# Patient Record
Sex: MALE | Race: WHITE | Employment: OTHER | URBAN - METROPOLITAN AREA
[De-identification: names, ages, dates, MRNs, and addresses within clinical notes are randomized per-mention and may not be internally consistent; named-entity substitution may affect disease eponyms.]

---

## 2017-06-10 PROBLEM — J69.0 ASPIRATION PNEUMONIA OF BOTH LUNGS DUE TO GASTRIC SECRETIONS (HCC): Status: ACTIVE | Noted: 2017-06-10

## 2018-05-05 ENCOUNTER — HOSPITAL ENCOUNTER (EMERGENCY)
Age: 53
Discharge: HOME | End: 2018-05-05
Attending: EMERGENCY MEDICINE
Payer: COMMERCIAL

## 2018-05-05 VITALS
HEART RATE: 84 BPM | SYSTOLIC BLOOD PRESSURE: 125 MMHG | TEMPERATURE: 97.5 F | DIASTOLIC BLOOD PRESSURE: 66 MMHG | RESPIRATION RATE: 27 BRPM | WEIGHT: 163.5 LBS | OXYGEN SATURATION: 94 % | BODY MASS INDEX: 24.14 KG/M2

## 2018-05-05 DIAGNOSIS — T40.1X1A ACCIDENTAL OVERDOSE OF HEROIN, INITIAL ENCOUNTER (HCC): Primary | ICD-10-CM

## 2018-05-05 PROCEDURE — 99284 EMERGENCY DEPT VISIT MOD MDM: CPT

## 2018-05-05 PROCEDURE — 96374 THER/PROPH/DIAG INJ IV PUSH: CPT

## 2018-05-05 PROCEDURE — 2580000003 HC RX 258: Performed by: EMERGENCY MEDICINE

## 2018-05-05 PROCEDURE — 6360000002 HC RX W HCPCS: Performed by: STUDENT IN AN ORGANIZED HEALTH CARE EDUCATION/TRAINING PROGRAM

## 2018-05-05 PROCEDURE — 82962 GLUCOSE BLOOD TEST: CPT

## 2018-05-05 PROCEDURE — 96375 TX/PRO/DX INJ NEW DRUG ADDON: CPT

## 2018-05-05 RX ORDER — ONDANSETRON 2 MG/ML
4 INJECTION INTRAMUSCULAR; INTRAVENOUS EVERY 6 HOURS PRN
Status: DISCONTINUED | OUTPATIENT
Start: 2018-05-05 | End: 2018-05-05 | Stop reason: HOSPADM

## 2018-05-05 RX ORDER — NALOXONE HYDROCHLORIDE 0.4 MG/ML
0.1 INJECTION, SOLUTION INTRAMUSCULAR; INTRAVENOUS; SUBCUTANEOUS ONCE
Status: DISCONTINUED | OUTPATIENT
Start: 2018-05-05 | End: 2018-05-05

## 2018-05-05 RX ORDER — NALOXONE HYDROCHLORIDE 0.4 MG/ML
0.2 INJECTION, SOLUTION INTRAMUSCULAR; INTRAVENOUS; SUBCUTANEOUS ONCE
Status: COMPLETED | OUTPATIENT
Start: 2018-05-05 | End: 2018-05-05

## 2018-05-05 RX ORDER — SODIUM CHLORIDE 9 MG/ML
1000 INJECTION, SOLUTION INTRAVENOUS ONCE
Status: COMPLETED | OUTPATIENT
Start: 2018-05-05 | End: 2018-05-05

## 2018-05-05 RX ORDER — NALOXONE HYDROCHLORIDE 1 MG/ML
1.8 INJECTION INTRAMUSCULAR; INTRAVENOUS; SUBCUTANEOUS ONCE
Status: COMPLETED | OUTPATIENT
Start: 2018-05-05 | End: 2018-05-05

## 2018-05-05 RX ADMIN — ONDANSETRON 4 MG: 2 INJECTION INTRAMUSCULAR; INTRAVENOUS at 16:22

## 2018-05-05 RX ADMIN — NALOXONE HYDROCHLORIDE 0.2 MG: 0.4 INJECTION, SOLUTION INTRAMUSCULAR; INTRAVENOUS; SUBCUTANEOUS at 16:22

## 2018-05-05 RX ADMIN — SODIUM CHLORIDE 1000 ML: 9 INJECTION, SOLUTION INTRAVENOUS at 16:40

## 2018-05-05 RX ADMIN — NALOXONE HYDROCHLORIDE 1.8 MG: 1 INJECTION PARENTERAL at 16:32

## 2018-05-05 ASSESSMENT — ENCOUNTER SYMPTOMS
COUGH: 0
SORE THROAT: 0
ABDOMINAL PAIN: 0
NAUSEA: 0
BACK PAIN: 0
COLOR CHANGE: 0
SHORTNESS OF BREATH: 0
VOMITING: 0

## 2018-05-07 LAB — METER GLUCOSE: 467 MG/DL (ref 70–110)

## 2018-07-30 ENCOUNTER — HOSPITAL ENCOUNTER (EMERGENCY)
Age: 53
Discharge: HOME | End: 2018-07-30
Attending: EMERGENCY MEDICINE
Payer: COMMERCIAL

## 2018-07-30 VITALS
HEART RATE: 63 BPM | TEMPERATURE: 97.8 F | SYSTOLIC BLOOD PRESSURE: 132 MMHG | OXYGEN SATURATION: 95 % | RESPIRATION RATE: 22 BRPM | DIASTOLIC BLOOD PRESSURE: 71 MMHG

## 2018-07-30 DIAGNOSIS — T40.1X1A ACCIDENTAL OVERDOSE OF HEROIN, INITIAL ENCOUNTER (HCC): Primary | ICD-10-CM

## 2018-07-30 PROCEDURE — 99283 EMERGENCY DEPT VISIT LOW MDM: CPT

## 2018-07-30 NOTE — ED PROVIDER NOTES
ED ATTENDING NOTE    I saw and examined the patient. I discussed the history and examination with the resident and agree with the plan of care         HPI: Pt presents s/p overdose, pt admit to using \"dope\" tonight, unsure of who called EMS, awoke after getting narcan, pt now alert and oriented, no complaints, pt denies f/c, ha, cp, sob, cough, ap, n/v/d. Pt is lying in bed in no acute distress. --------------------------------------------- PAST HISTORY ---------------------------------------------  Past Medical History:  has a past medical history of Arthritis; Aspiration pneumonia of both lungs due to gastric secretions ([facility] Bipolar 1 disorder ([facility] Hepatitis C; and Schizophrenia ([facility] Past Surgical History:  has a past surgical history that includes joint replacement; Endoscopy, colon, diagnostic; Colonoscopy; Rotator cuff repair; Orbital fracture repair; eye surgery; and fracture surgery. Social History:  reports that he has been smoking Cigarettes. He has a 10.00 pack-year smoking history. He has never used smokeless tobacco. He reports that he drinks alcohol. He reports that he uses drugs, including Other-see comments. Family History: family history includes High Blood Pressure in his father; Mental Illness in his mother. The patients home medications have been reviewed. Allergies: Patient has no known allergies.     ROS: Review HPI for other details  Details in electronic record may supersede details below    Gen: no chills, fever  Eyes: no discharge, no change vision  ENT: no sore throat, no rhinitis  Cardiac: no chest pain, no palpitations  Resp: no sob, no cough  GI: no abd pain, no nausea, vomiting, or diarrhea  : no dysuria, urgency, change in color  MS: no back pain, joint pain, no falls, no trauma   Skin: no rash, no itch  Neuro: no dizziness, headache, no focal paralysis or parasthesia  Psych: no hallucinations, no depression  Heme: no bruising, no bleeding  Other relevant

## 2018-07-31 ENCOUNTER — APPOINTMENT (OUTPATIENT)
Dept: CT IMAGING | Age: 53
End: 2018-07-31
Payer: COMMERCIAL

## 2018-07-31 ENCOUNTER — HOSPITAL ENCOUNTER (EMERGENCY)
Age: 53
Discharge: HOME | End: 2018-07-31
Attending: EMERGENCY MEDICINE
Payer: COMMERCIAL

## 2018-07-31 VITALS
WEIGHT: 163 LBS | RESPIRATION RATE: 16 BRPM | BODY MASS INDEX: 24.14 KG/M2 | HEIGHT: 69 IN | OXYGEN SATURATION: 94 % | HEART RATE: 75 BPM | DIASTOLIC BLOOD PRESSURE: 68 MMHG | TEMPERATURE: 97.5 F | SYSTOLIC BLOOD PRESSURE: 126 MMHG

## 2018-07-31 DIAGNOSIS — T40.1X1A ACCIDENTAL OVERDOSE OF HEROIN, INITIAL ENCOUNTER (HCC): Primary | ICD-10-CM

## 2018-07-31 PROCEDURE — 99284 EMERGENCY DEPT VISIT MOD MDM: CPT

## 2018-07-31 PROCEDURE — 6360000002 HC RX W HCPCS: Performed by: EMERGENCY MEDICINE

## 2018-07-31 PROCEDURE — 70450 CT HEAD/BRAIN W/O DYE: CPT

## 2018-07-31 PROCEDURE — 72125 CT NECK SPINE W/O DYE: CPT

## 2018-07-31 RX ORDER — NALOXONE HYDROCHLORIDE 1 MG/ML
1 INJECTION INTRAMUSCULAR; INTRAVENOUS; SUBCUTANEOUS ONCE
Status: COMPLETED | OUTPATIENT
Start: 2018-07-31 | End: 2018-07-31

## 2018-07-31 RX ORDER — NALOXONE HYDROCHLORIDE 1 MG/ML
1 INJECTION INTRAMUSCULAR; INTRAVENOUS; SUBCUTANEOUS PRN
Status: DISCONTINUED | OUTPATIENT
Start: 2018-07-31 | End: 2018-07-31

## 2018-07-31 RX ORDER — NALOXONE HYDROCHLORIDE 1 MG/ML
2 INJECTION INTRAMUSCULAR; INTRAVENOUS; SUBCUTANEOUS ONCE
Status: DISCONTINUED | OUTPATIENT
Start: 2018-07-31 | End: 2018-07-31

## 2018-07-31 RX ORDER — NALOXONE HYDROCHLORIDE 1 MG/ML
2 INJECTION INTRAMUSCULAR; INTRAVENOUS; SUBCUTANEOUS ONCE
Status: COMPLETED | OUTPATIENT
Start: 2018-07-31 | End: 2018-07-31

## 2018-07-31 RX ADMIN — NALOXONE HYDROCHLORIDE 2 MG: 1 INJECTION PARENTERAL at 18:18

## 2018-07-31 RX ADMIN — NALOXONE HYDROCHLORIDE 1 MG: 1 INJECTION PARENTERAL at 17:13

## 2018-07-31 NOTE — ED NOTES
Pt up for discharge and now is more lethargic and out of it. Pt had been in the bathroom for a while before with several people checking on the patient, opening the door to check on him and getting verbal confirmation that pt was ok. Pt now admits to using heroine in the rest room. Dr. Nancy Colbert and Dr. Nevin Hanna at  and orders for Narcan given intra nasal. [employer] [facility] at this time searching pt.       Pearl Son RN  07/31/18 1991

## 2018-07-31 NOTE — CARE COORDINATION
Peer Recovery Supporter attempted initial meet with Patient per referral from [facility]. Patient presented to the ED for Heroine Overdose. Patient was too lethargic and unresponsive for meeting. Patient was seen by [facility] in May and declined services. Patient has overdosed 6 times in the past four years, according to his encounters on [facility]. PRS left PRS Services Pamphlet, meeting schedules, and contact information. PRS will try to follow up with this Patient before he is discharged.

## 2019-05-16 ENCOUNTER — APPOINTMENT (OUTPATIENT)
Dept: GENERAL RADIOLOGY | Age: 54
End: 2019-05-16
Payer: COMMERCIAL

## 2019-05-16 ENCOUNTER — HOSPITAL ENCOUNTER (EMERGENCY)
Age: 54
Discharge: HOME | End: 2019-05-16
Attending: EMERGENCY MEDICINE
Payer: COMMERCIAL

## 2019-05-16 VITALS
DIASTOLIC BLOOD PRESSURE: 69 MMHG | SYSTOLIC BLOOD PRESSURE: 108 MMHG | TEMPERATURE: 97.4 F | WEIGHT: 185 LBS | HEIGHT: 66 IN | RESPIRATION RATE: 17 BRPM | BODY MASS INDEX: 29.73 KG/M2 | OXYGEN SATURATION: 94 % | HEART RATE: 88 BPM

## 2019-05-16 DIAGNOSIS — F19.10 POLYSUBSTANCE ABUSE (HCC): ICD-10-CM

## 2019-05-16 DIAGNOSIS — T40.601A OPIATE OVERDOSE, ACCIDENTAL OR UNINTENTIONAL, INITIAL ENCOUNTER (HCC): Primary | ICD-10-CM

## 2019-05-16 LAB
ACETAMINOPHEN LEVEL: <5 MCG/ML (ref 10–30)
AMPHETAMINE SCREEN, URINE: POSITIVE
ANION GAP SERPL CALCULATED.3IONS-SCNC: 16 MMOL/L (ref 7–16)
BARBITURATE SCREEN URINE: NOT DETECTED
BENZODIAZEPINE SCREEN, URINE: POSITIVE
BUN BLDV-MCNC: 22 MG/DL (ref 6–20)
CALCIUM SERPL-MCNC: 9.5 MG/DL (ref 8.6–10.2)
CANNABINOID SCREEN URINE: POSITIVE
CHLORIDE BLD-SCNC: 96 MMOL/L (ref 98–107)
CO2: 26 MMOL/L (ref 22–29)
COCAINE METABOLITE SCREEN URINE: POSITIVE
CREAT SERPL-MCNC: 1.7 MG/DL (ref 0.7–1.2)
EKG ATRIAL RATE: 97 BPM
EKG P AXIS: 59 DEGREES
EKG P-R INTERVAL: 184 MS
EKG Q-T INTERVAL: 360 MS
EKG QRS DURATION: 92 MS
EKG QTC CALCULATION (BAZETT): 457 MS
EKG R AXIS: 7 DEGREES
EKG T AXIS: 47 DEGREES
EKG VENTRICULAR RATE: 97 BPM
ETHANOL: <10 MG/DL (ref 0–0.08)
GFR AFRICAN AMERICAN: 51
GFR NON-AFRICAN AMERICAN: 42 ML/MIN/1.73
GLUCOSE BLD-MCNC: 179 MG/DL (ref 74–99)
HCT VFR BLD CALC: 44.4 % (ref 37–54)
HEMOGLOBIN: 15.2 G/DL (ref 12.5–16.5)
MCH RBC QN AUTO: 31.5 PG (ref 26–35)
MCHC RBC AUTO-ENTMCNC: 34.2 % (ref 32–34.5)
MCV RBC AUTO: 92.1 FL (ref 80–99.9)
METHADONE SCREEN, URINE: POSITIVE
OPIATE SCREEN URINE: POSITIVE
PDW BLD-RTO: 13.5 FL (ref 11.5–15)
PHENCYCLIDINE SCREEN URINE: NOT DETECTED
PLATELET # BLD: 223 E9/L (ref 130–450)
PMV BLD AUTO: 9.4 FL (ref 7–12)
POTASSIUM SERPL-SCNC: 3.9 MMOL/L (ref 3.5–5)
PROPOXYPHENE SCREEN: NOT DETECTED
RBC # BLD: 4.82 E12/L (ref 3.8–5.8)
SALICYLATE, SERUM: <0.3 MG/DL (ref 0–30)
SODIUM BLD-SCNC: 138 MMOL/L (ref 132–146)
TRICYCLIC ANTIDEPRESSANTS SCREEN SERUM: NEGATIVE NG/ML
TROPONIN: <0.01 NG/ML (ref 0–0.03)
WBC # BLD: 7.6 E9/L (ref 4.5–11.5)

## 2019-05-16 PROCEDURE — 84484 ASSAY OF TROPONIN QUANT: CPT

## 2019-05-16 PROCEDURE — G0480 DRUG TEST DEF 1-7 CLASSES: HCPCS

## 2019-05-16 PROCEDURE — 93010 ELECTROCARDIOGRAM REPORT: CPT | Performed by: INTERNAL MEDICINE

## 2019-05-16 PROCEDURE — 36415 COLL VENOUS BLD VENIPUNCTURE: CPT

## 2019-05-16 PROCEDURE — 85027 COMPLETE CBC AUTOMATED: CPT

## 2019-05-16 PROCEDURE — 80048 BASIC METABOLIC PNL TOTAL CA: CPT

## 2019-05-16 PROCEDURE — 80307 DRUG TEST PRSMV CHEM ANLYZR: CPT

## 2019-05-16 PROCEDURE — 99284 EMERGENCY DEPT VISIT MOD MDM: CPT

## 2019-05-16 PROCEDURE — 2580000003 HC RX 258: Performed by: EMERGENCY MEDICINE

## 2019-05-16 PROCEDURE — 71045 X-RAY EXAM CHEST 1 VIEW: CPT

## 2019-05-16 PROCEDURE — 93005 ELECTROCARDIOGRAM TRACING: CPT | Performed by: EMERGENCY MEDICINE

## 2019-05-16 RX ORDER — 0.9 % SODIUM CHLORIDE 0.9 %
1000 INTRAVENOUS SOLUTION INTRAVENOUS ONCE
Status: COMPLETED | OUTPATIENT
Start: 2019-05-16 | End: 2019-05-16

## 2019-05-16 RX ADMIN — SODIUM CHLORIDE 1000 ML: 9 INJECTION, SOLUTION INTRAVENOUS at 04:12

## 2019-05-16 ASSESSMENT — ENCOUNTER SYMPTOMS
WHEEZING: 0
EYE REDNESS: 0
ABDOMINAL PAIN: 0
SORE THROAT: 0
EYE PAIN: 0
SINUS PRESSURE: 0
NAUSEA: 0
BACK PAIN: 0
COUGH: 0
VOMITING: 0
DIARRHEA: 0
EYE DISCHARGE: 0
SHORTNESS OF BREATH: 0

## 2019-05-16 NOTE — ED PROVIDER NOTES
Patient is a 49 y/o male who presents to the ED via EMS. Patient reportedly snorted heroin tonight and became unresponsive. Patient states he does not remember what happened. He denies any chest pain, palpitations or shortness of breath. Review of Systems   Constitutional: Negative for chills and fever. HENT: Negative for ear pain, sinus pressure and sore throat. Eyes: Negative for pain, discharge and redness. Respiratory: Negative for cough, shortness of breath and wheezing. Cardiovascular: Negative for chest pain. Gastrointestinal: Negative for abdominal pain, diarrhea, nausea and vomiting. Genitourinary: Negative for dysuria and frequency. Musculoskeletal: Negative for arthralgias and back pain. Skin: Negative for rash and wound. Neurological: Negative for weakness and headaches. Hematological: Negative for adenopathy. All other systems reviewed and are negative. Physical Exam   Constitutional: He is oriented to person, place, and time. He appears well-developed and well-nourished. No distress. HENT:   Head: Normocephalic and atraumatic. Right Ear: External ear normal.   Left Ear: External ear normal.   Nose: Nose normal.   Mouth/Throat: Oropharynx is clear and moist.   Eyes: Pupils are equal, round, and reactive to light. Conjunctivae are normal.   Neck: Normal range of motion. Neck supple. Cardiovascular: Normal rate, regular rhythm and normal heart sounds. No murmur heard. Pulmonary/Chest: Effort normal and breath sounds normal. No stridor. No respiratory distress. He has no wheezes. He has no rales. Abdominal: Soft. Bowel sounds are normal. He exhibits no distension. There is no tenderness. There is no guarding. Musculoskeletal: Normal range of motion. He exhibits no edema. Neurological: He is alert and oriented to person, place, and time. Skin: Skin is warm and dry. He is not diaphoretic. Nursing note and vitals reviewed.       Procedures    MDM EKG:  Normal sinus rhythm with ventricular rate of 97. NH interval, QRS duration and QT interval within normal range. Normal axis. Nonspecific ST segment and T wave changes. No previous EKG. Time: 0518  Re-evaluation. Patients symptoms show no change  Repeat physical examination is not changed  Patient remains alert. No complaints.         --------------------------------------------- PAST HISTORY ---------------------------------------------  Past Medical History:  has a past medical history of Arthritis, Aspiration pneumonia of both lungs due to gastric secretions ([facility] Bipolar 1 disorder ([facility] Hepatitis C, and Schizophrenia ([facility] Past Surgical History:  has a past surgical history that includes joint replacement; Endoscopy, colon, diagnostic; Colonoscopy; Rotator cuff repair; Orbital fracture repair; eye surgery; and fracture surgery. Social History:  reports that he has been smoking cigarettes. He has a 10.00 pack-year smoking history. He has never used smokeless tobacco. He reports that he drinks alcohol. He reports that he has current or past drug history. Drug: Other-see comments. Family History: family history includes High Blood Pressure in his father; Mental Illness in his mother. The patients home medications have been reviewed. Allergies: Patient has no known allergies.     -------------------------------------------------- RESULTS -------------------------------------------------  Labs:  Results for orders placed or performed during the hospital encounter of 30/11/09   Basic metabolic panel   Result Value Ref Range    Sodium 138 132 - 146 mmol/L    Potassium 3.9 3.5 - 5.0 mmol/L    Chloride 96 (L) 98 - 107 mmol/L    CO2 26 22 - 29 mmol/L    Anion Gap 16 7 - 16 mmol/L    Glucose 179 (H) 74 - 99 mg/dL    BUN 22 (H) 6 - 20 mg/dL    CREATININE 1.7 (H) 0.7 - 1.2 mg/dL    GFR Non-African American 42 >=60 mL/min/1.73    GFR African American 51     Calcium 9.5 8.6 - 10.2 mg/dL CBC   Result Value Ref Range    WBC 7.6 4.5 - 11.5 E9/L    RBC 4.82 3.80 - 5.80 E12/L    Hemoglobin 15.2 12.5 - 16.5 g/dL    Hematocrit 44.4 37.0 - 54.0 %    MCV 92.1 80.0 - 99.9 fL    MCH 31.5 26.0 - 35.0 pg    MCHC 34.2 32.0 - 34.5 %    RDW 13.5 11.5 - 15.0 fL    Platelets 338 262 - 330 E9/L    MPV 9.4 7.0 - 12.0 fL   Troponin   Result Value Ref Range    Troponin <0.01 0.00 - 0.03 ng/mL   Serum Drug Screen   Result Value Ref Range    Ethanol Lvl <10 mg/dL    Acetaminophen Level <5.0 (L) 10.0 - 66.5 mcg/mL    Salicylate, Serum <4.1 0.0 - 30.0 mg/dL   Urine Drug Screen   Result Value Ref Range    Amphetamine Screen, Urine POSITIVE (A) Negative <1000 ng/mL    Barbiturate Screen, Ur NOT DETECTED Negative < 200 ng/mL    Benzodiazepine Screen, Urine POSITIVE (A) Negative < 200 ng/mL    Cannabinoid Scrn, Ur POSITIVE (A) Negative < 50ng/mL    Cocaine Metabolite Screen, Urine POSITIVE (A) Negative < 300 ng/mL    Opiate Scrn, Ur POSITIVE (A) Negative < 300ng/mL    PCP Screen, Urine NOT DETECTED Negative < 25 ng/mL    Methadone Screen, Urine POSITIVE (A) Negative <300 ng/mL    Propoxyphene Scrn, Ur NOT DETECTED Negative <300 ng/mL   EKG 12 Lead   Result Value Ref Range    Ventricular Rate 97 BPM    Atrial Rate 97 BPM    P-R Interval 184 ms    QRS Duration 92 ms    Q-T Interval 360 ms    QTc Calculation (Bazett) 457 ms    P Axis 59 degrees    R Axis 7 degrees    T Axis 47 degrees       Radiology:  XR CHEST PORTABLE    (Results Pending)       ------------------------- NURSING NOTES AND VITALS REVIEWED ---------------------------  Date / Time Roomed:  5/16/2019  2:16 AM  ED Bed Assignment:  02/02    The nursing notes within the ED encounter and vital signs as below have been reviewed.    /74   Pulse 103   Temp 97.4 °F (36.3 °C) (Oral)   Resp 18   Ht 5' 6\" (1.676 m)   Wt 185 lb (83.9 kg)   SpO2 97%   BMI 29.86 kg/m²   Oxygen Saturation Interpretation: Normal      ------------------------------------------ PROGRESS NOTES ------------------------------------------  I have spoken with the patient and discussed todays results, in addition to providing specific details for the plan of care and counseling regarding the diagnosis and prognosis. Their questions are answered at this time and they are agreeable with the plan. I discussed at length with them reasons for immediate return here for re evaluation. They will followup with primary care by calling their office tomorrow. --------------------------------- ADDITIONAL PROVIDER NOTES ---------------------------------  At this time the patient is without objective evidence of an acute process requiring hospitalization or inpatient management. They have remained hemodynamically stable throughout their entire ED visit and are stable for discharge with outpatient follow-up. The plan has been discussed in detail and they are aware of the specific conditions for emergent return, as well as the importance of follow-up. New Prescriptions    No medications on file       Diagnosis:  1. Opiate overdose, accidental or unintentional, initial encounter ([facility]    2. Polysubstance abuse ([facility]        Disposition:  Patient's disposition: Discharge to home  Patient's condition is stable.                Silvina Alcaraz DO  05/16/19 [id number]

## 2019-05-19 LAB
7-AMINOCLONAZEPAM, URINE: 45 NG/ML
ALPHA-HYDROXYALPRAZOLAM, URINE: 455 NG/ML
ALPHA-HYDROXYMIDAZOLAM, URINE: <20 NG/ML
ALPRAZOLAM, URINE: 162 NG/ML
CHLORDIAZEPOXIDE, URINE: <20 NG/ML
CLONAZEPAM, URINE: <5 NG/ML
DIAZEPAM, URINE: <20 NG/ML
LORAZEPAM, URINE: <20 NG/ML
MIDAZOLAM, URINE: <20 NG/ML
NORDIAZEPAM, URINE: 88 NG/ML
OXAZEPAM, URINE: 447 NG/ML
TEMAZEPAM, URINE: 133 NG/ML

## 2019-05-20 LAB
AMPHETAMINES, URINE: 893 NG/ML
COCAINE, CONFIRM, URINE: >1000 NG/ML
EDDP, URINE: 1375 NG/ML
METHADONE, URINE: 786 NG/ML
METHAMPHETAMINE, URINE: 5774 NG/ML
METHYLENEDIOXYAMPHETAMINE, UR: <200 NG/ML
METHYLENEDIOXYETHYLAMPHETAMINE, UR: <200 NG/ML
METHYLENEDIOXYMETHAMPHETAMINE, UR: <200 NG/ML

## 2019-05-21 LAB — CANNABINOIDS CONF, URINE: 64 NG/ML

## 2019-05-22 LAB
6AM URINE: 97 NG/ML
CODEINE, URINE: 29 NG/ML
HYDROCODONE, URINE: <20 NG/ML
HYDROMORPHONE, URINE: <20 NG/ML
MORPHINE URINE: 535 NG/ML
NORHYDROCODONE, URINE: <20 NG/ML
NOROXYCODONE, URINE: <20 NG/ML
NOROXYMORPHONE, URINE: <20 NG/ML
OXYCODONE, URINE CONFIRMATION: <20 NG/ML
OXYMORPHONE, URINE: <20 NG/ML

## 2019-06-13 ENCOUNTER — APPOINTMENT (OUTPATIENT)
Dept: GENERAL RADIOLOGY | Age: 54
DRG: 812 | End: 2019-06-13
Payer: COMMERCIAL

## 2019-06-13 ENCOUNTER — APPOINTMENT (OUTPATIENT)
Dept: CT IMAGING | Age: 54
DRG: 812 | End: 2019-06-13
Payer: COMMERCIAL

## 2019-06-13 ENCOUNTER — HOSPITAL ENCOUNTER (INPATIENT)
Age: 54
LOS: 5 days | Discharge: HOME | DRG: 812 | End: 2019-06-18
Attending: EMERGENCY MEDICINE | Admitting: INTERNAL MEDICINE
Payer: COMMERCIAL

## 2019-06-13 DIAGNOSIS — F19.10 POLYSUBSTANCE ABUSE (HCC): ICD-10-CM

## 2019-06-13 DIAGNOSIS — J96.00 ACUTE RESPIRATORY FAILURE, UNSPECIFIED WHETHER WITH HYPOXIA OR HYPERCAPNIA (HCC): Primary | ICD-10-CM

## 2019-06-13 DIAGNOSIS — T14.90XA TRAUMA: ICD-10-CM

## 2019-06-13 DIAGNOSIS — T79.6XXA TRAUMATIC RHABDOMYOLYSIS, INITIAL ENCOUNTER (HCC): ICD-10-CM

## 2019-06-13 DIAGNOSIS — E87.3 RESPIRATORY ALKALOSIS: ICD-10-CM

## 2019-06-13 DIAGNOSIS — G93.40 ACUTE ENCEPHALOPATHY: ICD-10-CM

## 2019-06-13 PROBLEM — T50.901A DRUG OVERDOSE: Status: ACTIVE | Noted: 2019-06-13

## 2019-06-13 PROBLEM — T50.901A DRUG OVERDOSE, ACCIDENTAL OR UNINTENTIONAL, INITIAL ENCOUNTER: Status: ACTIVE | Noted: 2019-06-13

## 2019-06-13 PROBLEM — J96.90 RESPIRATORY FAILURE (HCC): Status: ACTIVE | Noted: 2019-06-13

## 2019-06-13 LAB
ACETAMINOPHEN LEVEL: <5 MCG/ML (ref 10–30)
ALBUMIN SERPL-MCNC: 4.2 G/DL (ref 3.5–5.2)
ALP BLD-CCNC: 100 U/L (ref 40–129)
ALT SERPL-CCNC: 60 U/L (ref 0–40)
AMMONIA: 78 UMOL/L (ref 16–60)
AMPHETAMINE SCREEN, URINE: NOT DETECTED
ANION GAP SERPL CALCULATED.3IONS-SCNC: 12 MMOL/L (ref 7–16)
APTT: 32.9 SEC (ref 24.5–35.1)
AST SERPL-CCNC: 61 U/L (ref 0–39)
B.E.: 2.1 MMOL/L (ref -3–3)
B.E.: 3 MMOL/L (ref -3–3)
BACTERIA: NORMAL /HPF
BARBITURATE SCREEN URINE: NOT DETECTED
BASOPHILS ABSOLUTE: 0.02 E9/L (ref 0–0.2)
BASOPHILS RELATIVE PERCENT: 0.1 % (ref 0–2)
BENZODIAZEPINE SCREEN, URINE: NOT DETECTED
BILIRUB SERPL-MCNC: 0.6 MG/DL (ref 0–1.2)
BILIRUBIN DIRECT: <0.2 MG/DL (ref 0–0.3)
BILIRUBIN URINE: NEGATIVE
BILIRUBIN, INDIRECT: NORMAL MG/DL (ref 0–1)
BLOOD, URINE: ABNORMAL
BUN BLDV-MCNC: 27 MG/DL (ref 6–20)
CALCIUM SERPL-MCNC: 9.5 MG/DL (ref 8.6–10.2)
CANNABINOID SCREEN URINE: NOT DETECTED
CHLORIDE BLD-SCNC: 101 MMOL/L (ref 98–107)
CLARITY: CLEAR
CO2: 26 MMOL/L (ref 22–29)
CO2: 26 MMOL/L (ref 22–29)
COCAINE METABOLITE SCREEN URINE: NOT DETECTED
COHB: 0.1 % (ref 0–1.5)
COHB: 1.1 % (ref 0–1.5)
COLOR: YELLOW
CREAT SERPL-MCNC: 0.8 MG/DL (ref 0.7–1.2)
CRITICAL: ABNORMAL
CRITICAL: ABNORMAL
DATE ANALYZED: ABNORMAL
DATE ANALYZED: ABNORMAL
DATE OF COLLECTION: ABNORMAL
DATE OF COLLECTION: ABNORMAL
EOSINOPHILS ABSOLUTE: 0 E9/L (ref 0.05–0.5)
EOSINOPHILS RELATIVE PERCENT: 0 % (ref 0–6)
EPITHELIAL CELLS, UA: NORMAL /HPF
ETHANOL: <10 MG/DL (ref 0–0.08)
GFR AFRICAN AMERICAN: >60
GFR AFRICAN AMERICAN: >60
GFR NON-AFRICAN AMERICAN: >60 ML/MIN/1.73
GFR NON-AFRICAN AMERICAN: >60 ML/MIN/1.73
GLUCOSE BLD-MCNC: 96 MG/DL (ref 74–99)
GLUCOSE BLD-MCNC: 96 MG/DL (ref 74–99)
GLUCOSE URINE: NEGATIVE MG/DL
HCO3: 22.7 MMOL/L (ref 22–26)
HCO3: 27.5 MMOL/L (ref 22–26)
HCT VFR BLD CALC: 43.1 % (ref 37–54)
HEMOGLOBIN: 14.3 G/DL (ref 12.5–16.5)
HHB: 1 % (ref 0–5)
HHB: 4.4 % (ref 0–5)
IMMATURE GRANULOCYTES #: 0.06 E9/L
IMMATURE GRANULOCYTES %: 0.4 % (ref 0–5)
INR BLD: 1
KETONES, URINE: 15 MG/DL
LAB: ABNORMAL
LAB: ABNORMAL
LACTIC ACID: 2.8 MMOL/L (ref 0.5–2.2)
LEUKOCYTE ESTERASE, URINE: NEGATIVE
LIPASE: 13 U/L (ref 13–60)
LITHIUM DOSE AMOUNT: ABNORMAL
LITHIUM LEVEL: <0.1 MMOL/L (ref 0.5–1.5)
LYMPHOCYTES ABSOLUTE: 2.06 E9/L (ref 1.5–4)
LYMPHOCYTES RELATIVE PERCENT: 15 % (ref 20–42)
Lab: ABNORMAL
Lab: ABNORMAL
MCH RBC QN AUTO: 31.8 PG (ref 26–35)
MCHC RBC AUTO-ENTMCNC: 33.2 % (ref 32–34.5)
MCV RBC AUTO: 95.8 FL (ref 80–99.9)
METER GLUCOSE: 90 MG/DL (ref 74–99)
METHADONE SCREEN, URINE: POSITIVE
METHB: 0.3 % (ref 0–1.5)
METHB: 0.5 % (ref 0–1.5)
MODE: ABNORMAL
MODE: AC
MONOCYTES ABSOLUTE: 1.36 E9/L (ref 0.1–0.95)
MONOCYTES RELATIVE PERCENT: 9.9 % (ref 2–12)
NEUTROPHILS ABSOLUTE: 10.21 E9/L (ref 1.8–7.3)
NEUTROPHILS RELATIVE PERCENT: 74.6 % (ref 43–80)
NITRITE, URINE: NEGATIVE
O2 CONTENT: 19.1 ML/DL
O2 CONTENT: 20.1 ML/DL
O2 SATURATION: 95.5 % (ref 92–98.5)
O2 SATURATION: 99 % (ref 92–98.5)
O2HB: 94.2 % (ref 94–97)
O2HB: 98.4 % (ref 94–97)
OPERATOR ID: ABNORMAL
OPERATOR ID: ABNORMAL
OPIATE SCREEN URINE: NOT DETECTED
OSMOLALITY: 294 MOSM/KG (ref 285–310)
PATIENT TEMP: 37 C
PATIENT TEMP: 37 C
PCO2: 23.8 MMHG (ref 35–45)
PCO2: 46.2 MMHG (ref 35–45)
PDW BLD-RTO: 14.1 FL (ref 11.5–15)
PEEP/CPAP: 5 CMH2O
PH BLOOD GAS: 7.39 (ref 7.35–7.45)
PH BLOOD GAS: 7.6 (ref 7.35–7.45)
PH UA: 7 (ref 5–9)
PHENCYCLIDINE SCREEN URINE: NOT DETECTED
PLATELET # BLD: 275 E9/L (ref 130–450)
PMV BLD AUTO: 10.1 FL (ref 7–12)
PO2: 230 MMHG (ref 60–100)
PO2: 60.9 MMHG (ref 60–100)
POC ANION GAP: 14 MMOL/L (ref 7–16)
POC BUN: 29 MG/DL (ref 8–23)
POC CHLORIDE: 98 MMOL/L (ref 100–108)
POC CREATININE: 0.8 MG/DL (ref 0.7–1.2)
POC POTASSIUM: 4.7 MMOL/L (ref 3.5–5)
POC SODIUM: 138 MMOL/L (ref 132–146)
POTASSIUM REFLEX MAGNESIUM: 5.1 MMOL/L (ref 3.5–5)
PROPOXYPHENE SCREEN: NOT DETECTED
PROTEIN UA: NEGATIVE MG/DL
PROTHROMBIN TIME: 11.1 SEC (ref 9.3–12.4)
RBC # BLD: 4.5 E12/L (ref 3.8–5.8)
RBC UA: NORMAL /HPF (ref 0–2)
RR MECHANICAL: 15 B/MIN
SALICYLATE, SERUM: <0.3 MG/DL (ref 0–30)
SODIUM BLD-SCNC: 139 MMOL/L (ref 132–146)
SOURCE, BLOOD GAS: ABNORMAL
SOURCE, BLOOD GAS: ABNORMAL
SPECIFIC GRAVITY UA: <=1.005 (ref 1–1.03)
T4 TOTAL: 4.8 MCG/DL (ref 4.5–11.7)
THB: 13.4 G/DL (ref 11.5–16.5)
THB: 15.2 G/DL (ref 11.5–16.5)
TIME ANALYZED: 1635
TIME ANALYZED: 2203
TOTAL CK: 1944 U/L (ref 20–200)
TOTAL PROTEIN: 8 G/DL (ref 6.4–8.3)
TRICYCLIC ANTIDEPRESSANTS SCREEN SERUM: NEGATIVE NG/ML
TROPONIN: <0.01 NG/ML (ref 0–0.03)
TSH SERPL DL<=0.05 MIU/L-ACNC: 2.3 UIU/ML (ref 0.27–4.2)
UROBILINOGEN, URINE: 0.2 E.U./DL
VT MECHANICAL: 400 ML
WBC # BLD: 13.7 E9/L (ref 4.5–11.5)
WBC UA: NORMAL /HPF (ref 0–5)

## 2019-06-13 PROCEDURE — 2580000003 HC RX 258: Performed by: STUDENT IN AN ORGANIZED HEALTH CARE EDUCATION/TRAINING PROGRAM

## 2019-06-13 PROCEDURE — 80178 ASSAY OF LITHIUM: CPT

## 2019-06-13 PROCEDURE — 2580000003 HC RX 258: Performed by: EMERGENCY MEDICINE

## 2019-06-13 PROCEDURE — 80307 DRUG TEST PRSMV CHEM ANLYZR: CPT

## 2019-06-13 PROCEDURE — 6360000002 HC RX W HCPCS: Performed by: STUDENT IN AN ORGANIZED HEALTH CARE EDUCATION/TRAINING PROGRAM

## 2019-06-13 PROCEDURE — G0480 DRUG TEST DEF 1-7 CLASSES: HCPCS

## 2019-06-13 PROCEDURE — 86703 HIV-1/HIV-2 1 RESULT ANTBDY: CPT

## 2019-06-13 PROCEDURE — 6360000002 HC RX W HCPCS: Performed by: INTERNAL MEDICINE

## 2019-06-13 PROCEDURE — 36415 COLL VENOUS BLD VENIPUNCTURE: CPT

## 2019-06-13 PROCEDURE — 85025 COMPLETE CBC W/AUTO DIFF WBC: CPT

## 2019-06-13 PROCEDURE — 83690 ASSAY OF LIPASE: CPT

## 2019-06-13 PROCEDURE — 94002 VENT MGMT INPAT INIT DAY: CPT

## 2019-06-13 PROCEDURE — 81001 URINALYSIS AUTO W/SCOPE: CPT

## 2019-06-13 PROCEDURE — 2000000000 HC ICU R&B

## 2019-06-13 PROCEDURE — 70450 CT HEAD/BRAIN W/O DYE: CPT

## 2019-06-13 PROCEDURE — 2500000003 HC RX 250 WO HCPCS: Performed by: EMERGENCY MEDICINE

## 2019-06-13 PROCEDURE — 82550 ASSAY OF CK (CPK): CPT

## 2019-06-13 PROCEDURE — 51702 INSERT TEMP BLADDER CATH: CPT

## 2019-06-13 PROCEDURE — 82962 GLUCOSE BLOOD TEST: CPT

## 2019-06-13 PROCEDURE — 84484 ASSAY OF TROPONIN QUANT: CPT

## 2019-06-13 PROCEDURE — 82565 ASSAY OF CREATININE: CPT

## 2019-06-13 PROCEDURE — 71045 X-RAY EXAM CHEST 1 VIEW: CPT

## 2019-06-13 PROCEDURE — 0BH18EZ INSERTION OF ENDOTRACHEAL AIRWAY INTO TRACHEA, VIA NATURAL OR ARTIFICIAL OPENING ENDOSCOPIC: ICD-10-PCS | Performed by: STUDENT IN AN ORGANIZED HEALTH CARE EDUCATION/TRAINING PROGRAM

## 2019-06-13 PROCEDURE — 94760 N-INVAS EAR/PLS OXIMETRY 1: CPT

## 2019-06-13 PROCEDURE — 85730 THROMBOPLASTIN TIME PARTIAL: CPT

## 2019-06-13 PROCEDURE — 2500000003 HC RX 250 WO HCPCS: Performed by: STUDENT IN AN ORGANIZED HEALTH CARE EDUCATION/TRAINING PROGRAM

## 2019-06-13 PROCEDURE — 6360000002 HC RX W HCPCS

## 2019-06-13 PROCEDURE — 84145 PROCALCITONIN (PCT): CPT

## 2019-06-13 PROCEDURE — 6370000000 HC RX 637 (ALT 250 FOR IP): Performed by: INTERNAL MEDICINE

## 2019-06-13 PROCEDURE — 99291 CRITICAL CARE FIRST HOUR: CPT

## 2019-06-13 PROCEDURE — 2580000003 HC RX 258

## 2019-06-13 PROCEDURE — 80051 ELECTROLYTE PANEL: CPT

## 2019-06-13 PROCEDURE — 83930 ASSAY OF BLOOD OSMOLALITY: CPT

## 2019-06-13 PROCEDURE — 84436 ASSAY OF TOTAL THYROXINE: CPT

## 2019-06-13 PROCEDURE — 72125 CT NECK SPINE W/O DYE: CPT

## 2019-06-13 PROCEDURE — 93005 ELECTROCARDIOGRAM TRACING: CPT | Performed by: EMERGENCY MEDICINE

## 2019-06-13 PROCEDURE — 96374 THER/PROPH/DIAG INJ IV PUSH: CPT

## 2019-06-13 PROCEDURE — 87081 CULTURE SCREEN ONLY: CPT

## 2019-06-13 PROCEDURE — 80076 HEPATIC FUNCTION PANEL: CPT

## 2019-06-13 PROCEDURE — 82140 ASSAY OF AMMONIA: CPT

## 2019-06-13 PROCEDURE — 5A1935Z RESPIRATORY VENTILATION, LESS THAN 24 CONSECUTIVE HOURS: ICD-10-PCS | Performed by: STUDENT IN AN ORGANIZED HEALTH CARE EDUCATION/TRAINING PROGRAM

## 2019-06-13 PROCEDURE — 2580000003 HC RX 258: Performed by: INTERNAL MEDICINE

## 2019-06-13 PROCEDURE — 80074 ACUTE HEPATITIS PANEL: CPT

## 2019-06-13 PROCEDURE — 85610 PROTHROMBIN TIME: CPT

## 2019-06-13 PROCEDURE — 87088 URINE BACTERIA CULTURE: CPT

## 2019-06-13 PROCEDURE — 80053 COMPREHEN METABOLIC PANEL: CPT

## 2019-06-13 PROCEDURE — 82805 BLOOD GASES W/O2 SATURATION: CPT

## 2019-06-13 PROCEDURE — 96375 TX/PRO/DX INJ NEW DRUG ADDON: CPT

## 2019-06-13 PROCEDURE — 84443 ASSAY THYROID STIM HORMONE: CPT

## 2019-06-13 PROCEDURE — 82947 ASSAY GLUCOSE BLOOD QUANT: CPT

## 2019-06-13 PROCEDURE — 83605 ASSAY OF LACTIC ACID: CPT

## 2019-06-13 PROCEDURE — 84520 ASSAY OF UREA NITROGEN: CPT

## 2019-06-13 RX ORDER — DEXAMETHASONE SODIUM PHOSPHATE 10 MG/ML
10 INJECTION INTRAMUSCULAR; INTRAVENOUS ONCE
Status: DISCONTINUED | OUTPATIENT
Start: 2019-06-13 | End: 2019-06-13

## 2019-06-13 RX ORDER — SODIUM CHLORIDE 0.9 % (FLUSH) 0.9 %
10 SYRINGE (ML) INJECTION EVERY 12 HOURS SCHEDULED
Status: DISCONTINUED | OUTPATIENT
Start: 2019-06-13 | End: 2019-06-18 | Stop reason: HOSPADM

## 2019-06-13 RX ORDER — SODIUM CHLORIDE, SODIUM LACTATE, POTASSIUM CHLORIDE, CALCIUM CHLORIDE 600; 310; 30; 20 MG/100ML; MG/100ML; MG/100ML; MG/100ML
INJECTION, SOLUTION INTRAVENOUS CONTINUOUS
Status: DISCONTINUED | OUTPATIENT
Start: 2019-06-13 | End: 2019-06-16

## 2019-06-13 RX ORDER — SODIUM CHLORIDE 0.9 % (FLUSH) 0.9 %
SYRINGE (ML) INJECTION
Status: COMPLETED
Start: 2019-06-13 | End: 2019-06-13

## 2019-06-13 RX ORDER — DEXMEDETOMIDINE HYDROCHLORIDE 4 UG/ML
0.2 INJECTION, SOLUTION INTRAVENOUS CONTINUOUS
Status: DISCONTINUED | OUTPATIENT
Start: 2019-06-13 | End: 2019-06-15

## 2019-06-13 RX ORDER — MIDAZOLAM HYDROCHLORIDE 1 MG/ML
INJECTION INTRAMUSCULAR; INTRAVENOUS
Status: COMPLETED
Start: 2019-06-13 | End: 2019-06-13

## 2019-06-13 RX ORDER — MIDAZOLAM HYDROCHLORIDE 5 MG/ML
INJECTION INTRAMUSCULAR; INTRAVENOUS
Status: DISCONTINUED
Start: 2019-06-13 | End: 2019-06-13 | Stop reason: WASHOUT

## 2019-06-13 RX ORDER — IPRATROPIUM BROMIDE AND ALBUTEROL SULFATE 2.5; .5 MG/3ML; MG/3ML
1 SOLUTION RESPIRATORY (INHALATION)
Status: DISCONTINUED | OUTPATIENT
Start: 2019-06-13 | End: 2019-06-15

## 2019-06-13 RX ORDER — PROPOFOL 10 MG/ML
10 INJECTION, EMULSION INTRAVENOUS
Status: DISCONTINUED | OUTPATIENT
Start: 2019-06-13 | End: 2019-06-15

## 2019-06-13 RX ORDER — PROPOFOL 10 MG/ML
INJECTION, EMULSION INTRAVENOUS
Status: DISCONTINUED
Start: 2019-06-13 | End: 2019-06-13

## 2019-06-13 RX ORDER — METHYLPREDNISOLONE SODIUM SUCCINATE 40 MG/ML
40 INJECTION, POWDER, LYOPHILIZED, FOR SOLUTION INTRAMUSCULAR; INTRAVENOUS EVERY 6 HOURS
Status: DISCONTINUED | OUTPATIENT
Start: 2019-06-13 | End: 2019-06-15

## 2019-06-13 RX ORDER — SODIUM CHLORIDE 9 MG/ML
INJECTION, SOLUTION INTRAVENOUS
Status: COMPLETED
Start: 2019-06-13 | End: 2019-06-13

## 2019-06-13 RX ORDER — MIDAZOLAM HYDROCHLORIDE 1 MG/ML
2 INJECTION INTRAMUSCULAR; INTRAVENOUS ONCE
Status: COMPLETED | OUTPATIENT
Start: 2019-06-13 | End: 2019-06-13

## 2019-06-13 RX ORDER — SODIUM CHLORIDE 0.9 % (FLUSH) 0.9 %
10 SYRINGE (ML) INJECTION PRN
Status: DISCONTINUED | OUTPATIENT
Start: 2019-06-13 | End: 2019-06-18 | Stop reason: HOSPADM

## 2019-06-13 RX ORDER — SUCCINYLCHOLINE/SOD CL,ISO/PF 200MG/10ML
1 SYRINGE (ML) INTRAVENOUS ONCE
Status: COMPLETED | OUTPATIENT
Start: 2019-06-13 | End: 2019-06-13

## 2019-06-13 RX ORDER — PANTOPRAZOLE SODIUM 40 MG/10ML
40 INJECTION, POWDER, LYOPHILIZED, FOR SOLUTION INTRAVENOUS DAILY
Status: DISCONTINUED | OUTPATIENT
Start: 2019-06-14 | End: 2019-06-15

## 2019-06-13 RX ORDER — ETOMIDATE 2 MG/ML
0.3 INJECTION INTRAVENOUS ONCE
Status: COMPLETED | OUTPATIENT
Start: 2019-06-13 | End: 2019-06-13

## 2019-06-13 RX ORDER — 0.9 % SODIUM CHLORIDE 0.9 %
1000 INTRAVENOUS SOLUTION INTRAVENOUS ONCE
Status: COMPLETED | OUTPATIENT
Start: 2019-06-13 | End: 2019-06-13

## 2019-06-13 RX ORDER — ONDANSETRON 2 MG/ML
4 INJECTION INTRAMUSCULAR; INTRAVENOUS EVERY 6 HOURS PRN
Status: DISCONTINUED | OUTPATIENT
Start: 2019-06-13 | End: 2019-06-18 | Stop reason: HOSPADM

## 2019-06-13 RX ORDER — PROPOFOL 10 MG/ML
10 INJECTION, EMULSION INTRAVENOUS
Status: DISCONTINUED | OUTPATIENT
Start: 2019-06-13 | End: 2019-06-13 | Stop reason: SDUPTHER

## 2019-06-13 RX ADMIN — PROPOFOL 40 MCG/KG/MIN: 10 INJECTION, EMULSION INTRAVENOUS at 21:46

## 2019-06-13 RX ADMIN — VANCOMYCIN HYDROCHLORIDE 1500 MG: 10 INJECTION, POWDER, LYOPHILIZED, FOR SOLUTION INTRAVENOUS at 23:06

## 2019-06-13 RX ADMIN — ETOMIDATE 26 MG: 20 INJECTION, SOLUTION INTRAVENOUS at 16:45

## 2019-06-13 RX ADMIN — SODIUM CHLORIDE 1000 ML: 9 INJECTION, SOLUTION INTRAVENOUS at 16:30

## 2019-06-13 RX ADMIN — Medication 86 MG: at 16:45

## 2019-06-13 RX ADMIN — MIDAZOLAM HYDROCHLORIDE 2 MG: 1 INJECTION INTRAMUSCULAR; INTRAVENOUS at 17:57

## 2019-06-13 RX ADMIN — SODIUM CHLORIDE, POTASSIUM CHLORIDE, SODIUM LACTATE AND CALCIUM CHLORIDE: 600; 310; 30; 20 INJECTION, SOLUTION INTRAVENOUS at 21:50

## 2019-06-13 RX ADMIN — METHYLPREDNISOLONE SODIUM SUCCINATE 40 MG: 40 INJECTION, POWDER, FOR SOLUTION INTRAMUSCULAR; INTRAVENOUS at 21:50

## 2019-06-13 RX ADMIN — Medication 10 ML: at 21:51

## 2019-06-13 RX ADMIN — PIPERACILLIN AND TAZOBACTAM 3.38 G: 3; .375 INJECTION, POWDER, FOR SOLUTION INTRAVENOUS at 21:50

## 2019-06-13 RX ADMIN — Medication 10 ML: at 19:36

## 2019-06-13 RX ADMIN — SODIUM CHLORIDE: 900 INJECTION, SOLUTION INTRAVENOUS at 19:37

## 2019-06-13 RX ADMIN — MIDAZOLAM 2 MG: 1 INJECTION INTRAMUSCULAR; INTRAVENOUS at 17:57

## 2019-06-13 RX ADMIN — IPRATROPIUM BROMIDE AND ALBUTEROL SULFATE 1 AMPULE: .5; 3 SOLUTION RESPIRATORY (INHALATION) at 21:47

## 2019-06-13 RX ADMIN — SODIUM CHLORIDE 1000 ML: 9 INJECTION, SOLUTION INTRAVENOUS at 18:30

## 2019-06-13 RX ADMIN — DEXMEDETOMIDINE HYDROCHLORIDE 0.2 MCG/KG/HR: 4 INJECTION, SOLUTION INTRAVENOUS at 18:15

## 2019-06-13 RX ADMIN — PROPOFOL 10 MCG/KG/MIN: 10 INJECTION, EMULSION INTRAVENOUS at 17:15

## 2019-06-13 ASSESSMENT — PULMONARY FUNCTION TESTS
PIF_VALUE: 19
PIF_VALUE: 16

## 2019-06-13 ASSESSMENT — PAIN SCALES - GENERAL: PAINLEVEL_OUTOF10: 10

## 2019-06-13 NOTE — PROGRESS NOTES
Paged to ICU for intubation. Assisted DrKassandra with airway insertion. ETT secured 25 cm at the upper lip. BR/S were bilateral and there were no epigastric ventilatory sounds. Easy cap was positive and condensation was present in ETT. Set up and placed patient on ventilator as per ED physician's orders. Total time 45 minutes.

## 2019-06-13 NOTE — CONSULTS
Name:  Myesha Ordoñez  :  1965  MRN:  [id number]  Room:    DOS:  2019    [address] [facility]  Critical Care  -Resident Consult Note-    PCP:  Sylvester Guadalupe DO  Admitting Physician:  No admitting provider for patient encounter. Consultants: Critical care  Chief Complaint:    Chief Complaint   Patient presents with    Altered Mental Status     hx drug abuse, only responsive to pain currently. History of Present Illness  Myesha Ordoñez is a 48 y.o. male who presents to [facility] ER with AMS     Myesha Ordoñez has a past medical history that includes polysubstance abuse, bipolar disorder, schizophrenia, hepatitis C.     David Kumari presents to the ER with altered mental status. History is provided by ER physician along with family at bedside. He was found on the floor at home and it was unclear how long he was on the ground. He has an extensive history of drug use and overdose. He also has history of bipolar disorder and schizophrenia. His family states that they found what they believe is a methadone bottle laying next to him. On arrival, he was tachypneic and nonverbal.  He did receive 4 mg of Narcan while in route with EMS with no change in mental status. He was subsequently intubated in the ER for airway protection.      ER Course  Upon presentation to the ER, routine labwork was performed which revealed lactic acid 2.8, CK 1944, ALT 60, AST 61, WBC 13.7. Imaging results are as outlined below in the Imaging section of this note. EKG revealed NSR. Upon arrival to the ER, patient was 134/82.   The patient received vancomycin, decadron, ampicillin, acyclovir in the emergency room and was admitted to ICU under the care of Dr. Krista Baron Admission - 6/10/16  1.  Drug overdose with unresponsive state with substantial history of drug  abuse with multiple prior overdoses.    2.  Acute hypoxemic respiratory failure secondary to drug overdose requiring  temporary mechanical ventilation. 3.  Hypothermia on presentation, resolved.    4.  Aspiration pneumonia, resolved.     SECONDARY DISCHARGE DIAGNOSIS:    1.  Bipolar disorder with schizophrenia        ED TRIAGE VITALS  BP: 110/79, Temp: 98.9 °F (37.2 °C), Pulse: 93, Resp: 18, SpO2: 94 %    Vitals:    06/13/19 1625 06/13/19 1720 06/13/19 1839   BP: 134/82  110/79   Pulse: 97  93   Resp: 18  18   Temp: 98.9 °F (37.2 °C)  98.9 °F (37.2 °C)   TempSrc:   Oral   SpO2:  98% 94%   Weight: 191 lb 5 oz (86.8 kg)           Histories  Past Medical History:   Diagnosis Date    Arthritis     Aspiration pneumonia of both lungs due to gastric secretions (HCC) 6/10/2017    Bipolar 1 disorder (HCC)     Hepatitis C     Schizophrenia (HCC)      Past Surgical History:   Procedure Laterality Date    COLONOSCOPY      ENDOSCOPY, COLON, DIAGNOSTIC      EYE SURGERY      FRACTURE SURGERY      JOINT REPLACEMENT      left knee    ORBITAL FRACTURE SURGERY      ROTATOR CUFF REPAIR       Family History   Problem Relation Age of Onset    Mental Illness Mother     High Blood Pressure Father        Home Medications  Prior to Admission medications    Medication Sig Start Date End Date Taking? Authorizing Provider   diazepam (VALIUM) 2 MG tablet Take 2 mg by mouth every 6 hours as needed for Anxiety    Historical Provider, MD   ALPRAZolam (XANAX) 0.5 MG tablet Take 0.5 mg by mouth nightly as needed for Sleep    Historical Provider, MD   LITHIUM CARBONATE PO Take 100 mg by mouth 2 times daily. Historical Provider, MD   FLUoxetine (PROZAC) 10 MG tablet Take 50 mg by mouth 2 times daily. Historical Provider, MD       Allergies  Patient has no known allergies.     Social Hx  Social History     Socioeconomic History    Marital status:      Spouse name: Not on file    Number of children: Not on file    Years of education: Not on file    Highest education level: Not on file   Occupational History    Not on file   Social Needs    Financial resource strain: Not on file    Food insecurity:     Worry: Not on file     Inability: Not on file    Transportation needs:     Medical: Not on file     Non-medical: Not on file   Tobacco Use    Smoking status: Current Every Day Smoker     Packs/day: 0.50     Years: 20.00     Pack years: 10.00     Types: Cigarettes    Smokeless tobacco: Never Used   Substance and Sexual Activity    Alcohol use: Yes     Comment: 6-12 beers daily    Drug use: Yes     Types:  Other-see comments     Comment: pt states friend he was with must have slipped some heroin in his drink and denies taking any drugs    Sexual activity: Yes     Partners: Female   Lifestyle    Physical activity:     Days per week: Not on file     Minutes per session: Not on file    Stress: Not on file   Relationships    Social connections:     Talks on phone: Not on file     Gets together: Not on file     Attends Nondenominational service: Not on file     Active member of club or organization: Not on file     Attends meetings of clubs or organizations: Not on file     Relationship status: Not on file    Intimate partner violence:     Fear of current or ex partner: Not on file     Emotionally abused: Not on file     Physically abused: Not on file     Forced sexual activity: Not on file   Other Topics Concern    Not on file   Social History Narrative    Not on file       Review of Systems  Unable to be obtained    Physical Examination  Vitals:  /79   Pulse 93   Temp 98.9 °F (37.2 °C) (Oral)   Resp 18   Wt 191 lb 5 oz (86.8 kg)   SpO2 94%   BMI 30.88 kg/m²   General Appearance:  Intubated, sedated  HEENT:  NCAT; PERRL; conjunctiva pink, sclera clear  Neck:  no adenopathy, bruit, JVD, tenderness, masses, or nodules; supple, symmetrical, trachea midline, thyroid not enlarged  Lung:  Diminished breath sounds bilaterally with expiratory wheezing; no rhonchi, rales, or crackles  Heart:  regular rate and regular rhythm without murmur, rub, or gallop  Abdomen:  soft, nontender, nondistended; normoactive bowel sounds; no organomegaly  Extremities:  extremities normal, atraumatic, no cyanosis or edema  Musculokeletal:  no joint swelling, no muscle tenderness. ROM normal in all joints of extremities.    Neurologic:  Intubated, sedated, moves all extremities    Laboratory Data  Recent Results (from the past 24 hour(s))   POCT Glucose    Collection Time: 06/13/19  4:14 PM   Result Value Ref Range    Meter Glucose 90 74 - 99 mg/dL   POCT Venous    Collection Time: 06/13/19  4:22 PM   Result Value Ref Range    POC Sodium 138 132 - 146 mmol/L    POC Potassium 4.7 3.5 - 5.0 mmol/L    POC Chloride 98 (L) 100 - 108 mmol/L    CO2 26 22 - 29 mmol/L    POC Anion Gap 14 7 - 16 mmol/L    POC Glucose 96 74 - 99 mg/dl    POC BUN 29 (H) 8 - 23 mg/dL    POC Creatinine 0.8 0.7 - 1.2 mg/dL    GFR Non-African American >60 >=60 mL/min/1.73    GFR African American >60    CBC Auto Differential    Collection Time: 06/13/19  4:30 PM   Result Value Ref Range    WBC 13.7 (H) 4.5 - 11.5 E9/L    RBC 4.50 3.80 - 5.80 E12/L    Hemoglobin 14.3 12.5 - 16.5 g/dL    Hematocrit 43.1 37.0 - 54.0 %    MCV 95.8 80.0 - 99.9 fL    MCH 31.8 26.0 - 35.0 pg    MCHC 33.2 32.0 - 34.5 %    RDW 14.1 11.5 - 15.0 fL    Platelets 486 538 - 968 E9/L    MPV 10.1 7.0 - 12.0 fL    Neutrophils % 74.6 43.0 - 80.0 %    Immature Granulocytes % 0.4 0.0 - 5.0 %    Lymphocytes % 15.0 (L) 20.0 - 42.0 %    Monocytes % 9.9 2.0 - 12.0 %    Eosinophils % 0.0 0.0 - 6.0 %    Basophils % 0.1 0.0 - 2.0 %    Neutrophils # 10.21 (H) 1.80 - 7.30 E9/L    Immature Granulocytes # 0.06 E9/L    Lymphocytes # 2.06 1.50 - 4.00 E9/L    Monocytes # 1.36 (H) 0.10 - 0.95 E9/L    Eosinophils # 0.00 (L) 0.05 - 0.50 E9/L    Basophils # 0.02 0.00 - 0.20 E9/L   Comprehensive Metabolic Panel w/ Reflex to MG    Collection Time: 06/13/19  4:30 PM   Result Value Ref Range    Sodium 139 132 - 146 mmol/L    Potassium reflex Magnesium 5.1 (H) 3.5 - 5.0 mmol/L    Chloride 101 98 - 107 mmol/L    CO2 26 22 - 29 mmol/L    Anion Gap 12 7 - 16 mmol/L    Glucose 96 74 - 99 mg/dL    BUN 27 (H) 6 - 20 mg/dL    CREATININE 0.8 0.7 - 1.2 mg/dL    GFR Non-African American >60 >=60 mL/min/1.73    GFR African American >60     Calcium 9.5 8.6 - 10.2 mg/dL    Total Protein 8.0 6.4 - 8.3 g/dL    Alb 4.2 3.5 - 5.2 g/dL    Total Bilirubin 0.6 0.0 - 1.2 mg/dL    Alkaline Phosphatase 100 40 - 129 U/L    ALT 60 (H) 0 - 40 U/L    AST 61 (H) 0 - 39 U/L   Hepatic Function Panel    Collection Time: 06/13/19  4:30 PM   Result Value Ref Range    Bilirubin, Direct <0.2 0.0 - 0.3 mg/dL    Bilirubin, Indirect see below 0.0 - 1.0 mg/dL   Troponin    Collection Time: 06/13/19  4:30 PM   Result Value Ref Range    Troponin <0.01 0.00 - 0.03 ng/mL   Lipase    Collection Time: 06/13/19  4:30 PM   Result Value Ref Range    Lipase 13 13 - 60 U/L   Protime-INR    Collection Time: 06/13/19  4:30 PM   Result Value Ref Range    Protime 11.1 9.3 - 12.4 sec    INR 1.0    APTT    Collection Time: 06/13/19  4:30 PM   Result Value Ref Range    aPTT 32.9 24.5 - 35.1 sec   TSH without Reflex    Collection Time: 06/13/19  4:30 PM   Result Value Ref Range    TSH 2.300 0.270 - 4.200 uIU/mL   T4    Collection Time: 06/13/19  4:30 PM   Result Value Ref Range    T4, Total 4.8 4.5 - 11.7 mcg/dL   Lactic Acid, Plasma    Collection Time: 06/13/19  4:30 PM   Result Value Ref Range    Lactic Acid 2.8 (H) 0.5 - 2.2 mmol/L   Ammonia    Collection Time: 06/13/19  4:30 PM   Result Value Ref Range    Ammonia 78.0 (H) 16.0 - 60.0 umol/L   CK    Collection Time: 06/13/19  4:30 PM   Result Value Ref Range    Total CK 1,944 (H) 20 - 200 U/L   Serum Drug Screen    Collection Time: 06/13/19  4:30 PM   Result Value Ref Range    Ethanol Lvl <10 mg/dL    Acetaminophen Level <5.0 (L) 10.0 - 15.2 mcg/mL    Salicylate, Serum <4.6 0.0 - 30.0 mg/dL   Blood Gas, Arterial    Collection Time: 06/13/19  4:35 PM   Result Value Ref Range    Date DETECTED Negative < 50ng/mL    Cocaine Metabolite Screen, Urine NOT DETECTED Negative < 300 ng/mL    Opiate Scrn, Ur NOT DETECTED Negative < 300ng/mL    PCP Screen, Urine NOT DETECTED Negative < 25 ng/mL    Methadone Screen, Urine POSITIVE (A) Negative <300 ng/mL    Propoxyphene Scrn, Ur NOT DETECTED Negative <300 ng/mL   Microscopic Urinalysis    Collection Time: 06/13/19  6:17 PM   Result Value Ref Range    WBC, UA NONE 0 - 5 /HPF    RBC, UA NONE 0 - 2 /HPF    Epi Cells RARE /HPF    Bacteria, UA NONE /HPF       Imaging  Ct Head Wo Contrast    Result Date: 6/13/2019  LOCATION: 200 EXAM: CT HEAD WO CONTRAST, CT CERVICAL SPINE WO CONTRAST COMPARISON: None HISTORY: Fall headache TECHNIQUE: Noncontrast helical CT images were performed of the head. Coronal and sagittal reconstructions also obtained. Automated dose control was used for this exam. CONTRAST: No intravenous contrast administered. FINDINGS: HEAD: The ventricles, sulci, and cisterns are normal in size and configuration. The brain parenchyma is normal in density. There is no evidence of intracranial hemorrhage or mass. No extra-axial fluid is seen. The paranasal sinuses are clear. The globes and orbits are normal.  No abnormalities of the calvarium are identified. 1. No acute findings. Ct Cervical Spine Wo Contrast    Result Date: 6/13/2019  LOCATION: 200 EXAM: CT HEAD WO CONTRAST, CT CERVICAL SPINE WO CONTRAST COMPARISON: None HISTORY: Fall headache TECHNIQUE: Noncontrast helical CT images were performed of the head. Coronal and sagittal reconstructions also obtained. Automated dose control was used for this exam. CONTRAST: No intravenous contrast administered. FINDINGS: HEAD: The ventricles, sulci, and cisterns are normal in size and configuration. The brain parenchyma is normal in density. There is no evidence of intracranial hemorrhage or mass. No extra-axial fluid is seen. The paranasal sinuses are clear.   The globes and orbits are normal. No abnormalities of the calvarium are identified. 1. No acute findings. Xr Chest Portable    Result Date: 6/13/2019  LOCATION: 200 CHEST Clinical indication: ET tube placement No previous study for comparison. TECHNIQUE; The portable single frontal view of the chest was obtained in supine position. FINDINGS; Interval placement of ET tube is noted with its tip about 1.9 cm above the bronchial angeles. The image shows plate atelectasis in the right midlung versus fluid along the minor fissure. No infiltrate or focal consolidation. No definite effusion. The trachea is midline. No pneumothorax. No significant focal pleural pathology. The cardiomediastinal shadows show borderline cardiomegaly with minimal pulmonary vascular congestion with no significant hilar adenopathy. 1. Interval placement of ET tube with its tip 1.9 cm above the bronchial angeles. 2. Borderline cardiomegaly with minimal pulmonary vascular congestion. 3. Plate atelectasis in the right midlung versus minimal fluid along the right minor fissure. Xr Chest Portable    Result Date: 5/16/2019  Reading location: 200 Indication: Unresponsive Comparison: Prior chest radiograph from 6/11/2017 Technique: Portable AP upright chest radiograph was obtained. Findings: The cardiomediastinal silhouette is stable in size and contours. There is minimal linear left basilar subsegmental atelectasis versus scarring. No focal consolidation. Costophrenic angles are clear. There is no evidence of pneumothorax. Minimal linear left basilar subsegmental atelectasis versus scarring. Assessment and Plan  Patient is a 48 y.o. male who presented with AMS.    The active problem list is as follows:    · Unresponsive due to likely overdose of methadone  · Acute hypoxemic respiratory failure requiring mechanical ventilation  · Substantial history of drug abuse with multiple prior overdoses  · Elevated CK  · Bipolar disorder with schizophrenia  · Hepatitis C - AC 14 500 30% 5  - Duonebs q4 hours  - Solumedrol 40mgq6  -Start vancomycin and zosyn  - Protonix GI prophylaxis  - Lovenox DVT prophylaxis  - CXR and ABGs in am    The plan was discussed with Dr. Valentina Giraldo. Luis Fernando Hernandez, DO, DO PGYII    Above noted-agree with assessment and plan. The patient took a large amount of methadone in what the family suggests might be a suicide attempt. He has a long history of severe depression made worse recently by the death of his mother with whom he was very close. He was intubated and placed on ventilatory support in the emergency room after he presented unresponsive. There is significant wheezing consistent with his long history of cigarette smoking. Elevated liver enzymes are as noted above. He will require adequate sedation tonight intravenous fluids aerosolized bronchodilators intravenous antibiotics intravenous steroids and DVT prophylaxis. We will see how he is tomorrow and make a judgment as to whether he is a candidate for early weaning from ventilatory support. We have spoken to the patient's ex-wife and his mother and they are aware of his diagnosis and plans as outlined above. Thank you for the opportunity to participate in the care of this challenging patient. ICU Staff Physician note of personal involvement in Care  As the attending physician, I certify that I personally reviewed the patients history and personnally examined the patient to confirm the physical findings described above,  And that I reviewed the relevant imaging studies and available reports. I also discussed the differential diagnosis and all of the proposed management plans with the patient and individuals accompanying the patient to this visit. They had the opportunity to ask questions about the proposed management plans and to have those questions answered.      This patient has a high probability of sudden, clinically significant deterioration, which requires the highest level of physician preparedness to intervene urgently. I managed/supervised life or organ supporting interventions that required frequent physician assessment. I devoted my full attention to the direct care of this patient for the amount of time indicated below. Time I spent with the family or surrogate(s) is included only if the patient was incapable of providing the necessary information or participating in medical decisions - Time devoted to teaching and to any procedures I billed separately is not included.      Critical Care Time: 44 minutes      7:04 PM  6/13/2019

## 2019-06-13 NOTE — ED PROVIDER NOTES
The patient is a 49-year-old male presents the emergency department via EMS to be evaluated for altered mental status. Patient was found down at home. Is unclear how long he was on the ground. When EMS providers arrived on the scene he had a bottle of what they believe his methadone lying next to him. He was tachypneic and nonverbal.  They note that he will move all 4 extremities. Pupils were symmetric and equally reactive to light bilaterally. There is no evidence of trauma or injury. Blood sugar was 135 while in route. He received 4 mg of Narcan while in route, and there is no change in his mental status. The patient has been seen in our emergency department multiple times in the past for polysubstance abuse. Past medical history significant for bipolar 1, schizophrenia, mood disorder. He was noted to be incontinent of urine during transport. There is no vomiting. There is no seizure activity. No further history is available at this time. The history is provided by the EMS personnel. Review of Systems   Unable to perform ROS: Mental status change       Physical Exam   Constitutional: He is not intubated. Alert. Intermittently follows commands. Nonverbal.  Tachypneic. Moves all 4 extremity's. HENT:   Head: Normocephalic and atraumatic. Mouth/Throat: Mucous membranes are dry. Eyes: Pupils are equal, round, and reactive to light. Conjunctivae and EOM are normal.   Cardiovascular: Normal rate, regular rhythm, normal heart sounds and intact distal pulses. No murmur heard. 2+ femoral pulses bilaterally. Pulmonary/Chest: Breath sounds normal. No accessory muscle usage or stridor. Tachypnea noted. He is not intubated. He is in respiratory distress. He has no wheezes. He has no rales. Abdominal: Soft. He exhibits no distension and no mass. There is no tenderness. There is no rebound and no guarding. Musculoskeletal:   No gross deformity of the extremities.   No peripheral edema, erythema, or temperature discrepancy when comparing bilateral extremity's. Neurological: GCS eye subscore is 4. GCS verbal subscore is 1. GCS motor subscore is 5. Alert. Intermittently follows commands. Nonverbal.  Moves all 4 extremities. Skin: Skin is warm and dry. Diaphoresis. Procedures     Intubation Procedure Note    Indication: impending respiratory failure    Consent: Unable to be obtained due to the emergent nature of this procedure. Medications Used: etomidate intravenously and succinycholine intravenously    Procedure: The patient was placed in the appropriate position. Cricoid pressure was not required. Intubation was performed via video laryngoscopy an 8.0 cuffed endotracheal tube. The cuff was then inflated and the tube was secured appropriately at a distance of 25 cm to the dental ridge. Initial confirmation of placement included bilateral breath sounds, an end tidal CO2 detector, absence of sounds over the stomach, tube fogging, adequate chest rise and adequate pulse oximetry reading. A chest x-ray to verify correct placement of the tube has been ordered but is still pending. The patient tolerated the procedure well. Complications: None      Central Line Placement Procedure Note    Indication: vascular access and need for frequent blood draws    Consent: Unable to be obtained due to the emergent nature of this procedure. Procedure: The patient was positioned appropriately and the skin over the right femoral vein was prepped with betadine and draped in a sterile fashion. Local anesthesia was obtained by infiltration using 1% Lidocaine without epinephrine. A large bore needle was used to identify the vein. A guide wire was then inserted into the vein through the needle. A triple lumen catheter was then inserted into the vessel over the guide wire using the Seldinger technique. All ports showed good, free flowing blood return and were flushed with saline solution. The catheter was then securely fastened to the skin with suture at 20 cm. Two sutures were placed into the kit included tube clamp and proximal eyelets. An antibiotic disk was placed and the site was then covered with a sterile dressing. A post procedure X-ray was not indicated. The patient tolerated the procedure well. Complications: None          MDM    ED Course as of Jun 13 1923 Thu Jun 13, 2019 1707 This time the patient has been intubated as described above given his altered mental status and his inability to care for safely given his significant respiratory alkalosis and tachypnea and his inability to care for safely. Confirmatory chest x-ray pending.    [MB]   1921 Updated the family at length. They state that the patient has been clean from drug use for the past 6 months, recent started using drugs once again. Family saw him yesterday and he was under the influence of an unknown substance, but he described as his normal self. Is not been describing headaches, neck stiffness, or fevers. He has no history of seizures. At this time the patient is sedated on propofol and Precedex. Vital signs remained stable. Pending transfer to the ICU. [MB]      ED Course User Index  [MB] Glenys Figueroa DO       EKG Interpretation    Interpreted by emergency department physician    Clinical Impression: Normal sinus rhythm. No ST segment elevations or depressions. No T wave abnormalities or inversions. No prolonged QTC. No toxidrome appearance. No acute changes when compared to EKG from May 16, 2019. Glenys Figueroa     ED Course as of Jun 13 1923 Thu Jun 13, 2019 1707 This time the patient has been intubated as described above given his altered mental status and his inability to care for safely given his significant respiratory alkalosis and tachypnea and his inability to care for safely. Confirmatory chest x-ray pending.    [MB]   1921 Updated the family at length.   They state that the patient has been clean from drug use for the past 6 months, recent started using drugs once again. Family saw him yesterday and he was under the influence of an unknown substance, but he described as his normal self. Is not been describing headaches, neck stiffness, or fevers. He has no history of seizures. At this time the patient is sedated on propofol and Precedex. Vital signs remained stable. Pending transfer to the ICU. [MB]      ED Course User Index  [MB] Yissel Campoverde, DO       --------------------------------------------- PAST HISTORY ---------------------------------------------  Past Medical History:  has a past medical history of Arthritis, Aspiration pneumonia of both lungs due to gastric secretions ([facility] Hepatitis C, and Schizophrenia ([facility] Past Surgical History:  has a past surgical history that includes joint replacement; Endoscopy, colon, diagnostic; Colonoscopy; Rotator cuff repair; Orbital fracture repair; eye surgery; and fracture surgery. Social History:  reports that he has been smoking cigarettes. He has a 10.00 pack-year smoking history. He has never used smokeless tobacco. He reports that he drinks alcohol. He reports that he has current or past drug history. Drug: Other-see comments. Family History: family history includes High Blood Pressure in his father; Mental Illness in his mother. The patients home medications have been reviewed. Allergies: Patient has no known allergies.     -------------------------------------------------- RESULTS -------------------------------------------------    Lab  Results for orders placed or performed during the hospital encounter of 06/13/19   CBC Auto Differential   Result Value Ref Range    WBC 13.7 (H) 4.5 - 11.5 E9/L    RBC 4.50 3.80 - 5.80 E12/L    Hemoglobin 14.3 12.5 - 16.5 g/dL    Hematocrit 43.1 37.0 - 54.0 %    MCV 95.8 80.0 - 99.9 fL    MCH 31.8 26.0 - 35.0 pg    MCHC 33.2 32.0 - 34.5 % RDW 14.1 11.5 - 15.0 fL    Platelets 125 974 - 894 E9/L    MPV 10.1 7.0 - 12.0 fL    Neutrophils % 74.6 43.0 - 80.0 %    Immature Granulocytes % 0.4 0.0 - 5.0 %    Lymphocytes % 15.0 (L) 20.0 - 42.0 %    Monocytes % 9.9 2.0 - 12.0 %    Eosinophils % 0.0 0.0 - 6.0 %    Basophils % 0.1 0.0 - 2.0 %    Neutrophils # 10.21 (H) 1.80 - 7.30 E9/L    Immature Granulocytes # 0.06 E9/L    Lymphocytes # 2.06 1.50 - 4.00 E9/L    Monocytes # 1.36 (H) 0.10 - 0.95 E9/L    Eosinophils # 0.00 (L) 0.05 - 0.50 E9/L    Basophils # 0.02 0.00 - 0.20 E9/L   Comprehensive Metabolic Panel w/ Reflex to MG   Result Value Ref Range    Sodium 139 132 - 146 mmol/L    Potassium reflex Magnesium 5.1 (H) 3.5 - 5.0 mmol/L    Chloride 101 98 - 107 mmol/L    CO2 26 22 - 29 mmol/L    Anion Gap 12 7 - 16 mmol/L    Glucose 96 74 - 99 mg/dL    BUN 27 (H) 6 - 20 mg/dL    CREATININE 0.8 0.7 - 1.2 mg/dL    GFR Non-African American >60 >=60 mL/min/1.73    GFR African American >60     Calcium 9.5 8.6 - 10.2 mg/dL    Total Protein 8.0 6.4 - 8.3 g/dL    Alb 4.2 3.5 - 5.2 g/dL    Total Bilirubin 0.6 0.0 - 1.2 mg/dL    Alkaline Phosphatase 100 40 - 129 U/L    ALT 60 (H) 0 - 40 U/L    AST 61 (H) 0 - 39 U/L   Hepatic Function Panel   Result Value Ref Range    Bilirubin, Direct <0.2 0.0 - 0.3 mg/dL    Bilirubin, Indirect see below 0.0 - 1.0 mg/dL   Troponin   Result Value Ref Range    Troponin <0.01 0.00 - 0.03 ng/mL   Lipase   Result Value Ref Range    Lipase 13 13 - 60 U/L   Protime-INR   Result Value Ref Range    Protime 11.1 9.3 - 12.4 sec    INR 1.0    APTT   Result Value Ref Range    aPTT 32.9 24.5 - 35.1 sec   TSH without Reflex   Result Value Ref Range    TSH 2.300 0.270 - 4.200 uIU/mL   T4   Result Value Ref Range    T4, Total 4.8 4.5 - 11.7 mcg/dL   Blood Gas, Arterial   Result Value Ref Range    Date Analyzed [id number]     Time Analyzed 8134     Source: Blood Arterial     pH, Blood Gas 7.598 (HH) 7.350 - 7.450    PCO2 23.8 (L) 35.0 - 45.0 mmHg    PO2 60.9 60.0 - 100.0 mmHg    HCO3 22.7 22.0 - 26.0 mmol/L    B.E. 3.0 -3.0 - 3.0 mmol/L    O2 Sat 95.5 92.0 - 98.5 %    O2Hb 94.2 94.0 - 97.0 %    COHb 1.1 0.0 - 1.5 %    MetHb 0.3 0.0 - 1.5 %    O2 Content 20.1 mL/dL    HHb 4.4 0.0 - 5.0 %    tHb (est) 15.2 11.5 - 16.5 g/dL    Mode RA     Date Of Collection      Time Collected      Pt Temp 37.0 C     ID [id number]     Lab [id number]     Critical(s) Notified Called to Dr. Cherry Carpio    Urinalysis, reflex to microscopic   Result Value Ref Range    Color, UA Yellow Straw/Yellow    Clarity, UA Clear Clear    Glucose, Ur Negative Negative mg/dL    Bilirubin Urine Negative Negative    Ketones, Urine 15 (A) Negative mg/dL    Specific Gravity, UA <=1.005 1.005 - 1.030    Blood, Urine TRACE (A) Negative    pH, UA 7.0 5.0 - 9.0    Protein, UA Negative Negative mg/dL    Urobilinogen, Urine 0.2 <2.0 E.U./dL    Nitrite, Urine Negative Negative    Leukocyte Esterase, Urine Negative Negative   Lactic Acid, Plasma   Result Value Ref Range    Lactic Acid 2.8 (H) 0.5 - 2.2 mmol/L   Ammonia   Result Value Ref Range    Ammonia 78.0 (H) 16.0 - 60.0 umol/L   CK   Result Value Ref Range    Total CK 1,944 (H) 20 - 200 U/L   Osmolality, Serum   Result Value Ref Range    Osmolality 294 285 - 310 mOsm/Kg   Urine Drug Screen   Result Value Ref Range    Amphetamine Screen, Urine NOT DETECTED Negative <1000 ng/mL    Barbiturate Screen, Ur NOT DETECTED Negative < 200 ng/mL    Benzodiazepine Screen, Urine NOT DETECTED Negative < 200 ng/mL    Cannabinoid Scrn, Ur NOT DETECTED Negative < 50ng/mL    Cocaine Metabolite Screen, Urine NOT DETECTED Negative < 300 ng/mL    Opiate Scrn, Ur NOT DETECTED Negative < 300ng/mL    PCP Screen, Urine NOT DETECTED Negative < 25 ng/mL    Methadone Screen, Urine POSITIVE (A) Negative <300 ng/mL    Propoxyphene Scrn, Ur NOT DETECTED Negative <300 ng/mL   Serum Drug Screen   Result Value Ref Range    Ethanol Lvl <10 mg/dL    Acetaminophen Level <5.0 (L) 10.0 - 30.0 mcg/mL Salicylate, Serum <5.6 0.0 - 30.0 mg/dL   Lithium level   Result Value Ref Range    Lithium Dose Amount Unknown    Microscopic Urinalysis   Result Value Ref Range    WBC, UA NONE 0 - 5 /HPF    RBC, UA NONE 0 - 2 /HPF    Epi Cells RARE /HPF    Bacteria, UA NONE /HPF   POCT Glucose   Result Value Ref Range    Meter Glucose 90 74 - 99 mg/dL   POCT Venous   Result Value Ref Range    POC Sodium 138 132 - 146 mmol/L    POC Potassium 4.7 3.5 - 5.0 mmol/L    POC Chloride 98 (L) 100 - 108 mmol/L    CO2 26 22 - 29 mmol/L    POC Anion Gap 14 7 - 16 mmol/L    POC Glucose 96 74 - 99 mg/dl    POC BUN 29 (H) 8 - 23 mg/dL    POC Creatinine 0.8 0.7 - 1.2 mg/dL    GFR Non-African American >60 >=60 mL/min/1.73    GFR African American >60    EKG 12 Lead   Result Value Ref Range    Ventricular Rate 100 BPM    Atrial Rate 100 BPM    P-R Interval 166 ms    QRS Duration 86 ms    Q-T Interval 354 ms    QTc Calculation (Bazett) 456 ms    P Axis 74 degrees    R Axis 19 degrees    T Axis 46 degrees       Radiology  XR CHEST PORTABLE   Final Result      1. Interval placement of ET tube with its tip 1.9 cm above the   bronchial [place]. 2. Borderline cardiomegaly with minimal pulmonary vascular congestion. 3. Plate atelectasis in the right midlung versus minimal fluid along   the right minor fissure. CT Cervical Spine WO Contrast   Final Result      1. No acute findings. CT Head WO Contrast   Final Result      1. No acute findings. ------------------------- NURSING NOTES AND VITALS REVIEWED ---------------------------  Date / Time Roomed:  6/13/2019  4:10 PM  ED Bed Assignment:  08/08    The nursing notes within the ED encounter and vital signs as below have been reviewed.    Patient Vitals for the past 24 hrs:   BP Temp Temp src Pulse Resp SpO2 Weight   06/13/19 1839 110/79 98.9 °F (37.2 °C) Oral 93 18 94 % --   06/13/19 1720 -- -- -- -- -- 98 % --   06/13/19 1625 134/82 98.9 °F (37.2 °C) -- 97 18 -- 191 lb 5 oz (86.8 DO  Resident  06/13/19 8041

## 2019-06-13 NOTE — H&P
[facility]  Internal Medicine  -Resident History & Physical-    PCP:  Francisco Vigil DO  Admitting Physician:  No admitting provider for patient encounter. Consultants:  Critical Care  Chief Complaint:    Chief Complaint   Patient presents with    Altered Mental Status     hx drug abuse, only responsive to pain currently. History of Present Illness  Kate Ellington is a 48 y.o. male who presents to [facility] ER with AMS    Kate Ellington has a past medical history that includes polysubstance abuse, bipolar disorder, schizophrenia, hepatitis Emily Andrade presents to the ER with altered mental status. History is provided by ER physician along with family at bedside. He was found on the floor at home and it was unclear how long he was on the ground. One family member found him around 1:30pm with two friends visiting, whom family states are drug users. He had soiled himself, but woke up. Then, family found him again, unconscious, around 3pm. He has been buying xanex off the street. Family thinks the bottle of methadone came from the friends. He has an extensive history of drug use and overdose. He also has history of bipolar disorder and schizophrenia. His family states that they found what they believe is a methadone bottle laying next to him. On arrival, he was tachypneic and nonverbal.  He did receive 4 mg of Narcan while in route with EMS with no change in mental status. ER Course  Upon presentation to the ER, routine labwork was performed which revealed lactic acid 2.8, CK 1944, ALT 60, AST 61, WBC 13.7. Imaging results are as outlined below in the Imaging section of this note. EKG revealed NSR. Upon arrival to the ER, patient was 134/82. The patient received vancomycin, decadron, ampicillin, acyclovir in the emergency room and was admitted to ICU under the care of Dr. Jordyn Rodriguez. Last Hospital Admission - 6/10/16  1.   Drug overdose with unresponsive state with substantial history of drug  abuse with multiple prior overdoses. 2.  Acute hypoxemic respiratory failure secondary to drug overdose requiring  temporary mechanical ventilation. 3.  Hypothermia on presentation, resolved. 4.  Aspiration pneumonia, resolved.     SECONDARY DISCHARGE DIAGNOSIS:    1. Bipolar disorder with schizophrenia. 2.  Hepatitis. Last Echocardiogram -   None     ED TRIAGE VITALS  BP: 134/82, Temp: 98.9 °F (37.2 °C), Pulse: 97, Resp: 18, SpO2: 98 %    Vitals:    06/13/19 1625 06/13/19 1720   BP: 134/82    Pulse: 97    Resp: 18    Temp: 98.9 °F (37.2 °C)    SpO2:  98%   Weight: 191 lb 5 oz (86.8 kg)          Histories  Past Medical History:   Diagnosis Date    Arthritis     Aspiration pneumonia of both lungs due to gastric secretions (HCC) 6/10/2017    Bipolar 1 disorder (HCC)     Hepatitis C     Schizophrenia (HCC)      Past Surgical History:   Procedure Laterality Date    COLONOSCOPY      ENDOSCOPY, COLON, DIAGNOSTIC      EYE SURGERY      FRACTURE SURGERY      JOINT REPLACEMENT      left knee    ORBITAL FRACTURE SURGERY      ROTATOR CUFF REPAIR       Family History   Problem Relation Age of Onset    Mental Illness Mother     High Blood Pressure Father        Home Medications  Prior to Admission medications    Medication Sig Start Date End Date Taking? Authorizing Provider   diazepam (VALIUM) 2 MG tablet Take 2 mg by mouth every 6 hours as needed for Anxiety    Historical Provider, MD   ALPRAZolam (XANAX) 0.5 MG tablet Take 0.5 mg by mouth nightly as needed for Sleep    Historical Provider, MD   LITHIUM CARBONATE PO Take 100 mg by mouth 2 times daily. Historical Provider, MD   FLUoxetine (PROZAC) 10 MG tablet Take 50 mg by mouth 2 times daily. Historical Provider, MD       Allergies  Patient has no known allergies.     Social Hx  Social History     Socioeconomic History    Marital status:      Spouse name: Not on file    Number of children: Not on file    Years of education: Not on file    Highest education level: Not on file   Occupational History    Not on file   Social Needs    Financial resource strain: Not on file    Food insecurity:     Worry: Not on file     Inability: Not on file    Transportation needs:     Medical: Not on file     Non-medical: Not on file   Tobacco Use    Smoking status: Current Every Day Smoker     Packs/day: 0.50     Years: 20.00     Pack years: 10.00     Types: Cigarettes    Smokeless tobacco: Never Used   Substance and Sexual Activity    Alcohol use: Yes     Comment: 6-12 beers daily    Drug use: Yes     Types:  Other-see comments     Comment: pt states friend he was with must have slipped some heroin in his drink and denies taking any drugs    Sexual activity: Yes     Partners: Female   Lifestyle    Physical activity:     Days per week: Not on file     Minutes per session: Not on file    Stress: Not on file   Relationships    Social connections:     Talks on phone: Not on file     Gets together: Not on file     Attends Worship service: Not on file     Active member of club or organization: Not on file     Attends meetings of clubs or organizations: Not on file     Relationship status: Not on file    Intimate partner violence:     Fear of current or ex partner: Not on file     Emotionally abused: Not on file     Physically abused: Not on file     Forced sexual activity: Not on file   Other Topics Concern    Not on file   Social History Narrative    Not on file       Review of Systems  Unable to be obtained secondary to patient condition    Physical Examination   Vitals:  /82   Pulse 97   Temp 98.9 °F (37.2 °C)   Resp 18   Wt 191 lb 5 oz (86.8 kg)   SpO2 98%   BMI 30.88 kg/m²   General Appearance:  Intubated, sedated, appears to be in no pain, tattoos  HEENT:  NCAT; no obvious trauma  Neck:  no adenopathy, JVD  Lung:  clear to auscultation bilaterally; intubated, diminished  Heart:  regular rate and regular rhythm Value Ref Range    Sodium 139 132 - 146 mmol/L    Potassium reflex Magnesium 5.1 (H) 3.5 - 5.0 mmol/L    Chloride 101 98 - 107 mmol/L    CO2 26 22 - 29 mmol/L    Anion Gap 12 7 - 16 mmol/L    Glucose 96 74 - 99 mg/dL    BUN 27 (H) 6 - 20 mg/dL    CREATININE 0.8 0.7 - 1.2 mg/dL    GFR Non-African American >60 >=60 mL/min/1.73    GFR African American >60     Calcium 9.5 8.6 - 10.2 mg/dL    Total Protein 8.0 6.4 - 8.3 g/dL    Alb 4.2 3.5 - 5.2 g/dL    Total Bilirubin 0.6 0.0 - 1.2 mg/dL    Alkaline Phosphatase 100 40 - 129 U/L    ALT 60 (H) 0 - 40 U/L    AST 61 (H) 0 - 39 U/L   Hepatic Function Panel    Collection Time: 06/13/19  4:30 PM   Result Value Ref Range    Bilirubin, Direct <0.2 0.0 - 0.3 mg/dL    Bilirubin, Indirect see below 0.0 - 1.0 mg/dL   Troponin    Collection Time: 06/13/19  4:30 PM   Result Value Ref Range    Troponin <0.01 0.00 - 0.03 ng/mL   Lipase    Collection Time: 06/13/19  4:30 PM   Result Value Ref Range    Lipase 13 13 - 60 U/L   Protime-INR    Collection Time: 06/13/19  4:30 PM   Result Value Ref Range    Protime 11.1 9.3 - 12.4 sec    INR 1.0    APTT    Collection Time: 06/13/19  4:30 PM   Result Value Ref Range    aPTT 32.9 24.5 - 35.1 sec   TSH without Reflex    Collection Time: 06/13/19  4:30 PM   Result Value Ref Range    TSH 2.300 0.270 - 4.200 uIU/mL   T4    Collection Time: 06/13/19  4:30 PM   Result Value Ref Range    T4, Total 4.8 4.5 - 11.7 mcg/dL   Lactic Acid, Plasma    Collection Time: 06/13/19  4:30 PM   Result Value Ref Range    Lactic Acid 2.8 (H) 0.5 - 2.2 mmol/L   CK    Collection Time: 06/13/19  4:30 PM   Result Value Ref Range    Total CK 1,944 (H) 20 - 200 U/L   Serum Drug Screen    Collection Time: 06/13/19  4:30 PM   Result Value Ref Range    Ethanol Lvl <10 mg/dL    Acetaminophen Level <5.0 (L) 10.0 - 69.5 mcg/mL    Salicylate, Serum <2.4 0.0 - 30.0 mg/dL   Blood Gas, Arterial    Collection Time: 06/13/19  4:35 PM   Result Value Ref Range    Date Analyzed [id number] Time Analyzed 1635     Source: Blood Arterial     pH, Blood Gas 7.598 (HH) 7.350 - 7.450    PCO2 23.8 (L) 35.0 - 45.0 mmHg    PO2 60.9 60.0 - 100.0 mmHg    HCO3 22.7 22.0 - 26.0 mmol/L    B.E. 3.0 -3.0 - 3.0 mmol/L    O2 Sat 95.5 92.0 - 98.5 %    O2Hb 94.2 94.0 - 97.0 %    COHb 1.1 0.0 - 1.5 %    MetHb 0.3 0.0 - 1.5 %    O2 Content 20.1 mL/dL    HHb 4.4 0.0 - 5.0 %    tHb (est) 15.2 11.5 - 16.5 g/dL    Mode RA     Date Of Collection      Time Collected      Pt Temp 37.0 C     ID [id number]     Lab [id number]     Critical(s) Notified Called to Dr. Jessica Robertson, Serum    Collection Time: 06/13/19  4:39 PM   Result Value Ref Range    Osmolality 294 285 - 310 mOsm/Kg   Lithium level    Collection Time: 06/13/19  4:39 PM   Result Value Ref Range    Lithium Dose Amount Unknown    EKG 12 Lead    Collection Time: 06/13/19  5:22 PM   Result Value Ref Range    Ventricular Rate 100 BPM    Atrial Rate 100 BPM    P-R Interval 166 ms    QRS Duration 86 ms    Q-T Interval 354 ms    QTc Calculation (Bazett) 456 ms    P Axis 74 degrees    R Axis 19 degrees    T Axis 46 degrees       Imaging  Ct Head Wo Contrast    Result Date: 6/13/2019  LOCATION: 200 EXAM: CT HEAD WO CONTRAST, CT CERVICAL SPINE WO CONTRAST COMPARISON: None HISTORY: Fall headache TECHNIQUE: Noncontrast helical CT images were performed of the head. Coronal and sagittal reconstructions also obtained. Automated dose control was used for this exam. CONTRAST: No intravenous contrast administered. FINDINGS: HEAD: The ventricles, sulci, and cisterns are normal in size and configuration. The brain parenchyma is normal in density. There is no evidence of intracranial hemorrhage or mass. No extra-axial fluid is seen. The paranasal sinuses are clear. The globes and orbits are normal.  No abnormalities of the calvarium are identified. 1. No acute findings.     Ct Cervical Spine Wo Contrast    Result Date: 6/13/2019  LOCATION: 200 EXAM: CT HEAD WO CONTRAST, CT CERVICAL SPINE WO CONTRAST COMPARISON: None HISTORY: Fall headache TECHNIQUE: Noncontrast helical CT images were performed of the head. Coronal and sagittal reconstructions also obtained. Automated dose control was used for this exam. CONTRAST: No intravenous contrast administered. FINDINGS: HEAD: The ventricles, sulci, and cisterns are normal in size and configuration. The brain parenchyma is normal in density. There is no evidence of intracranial hemorrhage or mass. No extra-axial fluid is seen. The paranasal sinuses are clear. The globes and orbits are normal.  No abnormalities of the calvarium are identified. 1. No acute findings. Xr Chest Portable    Result Date: 6/13/2019  LOCATION: 200 CHEST Clinical indication: ET tube placement No previous study for comparison. TECHNIQUE; The portable single frontal view of the chest was obtained in supine position. FINDINGS; Interval placement of ET tube is noted with its tip about 1.9 cm above the bronchial angeles. The image shows plate atelectasis in the right midlung versus fluid along the minor fissure. No infiltrate or focal consolidation. No definite effusion. The trachea is midline. No pneumothorax. No significant focal pleural pathology. The cardiomediastinal shadows show borderline cardiomegaly with minimal pulmonary vascular congestion with no significant hilar adenopathy. 1. Interval placement of ET tube with its tip 1.9 cm above the bronchial angeles. 2. Borderline cardiomegaly with minimal pulmonary vascular congestion. 3. Plate atelectasis in the right midlung versus minimal fluid along the right minor fissure. Xr Chest Portable    Result Date: 5/16/2019  Reading location: 200 Indication: Unresponsive Comparison: Prior chest radiograph from 6/11/2017 Technique: Portable AP upright chest radiograph was obtained. Findings: The cardiomediastinal silhouette is stable in size and contours.  There is minimal linear left basilar subsegmental atelectasis versus scarring. No focal consolidation. Costophrenic angles are clear. There is no evidence of pneumothorax. Minimal linear left basilar subsegmental atelectasis versus scarring. Assessment and Plan  Patient is a 48 y.o. male who presented with AMS. The active problem list is as follows:      · Unresponsive due to likely overdose of methadone  · Acute hypoxemic respiratory failure requiring mechanical ventilation  · Substantial history of drug abuse with multiple prior overdoses  · Elevated CK  · Bipolar disorder with schizophrenia  · Hepatitis C   · Hyperkalemia from hemolyzed specimen   · Elevated transaminases      Admit to ICU  CC consulted  Family updated at bedside   IVF    · Routine labs in the morning. · DVT prophylaxis. · Please see orders for further management and care.     Electronically signed by Tricia Gonzalez DO on 6/13/2019 at 7:23 PM

## 2019-06-13 NOTE — ED NOTES
At this time, report called to [facility] in ICU.       Mayda Lugo RN  06/13/19 1948

## 2019-06-14 ENCOUNTER — APPOINTMENT (OUTPATIENT)
Dept: GENERAL RADIOLOGY | Age: 54
DRG: 812 | End: 2019-06-14
Payer: COMMERCIAL

## 2019-06-14 LAB
ALBUMIN SERPL-MCNC: 3.7 G/DL (ref 3.5–5.2)
ALP BLD-CCNC: 76 U/L (ref 40–129)
ALT SERPL-CCNC: 45 U/L (ref 0–40)
AMMONIA: 28 UMOL/L (ref 16–60)
ANION GAP SERPL CALCULATED.3IONS-SCNC: 8 MMOL/L (ref 7–16)
AST SERPL-CCNC: 49 U/L (ref 0–39)
B.E.: 2.7 MMOL/L (ref -3–3)
BILIRUB SERPL-MCNC: 0.5 MG/DL (ref 0–1.2)
BUN BLDV-MCNC: 18 MG/DL (ref 6–20)
CALCIUM SERPL-MCNC: 8.5 MG/DL (ref 8.6–10.2)
CHLORIDE BLD-SCNC: 105 MMOL/L (ref 98–107)
CHOLESTEROL, TOTAL: 117 MG/DL (ref 0–199)
CO2: 29 MMOL/L (ref 22–29)
COHB: 0.3 % (ref 0–1.5)
CREAT SERPL-MCNC: 0.9 MG/DL (ref 0.7–1.2)
CRITICAL: ABNORMAL
DATE ANALYZED: ABNORMAL
DATE OF COLLECTION: ABNORMAL
EKG ATRIAL RATE: 100 BPM
EKG P AXIS: 74 DEGREES
EKG P-R INTERVAL: 166 MS
EKG Q-T INTERVAL: 354 MS
EKG QRS DURATION: 86 MS
EKG QTC CALCULATION (BAZETT): 456 MS
EKG R AXIS: 19 DEGREES
EKG T AXIS: 46 DEGREES
EKG VENTRICULAR RATE: 100 BPM
FIO2: 30 %
GFR AFRICAN AMERICAN: >60
GFR NON-AFRICAN AMERICAN: >60 ML/MIN/1.73
GLUCOSE BLD-MCNC: 139 MG/DL (ref 74–99)
HBA1C MFR BLD: 5.6 % (ref 4–5.6)
HCO3: 26.7 MMOL/L (ref 22–26)
HCT VFR BLD CALC: 39.7 % (ref 37–54)
HDLC SERPL-MCNC: 27 MG/DL
HEMOGLOBIN: 13.1 G/DL (ref 12.5–16.5)
HHB: 8.1 % (ref 0–5)
LAB: ABNORMAL
LDL CHOLESTEROL CALCULATED: 74 MG/DL (ref 0–99)
LITHIUM DOSE AMOUNT: ABNORMAL
LITHIUM LEVEL: <0.1 MMOL/L (ref 0.5–1.5)
Lab: ABNORMAL
MAGNESIUM: 2.2 MG/DL (ref 1.6–2.6)
MCH RBC QN AUTO: 31.6 PG (ref 26–35)
MCHC RBC AUTO-ENTMCNC: 33 % (ref 32–34.5)
MCV RBC AUTO: 95.7 FL (ref 80–99.9)
METHB: 0.4 % (ref 0–1.5)
MODE: AC
O2 CONTENT: 17.9 ML/DL
O2 SATURATION: 91.8 % (ref 92–98.5)
O2HB: 91.2 % (ref 94–97)
OPERATOR ID: 797
PATIENT TEMP: 37 C
PCO2: 38.7 MMHG (ref 35–45)
PDW BLD-RTO: 14 FL (ref 11.5–15)
PEEP/CPAP: 5 CMH2O
PFO2: 2.05 MMHG/%
PH BLOOD GAS: 7.46 (ref 7.35–7.45)
PHOSPHORUS: 1.3 MG/DL (ref 2.5–4.5)
PHOSPHORUS: 2.9 MG/DL (ref 2.5–4.5)
PLATELET # BLD: 208 E9/L (ref 130–450)
PMV BLD AUTO: 10 FL (ref 7–12)
PO2: 61.5 MMHG (ref 60–100)
POTASSIUM SERPL-SCNC: 5.2 MMOL/L (ref 3.5–5)
PROCALCITONIN: 0.73 NG/ML (ref 0–0.08)
RBC # BLD: 4.15 E12/L (ref 3.8–5.8)
RI(T): 1.62
RR MECHANICAL: 14 B/MIN
SODIUM BLD-SCNC: 142 MMOL/L (ref 132–146)
SOURCE, BLOOD GAS: ABNORMAL
THB: 14 G/DL (ref 11.5–16.5)
TIME ANALYZED: 611
TOTAL CK: 1062 U/L (ref 20–200)
TOTAL PROTEIN: 6.5 G/DL (ref 6.4–8.3)
TRIGL SERPL-MCNC: 79 MG/DL (ref 0–149)
TSH SERPL DL<=0.05 MIU/L-ACNC: 0.59 UIU/ML (ref 0.27–4.2)
VLDLC SERPL CALC-MCNC: 16 MG/DL
VT MECHANICAL: 500 ML
WBC # BLD: 6.9 E9/L (ref 4.5–11.5)

## 2019-06-14 PROCEDURE — 36600 WITHDRAWAL OF ARTERIAL BLOOD: CPT

## 2019-06-14 PROCEDURE — 84443 ASSAY THYROID STIM HORMONE: CPT

## 2019-06-14 PROCEDURE — 82805 BLOOD GASES W/O2 SATURATION: CPT

## 2019-06-14 PROCEDURE — 2000000000 HC ICU R&B

## 2019-06-14 PROCEDURE — 36415 COLL VENOUS BLD VENIPUNCTURE: CPT

## 2019-06-14 PROCEDURE — 2500000003 HC RX 250 WO HCPCS: Performed by: INTERNAL MEDICINE

## 2019-06-14 PROCEDURE — 6360000002 HC RX W HCPCS: Performed by: INTERNAL MEDICINE

## 2019-06-14 PROCEDURE — 83735 ASSAY OF MAGNESIUM: CPT

## 2019-06-14 PROCEDURE — C9113 INJ PANTOPRAZOLE SODIUM, VIA: HCPCS | Performed by: INTERNAL MEDICINE

## 2019-06-14 PROCEDURE — 6370000000 HC RX 637 (ALT 250 FOR IP): Performed by: INTERNAL MEDICINE

## 2019-06-14 PROCEDURE — 84100 ASSAY OF PHOSPHORUS: CPT

## 2019-06-14 PROCEDURE — 94640 AIRWAY INHALATION TREATMENT: CPT

## 2019-06-14 PROCEDURE — 83036 HEMOGLOBIN GLYCOSYLATED A1C: CPT

## 2019-06-14 PROCEDURE — 82550 ASSAY OF CK (CPK): CPT

## 2019-06-14 PROCEDURE — 6360000002 HC RX W HCPCS: Performed by: STUDENT IN AN ORGANIZED HEALTH CARE EDUCATION/TRAINING PROGRAM

## 2019-06-14 PROCEDURE — 85027 COMPLETE CBC AUTOMATED: CPT

## 2019-06-14 PROCEDURE — 93010 ELECTROCARDIOGRAM REPORT: CPT | Performed by: INTERNAL MEDICINE

## 2019-06-14 PROCEDURE — 71045 X-RAY EXAM CHEST 1 VIEW: CPT

## 2019-06-14 PROCEDURE — 2500000003 HC RX 250 WO HCPCS: Performed by: STUDENT IN AN ORGANIZED HEALTH CARE EDUCATION/TRAINING PROGRAM

## 2019-06-14 PROCEDURE — 80053 COMPREHEN METABOLIC PANEL: CPT

## 2019-06-14 PROCEDURE — 82140 ASSAY OF AMMONIA: CPT

## 2019-06-14 PROCEDURE — 2700000000 HC OXYGEN THERAPY PER DAY

## 2019-06-14 PROCEDURE — 87040 BLOOD CULTURE FOR BACTERIA: CPT

## 2019-06-14 PROCEDURE — 36592 COLLECT BLOOD FROM PICC: CPT

## 2019-06-14 PROCEDURE — 2580000003 HC RX 258: Performed by: INTERNAL MEDICINE

## 2019-06-14 PROCEDURE — 80061 LIPID PANEL: CPT

## 2019-06-14 PROCEDURE — 94003 VENT MGMT INPAT SUBQ DAY: CPT

## 2019-06-14 RX ADMIN — PIPERACILLIN AND TAZOBACTAM 3.38 G: 3; .375 INJECTION, POWDER, FOR SOLUTION INTRAVENOUS at 21:33

## 2019-06-14 RX ADMIN — DEXMEDETOMIDINE HYDROCHLORIDE 0.2 MCG/KG/HR: 4 INJECTION, SOLUTION INTRAVENOUS at 08:38

## 2019-06-14 RX ADMIN — METHYLPREDNISOLONE SODIUM SUCCINATE 40 MG: 40 INJECTION, POWDER, FOR SOLUTION INTRAMUSCULAR; INTRAVENOUS at 09:21

## 2019-06-14 RX ADMIN — Medication 10 ML: at 08:25

## 2019-06-14 RX ADMIN — VANCOMYCIN HYDROCHLORIDE 1500 MG: 10 INJECTION, POWDER, LYOPHILIZED, FOR SOLUTION INTRAVENOUS at 11:16

## 2019-06-14 RX ADMIN — PIPERACILLIN AND TAZOBACTAM 3.38 G: 3; .375 INJECTION, POWDER, FOR SOLUTION INTRAVENOUS at 06:01

## 2019-06-14 RX ADMIN — SODIUM CHLORIDE, POTASSIUM CHLORIDE, SODIUM LACTATE AND CALCIUM CHLORIDE: 600; 310; 30; 20 INJECTION, SOLUTION INTRAVENOUS at 23:03

## 2019-06-14 RX ADMIN — PROPOFOL 35 MCG/KG/MIN: 10 INJECTION, EMULSION INTRAVENOUS at 01:31

## 2019-06-14 RX ADMIN — PROPOFOL 20 MCG/KG/MIN: 10 INJECTION, EMULSION INTRAVENOUS at 06:45

## 2019-06-14 RX ADMIN — METHYLPREDNISOLONE SODIUM SUCCINATE 40 MG: 40 INJECTION, POWDER, FOR SOLUTION INTRAMUSCULAR; INTRAVENOUS at 21:26

## 2019-06-14 RX ADMIN — SODIUM CHLORIDE, POTASSIUM CHLORIDE, SODIUM LACTATE AND CALCIUM CHLORIDE: 600; 310; 30; 20 INJECTION, SOLUTION INTRAVENOUS at 16:36

## 2019-06-14 RX ADMIN — METHYLPREDNISOLONE SODIUM SUCCINATE 40 MG: 40 INJECTION, POWDER, FOR SOLUTION INTRAMUSCULAR; INTRAVENOUS at 14:41

## 2019-06-14 RX ADMIN — IPRATROPIUM BROMIDE AND ALBUTEROL SULFATE 1 AMPULE: .5; 3 SOLUTION RESPIRATORY (INHALATION) at 16:17

## 2019-06-14 RX ADMIN — ENOXAPARIN SODIUM 40 MG: 40 INJECTION SUBCUTANEOUS at 08:24

## 2019-06-14 RX ADMIN — IPRATROPIUM BROMIDE AND ALBUTEROL SULFATE 1 AMPULE: .5; 3 SOLUTION RESPIRATORY (INHALATION) at 20:30

## 2019-06-14 RX ADMIN — SODIUM PHOSPHATE, MONOBASIC, MONOHYDRATE 27.78 MMOL: 276; 142 INJECTION, SOLUTION INTRAVENOUS at 11:16

## 2019-06-14 RX ADMIN — PANTOPRAZOLE SODIUM 40 MG: 40 INJECTION, POWDER, FOR SOLUTION INTRAVENOUS at 08:24

## 2019-06-14 RX ADMIN — IPRATROPIUM BROMIDE AND ALBUTEROL SULFATE 1 AMPULE: .5; 3 SOLUTION RESPIRATORY (INHALATION) at 06:38

## 2019-06-14 RX ADMIN — VANCOMYCIN HYDROCHLORIDE 1500 MG: 10 INJECTION, POWDER, LYOPHILIZED, FOR SOLUTION INTRAVENOUS at 23:05

## 2019-06-14 RX ADMIN — IPRATROPIUM BROMIDE AND ALBUTEROL SULFATE 1 AMPULE: .5; 3 SOLUTION RESPIRATORY (INHALATION) at 09:42

## 2019-06-14 RX ADMIN — SODIUM CHLORIDE, POTASSIUM CHLORIDE, SODIUM LACTATE AND CALCIUM CHLORIDE: 600; 310; 30; 20 INJECTION, SOLUTION INTRAVENOUS at 06:45

## 2019-06-14 RX ADMIN — METHYLPREDNISOLONE SODIUM SUCCINATE 40 MG: 40 INJECTION, POWDER, FOR SOLUTION INTRAMUSCULAR; INTRAVENOUS at 04:34

## 2019-06-14 RX ADMIN — Medication 10 ML: at 21:33

## 2019-06-14 RX ADMIN — PIPERACILLIN AND TAZOBACTAM 3.38 G: 3; .375 INJECTION, POWDER, FOR SOLUTION INTRAVENOUS at 14:41

## 2019-06-14 ASSESSMENT — PAIN SCALES - GENERAL
PAINLEVEL_OUTOF10: 0
PAINLEVEL_OUTOF10: 0

## 2019-06-14 ASSESSMENT — PULMONARY FUNCTION TESTS
PIF_VALUE: 16
PIF_VALUE: 11
PIF_VALUE: 24
PIF_VALUE: 23
PIF_VALUE: 15
PIF_VALUE: 15
PIF_VALUE: 23
PIF_VALUE: 16
PIF_VALUE: 13
PIF_VALUE: 15
PIF_VALUE: 16
PIF_VALUE: 21
PIF_VALUE: 16
PIF_VALUE: 16

## 2019-06-14 NOTE — PROGRESS NOTES
Pharmacy Consultation Note  (Antibiotic Dosing and Monitoring)    Initial consult date: 6/13/19  Consulting physician: Dr. Lori Ruff  Drug(s): Vancomycin  Indication: Empiric    Ht Readings from Last 1 Encounters:   06/13/19 5' 7\" (1.702 m)     Wt Readings from Last 1 Encounters:   06/13/19 191 lb 5 oz (86.8 kg)     Age/  Gender IBW DW  Allergy Information   48 y.o.   male 66.1 kg 86.8 kg  Patient has no known allergies. Date  Tmax WBC BUN/CR UOP  (mL/kg/hr) Drug/Dose Time   Given Level(s)   (Time) Comments   6/13  (#1) 99 13.7 27/0.8 -- Vancomycin 1500 mg IV q12hr 2306     6/14  (#2) 99 6.9 18/0.9 1500 mL Vancomycin 1500 mg IV q12hr <1100>  <2300>       (#3)             (#4)             (#5)             (#6)             (#7)             Estimated Creatinine Clearance: 100 mL/min (based on SCr of 0.9 mg/dL). UOP over the past 24 hours:     Intake/Output Summary (Last 24 hours) at 6/14/2019 0959  Last data filed at 6/14/2019 0600  Gross per 24 hour   Intake 1692.9 ml   Output 1550 ml   Net 142.9 ml     Other anti-infectives: Anti-infective Dose Date Initiated Date Stopped   Pip/tazo 3.375g IV q8hr 6/13      Cultures:  available culture and sensitivity results were reviewed in EPIC  Cultures sent and are pending. Culture Date Result    Urine cx 6/13    MRSA nares 6/13    Blood cx 1     Blood cx 2     Respiratory cx       Assessment:  · Consulted by Dr. Lori Ruff to dose/monitor vancomycin  · Goal trough level:  15-20 mcg/mL  · Pt is a 48 yoF who presented to the ICU intubated with suspected methadone overdose. He was initiated on vancomycin/zosyn for pneumonia.    · Serum creatinine today: 0.9 mg/dL; CrCl ~ 100 mL/min; baseline Scr ~ 0.8 mg/mL    Plan:  · Vancomycin 1500 mg IV q12hr  · Level prior to 4th dose  · Follow renal function  · Pharmacist will follow and monitor/adjust dosing as necessary      Thank you for the consult,    Sharie Schilder PharmD, BCPS 6/14/2019 9:59 AM  Pager: [phone]  Ext: [phone]

## 2019-06-14 NOTE — CARE COORDINATION
Discharge Planning: Per ICU rounds and chart review pt on vent presently,  will continue to follow for discharge planning needs. Pt has been visited in past by [facility] staff,  spoke with Brandon Palencia at [facility] and he will have Elza Mccurdy visit pt tomorrow if pt medically stable otherwise Brandon Palencia or Elza Mccurdy will continue to follow and visit pt when pt is stable to discuss chemical dependency treatment options.   Electronically signed by VANDANA Pabon on 6/14/2019 at 12:41 PM

## 2019-06-14 NOTE — PROGRESS NOTES
[facility] [facility]  Critical Care  -Resident Progress Note-    PCP:  Sergio Ruiz DO  Admitting Physician:  Leonarda Harris DO  Consultants:  Critical care  Chief Complaint:    Chief Complaint   Patient presents with    Altered Mental Status     hx drug abuse, only responsive to pain currently. History of Present Illness  Percy Moody was seen and examined at bedside today; son was present for the examination. No acute overnight events were noted. He remains sedated on the ventilator. Plans are to extubate this morning. Review of Systems  Unable to be obtained    Physical Examination  Vitals:  /78   Pulse 79   Temp 99 °F (37.2 °C) (Axillary)   Resp 14   Ht 5' 7\" (1.702 m)   Wt 191 lb 5 oz (86.8 kg)   SpO2 98%   BMI 29.96 kg/m²   General Appearance:  Intubated, sedated  HEENT:  NCAT; PERRL; conjunctiva pink, sclera clear  Neck:  no adenopathy, bruit, JVD, tenderness, masses, or nodules; supple, symmetrical, trachea midline, thyroid not enlarged  Lung:  Diminished breath sounds bilaterally with expiratory wheezing; no rhonchi, rales, or crackles  Heart:  regular rate and regular rhythm without murmur, rub, or gallop  Abdomen:  soft, nontender, nondistended; normoactive bowel sounds; no organomegaly  Extremities:  extremities normal, atraumatic, no cyanosis or edema  Musculokeletal:  no joint swelling, no muscle tenderness. ROM normal in all joints of extremities.    Neurologic:  Intubated, sedated, moves all extremities           Medications  Scheduled Meds    piperacillin-tazobactam  3.375 g Intravenous Q8H    methylPREDNISolone  40 mg Intravenous Q6H    pantoprazole  40 mg Intravenous Daily    enoxaparin  40 mg Subcutaneous Daily    ipratropium-albuterol  1 ampule Inhalation Q4H While awake    sodium chloride flush  10 mL Intravenous 2 times per day    vancomycin  1,500 mg Intravenous Q12H    vancomycin (VANCOCIN) intermittent dosing (placeholder)   Other RX Placeholder Infusion Meds    propofol 15 mcg/kg/min (06/14/19 0650)    dexmedetomidine HCl in NaCl Stopped (06/14/19 8372)    lactated ringers 125 mL/hr at 06/14/19 0645     PRN Meds sodium phosphate IVPB **OR** sodium phosphate IVPB, sodium chloride flush, ondansetron    Laboratory Data  Recent Results (from the past 24 hour(s))   POCT Glucose    Collection Time: 06/13/19  4:14 PM   Result Value Ref Range    Meter Glucose 90 74 - 99 mg/dL   POCT Venous    Collection Time: 06/13/19  4:22 PM   Result Value Ref Range    POC Sodium 138 132 - 146 mmol/L    POC Potassium 4.7 3.5 - 5.0 mmol/L    POC Chloride 98 (L) 100 - 108 mmol/L    CO2 26 22 - 29 mmol/L    POC Anion Gap 14 7 - 16 mmol/L    POC Glucose 96 74 - 99 mg/dl    POC BUN 29 (H) 8 - 23 mg/dL    POC Creatinine 0.8 0.7 - 1.2 mg/dL    GFR Non-African American >60 >=60 mL/min/1.73    GFR African American >60    CBC Auto Differential    Collection Time: 06/13/19  4:30 PM   Result Value Ref Range    WBC 13.7 (H) 4.5 - 11.5 E9/L    RBC 4.50 3.80 - 5.80 E12/L    Hemoglobin 14.3 12.5 - 16.5 g/dL    Hematocrit 43.1 37.0 - 54.0 %    MCV 95.8 80.0 - 99.9 fL    MCH 31.8 26.0 - 35.0 pg    MCHC 33.2 32.0 - 34.5 %    RDW 14.1 11.5 - 15.0 fL    Platelets 979 107 - 249 E9/L    MPV 10.1 7.0 - 12.0 fL    Neutrophils % 74.6 43.0 - 80.0 %    Immature Granulocytes % 0.4 0.0 - 5.0 %    Lymphocytes % 15.0 (L) 20.0 - 42.0 %    Monocytes % 9.9 2.0 - 12.0 %    Eosinophils % 0.0 0.0 - 6.0 %    Basophils % 0.1 0.0 - 2.0 %    Neutrophils # 10.21 (H) 1.80 - 7.30 E9/L    Immature Granulocytes # 0.06 E9/L    Lymphocytes # 2.06 1.50 - 4.00 E9/L    Monocytes # 1.36 (H) 0.10 - 0.95 E9/L    Eosinophils # 0.00 (L) 0.05 - 0.50 E9/L    Basophils # 0.02 0.00 - 0.20 E9/L   Comprehensive Metabolic Panel w/ Reflex to MG    Collection Time: 06/13/19  4:30 PM   Result Value Ref Range    Sodium 139 132 - 146 mmol/L    Potassium reflex Magnesium 5.1 (H) 3.5 - 5.0 mmol/L    Chloride 101 98 - 107 mmol/L    CO2 26 22 - 29 mmol/L    Anion Gap 12 7 - 16 mmol/L    Glucose 96 74 - 99 mg/dL    BUN 27 (H) 6 - 20 mg/dL    CREATININE 0.8 0.7 - 1.2 mg/dL    GFR Non-African American >60 >=60 mL/min/1.73    GFR African American >60     Calcium 9.5 8.6 - 10.2 mg/dL    Total Protein 8.0 6.4 - 8.3 g/dL    Alb 4.2 3.5 - 5.2 g/dL    Total Bilirubin 0.6 0.0 - 1.2 mg/dL    Alkaline Phosphatase 100 40 - 129 U/L    ALT 60 (H) 0 - 40 U/L    AST 61 (H) 0 - 39 U/L   Hepatic Function Panel    Collection Time: 06/13/19  4:30 PM   Result Value Ref Range    Bilirubin, Direct <0.2 0.0 - 0.3 mg/dL    Bilirubin, Indirect see below 0.0 - 1.0 mg/dL   Troponin    Collection Time: 06/13/19  4:30 PM   Result Value Ref Range    Troponin <0.01 0.00 - 0.03 ng/mL   Lipase    Collection Time: 06/13/19  4:30 PM   Result Value Ref Range    Lipase 13 13 - 60 U/L   Protime-INR    Collection Time: 06/13/19  4:30 PM   Result Value Ref Range    Protime 11.1 9.3 - 12.4 sec    INR 1.0    APTT    Collection Time: 06/13/19  4:30 PM   Result Value Ref Range    aPTT 32.9 24.5 - 35.1 sec   TSH without Reflex    Collection Time: 06/13/19  4:30 PM   Result Value Ref Range    TSH 2.300 0.270 - 4.200 uIU/mL   T4    Collection Time: 06/13/19  4:30 PM   Result Value Ref Range    T4, Total 4.8 4.5 - 11.7 mcg/dL   Lactic Acid, Plasma    Collection Time: 06/13/19  4:30 PM   Result Value Ref Range    Lactic Acid 2.8 (H) 0.5 - 2.2 mmol/L   Ammonia    Collection Time: 06/13/19  4:30 PM   Result Value Ref Range    Ammonia 78.0 (H) 16.0 - 60.0 umol/L   CK    Collection Time: 06/13/19  4:30 PM   Result Value Ref Range    Total CK 1,944 (H) 20 - 200 U/L   Serum Drug Screen    Collection Time: 06/13/19  4:30 PM   Result Value Ref Range    Ethanol Lvl <10 mg/dL    Acetaminophen Level <5.0 (L) 10.0 - 40.6 mcg/mL    Salicylate, Serum <5.1 0.0 - 30.0 mg/dL    TCA Scrn NEGATIVE Cutoff:300 ng/mL   Blood Gas, Arterial    Collection Time: 06/13/19  4:35 PM   Result Value Ref Range    Date Analyzed [id number] Time Analyzed 1635     Source: Blood Arterial     pH, Blood Gas 7.598 (HH) 7.350 - 7.450    PCO2 23.8 (L) 35.0 - 45.0 mmHg    PO2 60.9 60.0 - 100.0 mmHg    HCO3 22.7 22.0 - 26.0 mmol/L    B.E. 3.0 -3.0 - 3.0 mmol/L    O2 Sat 95.5 92.0 - 98.5 %    O2Hb 94.2 94.0 - 97.0 %    COHb 1.1 0.0 - 1.5 %    MetHb 0.3 0.0 - 1.5 %    O2 Content 20.1 mL/dL    HHb 4.4 0.0 - 5.0 %    tHb (est) 15.2 11.5 - 16.5 g/dL    Mode RA     Date Of Collection      Time Collected      Pt Temp 37.0 C     ID [id number]     Lab [id number]     Critical(s) Notified Called to Dr. Neel Ogden, Serum    Collection Time: 06/13/19  4:39 PM   Result Value Ref Range    Osmolality 294 285 - 310 mOsm/Kg   Lithium level    Collection Time: 06/13/19  4:39 PM   Result Value Ref Range    Lithium Lvl <0.10 (L) 0.50 - 1.50 mmol/L    Lithium Dose Amount Unknown    EKG 12 Lead    Collection Time: 06/13/19  5:22 PM   Result Value Ref Range    Ventricular Rate 100 BPM    Atrial Rate 100 BPM    P-R Interval 166 ms    QRS Duration 86 ms    Q-T Interval 354 ms    QTc Calculation (Bazett) 456 ms    P Axis 74 degrees    R Axis 19 degrees    T Axis 46 degrees   Urinalysis, reflex to microscopic    Collection Time: 06/13/19  6:17 PM   Result Value Ref Range    Color, UA Yellow Straw/Yellow    Clarity, UA Clear Clear    Glucose, Ur Negative Negative mg/dL    Bilirubin Urine Negative Negative    Ketones, Urine 15 (A) Negative mg/dL    Specific Gravity, UA <=1.005 1.005 - 1.030    Blood, Urine TRACE (A) Negative    pH, UA 7.0 5.0 - 9.0    Protein, UA Negative Negative mg/dL    Urobilinogen, Urine 0.2 <2.0 E.U./dL    Nitrite, Urine Negative Negative    Leukocyte Esterase, Urine Negative Negative   Urine Drug Screen    Collection Time: 06/13/19  6:17 PM   Result Value Ref Range    Amphetamine Screen, Urine NOT DETECTED Negative <1000 ng/mL    Barbiturate Screen, Ur NOT DETECTED Negative < 200 ng/mL    Benzodiazepine Screen, Urine NOT DETECTED Negative < 200 ng/mL Cannabinoid Scrn, Ur NOT DETECTED Negative < 50ng/mL    Cocaine Metabolite Screen, Urine NOT DETECTED Negative < 300 ng/mL    Opiate Scrn, Ur NOT DETECTED Negative < 300ng/mL    PCP Screen, Urine NOT DETECTED Negative < 25 ng/mL    Methadone Screen, Urine POSITIVE (A) Negative <300 ng/mL    Propoxyphene Scrn, Ur NOT DETECTED Negative <300 ng/mL   Microscopic Urinalysis    Collection Time: 06/13/19  6:17 PM   Result Value Ref Range    WBC, UA NONE 0 - 5 /HPF    RBC, UA NONE 0 - 2 /HPF    Epi Cells RARE /HPF    Bacteria, UA NONE /HPF   Lithium level    Collection Time: 06/13/19  9:34 PM   Result Value Ref Range    Lithium Dose Amount Unknown    Blood Gas, Arterial    Collection Time: 06/13/19 10:03 PM   Result Value Ref Range    Date Analyzed [id number]     Time Analyzed 2203     Source: Blood Arterial     pH, Blood Gas 7.393 7.350 - 7.450    PCO2 46.2 (H) 35.0 - 45.0 mmHg    PO2 230.0 (H) 60.0 - 100.0 mmHg    HCO3 27.5 (H) 22.0 - 26.0 mmol/L    B.E. 2.1 -3.0 - 3.0 mmol/L    O2 Sat 99.0 (H) 92.0 - 98.5 %    O2Hb 98.4 (H) 94.0 - 97.0 %    COHb 0.1 0.0 - 1.5 %    MetHb 0.5 0.0 - 1.5 %    O2 Content 19.1 mL/dL    HHb 1.0 0.0 - 5.0 %    tHb (est) 13.4 11.5 - 16.5 g/dL    Mode AC     Rr Mechanical 15.0 b/min    Vt Mechanical 400.0 mL    Peep/Cpap 5.0 cmH2O    Date Of Collection      Time Collected      Pt Temp 37.0 C     ID [id number]     Lab [id number]     Critical(s) Notified .  No Critical Values    Lipid Panel    Collection Time: 06/14/19  6:10 AM   Result Value Ref Range    Cholesterol, Total 117 0 - 199 mg/dL    Triglycerides 79 0 - 149 mg/dL    HDL 27 >40 mg/dL    LDL Calculated 74 0 - 99 mg/dL    VLDL Cholesterol Calculated 16 mg/dL   Magnesium    Collection Time: 06/14/19  6:10 AM   Result Value Ref Range    Magnesium 2.2 1.6 - 2.6 mg/dL   Phosphorus    Collection Time: 06/14/19  6:10 AM   Result Value Ref Range    Phosphorus 1.3 (L) 2.5 - 4.5 mg/dL   TSH without Reflex    Collection Time: 06/14/19  6:10 AM Result Value Ref Range    TSH 0.594 0.270 - 4.200 uIU/mL   Comprehensive Metabolic Panel    Collection Time: 06/14/19  6:10 AM   Result Value Ref Range    Sodium 142 132 - 146 mmol/L    Potassium 5.2 (H) 3.5 - 5.0 mmol/L    Chloride 105 98 - 107 mmol/L    CO2 29 22 - 29 mmol/L    Anion Gap 8 7 - 16 mmol/L    Glucose 139 (H) 74 - 99 mg/dL    BUN 18 6 - 20 mg/dL    CREATININE 0.9 0.7 - 1.2 mg/dL    GFR Non-African American >60 >=60 mL/min/1.73    GFR African American >60     Calcium 8.5 (L) 8.6 - 10.2 mg/dL    Total Protein 6.5 6.4 - 8.3 g/dL    Alb 3.7 3.5 - 5.2 g/dL    Total Bilirubin 0.5 0.0 - 1.2 mg/dL    Alkaline Phosphatase 76 40 - 129 U/L    ALT 45 (H) 0 - 40 U/L    AST 49 (H) 0 - 39 U/L   CBC    Collection Time: 06/14/19  6:10 AM   Result Value Ref Range    WBC 6.9 4.5 - 11.5 E9/L    RBC 4.15 3.80 - 5.80 E12/L    Hemoglobin 13.1 12.5 - 16.5 g/dL    Hematocrit 39.7 37.0 - 54.0 %    MCV 95.7 80.0 - 99.9 fL    MCH 31.6 26.0 - 35.0 pg    MCHC 33.0 32.0 - 34.5 %    RDW 14.0 11.5 - 15.0 fL    Platelets 326 942 - 452 E9/L    MPV 10.0 7.0 - 12.0 fL   CK    Collection Time: 06/14/19  6:10 AM   Result Value Ref Range    Total CK 1,062 (H) 20 - 200 U/L   Blood Gas, Arterial    Collection Time: 06/14/19  6:11 AM   Result Value Ref Range    Date Analyzed [id number]     Time Analyzed 0611     Source: Blood Arterial     pH, Blood Gas 7.456 (H) 7.350 - 7.450    PCO2 38.7 35.0 - 45.0 mmHg    PO2 61.5 60.0 - 100.0 mmHg    HCO3 26.7 (H) 22.0 - 26.0 mmol/L    B.E. 2.7 -3.0 - 3.0 mmol/L    O2 Sat 91.8 (L) 92.0 - 98.5 %    PO2/FIO2 2.05 mmHg/%    RI(T) 1.62     O2Hb 91.2 (L) 94.0 - 97.0 %    COHb 0.3 0.0 - 1.5 %    MetHb 0.4 0.0 - 1.5 %    O2 Content 17.9 mL/dL    HHb 8.1 (H) 0.0 - 5.0 %    tHb (est) 14.0 11.5 - 16.5 g/dL    Mode AC     FIO2 30.0 %    Rr Mechanical 14.0 b/min    Vt Mechanical 500.0 mL    Peep/Cpap 5.0 cmH2O    Date Of Collection      Time Collected      Pt Temp 37.0 C          Lab [id number] Critical(s) Notified . No Critical Values        Imaging  Ct Head Wo Contrast    Result Date: 6/13/2019  LOCATION: 200 EXAM: CT HEAD WO CONTRAST, CT CERVICAL SPINE WO CONTRAST COMPARISON: None HISTORY: Fall headache TECHNIQUE: Noncontrast helical CT images were performed of the head. Coronal and sagittal reconstructions also obtained. Automated dose control was used for this exam. CONTRAST: No intravenous contrast administered. FINDINGS: HEAD: The ventricles, sulci, and cisterns are normal in size and configuration. The brain parenchyma is normal in density. There is no evidence of intracranial hemorrhage or mass. No extra-axial fluid is seen. The paranasal sinuses are clear. The globes and orbits are normal.  No abnormalities of the calvarium are identified. 1. No acute findings. Ct Cervical Spine Wo Contrast    Result Date: 6/13/2019  Location: 200 CT CERVICAL SPINE WITHOUT IV CONTRAST WITH RECONSTRUCTION Clinical indication: status post fall, overdose No previous study for comparison. TECHNIQUE; Serial axial images were obtained through the cervical spine followed by reconstruction in sagittal and coronal planes. CT technique includes automated exposure control. FINDINGS; The vertebral bodies are normal in height. Straight alignment of the cervical spine but without significant spondylolisthesis. The vertebral bodies show no acute compression fracture or listhesis. The visualized pedicles appear unremarkable. The visualized posterior facet joints show no acute fracture or dislocation. . Mild arthritic changes of uncovertebral joints bilaterally at C6-C7 level. The visualized spinous processes appear unremarkable. The odontoid process is unremarkable with no significant rotational abnormality. The C1-C2 junction is unremarkable. The atlanto-occipital joint is intact. The prevertebral space is within normal limits. The airway is intact. The bony spinal canal is intact.  Neural foraminal narrowing, moderate on the right at C3-C4 and C7-T1 levels, severe on the right and moderate on the left at C4-C5 level, mild on the right and severe on the left at C5-C6 level, and moderate bilaterally at C6-C7 level. Intervertebral disc space narrowing, mild at C5-C6 level and severe at C6-C7 and C7-T1 levels. No acute traumatic bony injury. Ct Cervical Spine Wo Contrast    Result Date: 6/13/2019  LOCATION: 200 EXAM: CT HEAD WO CONTRAST, CT CERVICAL SPINE WO CONTRAST COMPARISON: None HISTORY: Fall headache TECHNIQUE: Noncontrast helical CT images were performed of the head. Coronal and sagittal reconstructions also obtained. Automated dose control was used for this exam. CONTRAST: No intravenous contrast administered. FINDINGS: HEAD: The ventricles, sulci, and cisterns are normal in size and configuration. The brain parenchyma is normal in density. There is no evidence of intracranial hemorrhage or mass. No extra-axial fluid is seen. The paranasal sinuses are clear. The globes and orbits are normal.  No abnormalities of the calvarium are identified. 1. No acute findings. Xr Chest Portable    Result Date: 6/14/2019  Reading location: 200 Indication: Intubation, shortness of breath Comparison: Prior chest radiograph from 6/13/2019 Technique: Portable AP upright chest radiograph was obtained. Findings: An endotracheal tube tip terminates within the trachea. The cardiomediastinal silhouette is stable in size and contours. There is slight decreased pulmonary vascular congestion. There is interval improvement of right midlung atelectasis. There is slight increased right basilar atelectasis. Costophrenic angles are clear. There is no evidence of pneumothorax. There are post-ORIF changes of the right proximal humerus. 1. Endotracheal tube tip in appropriate position. 2. Slight decreased perivascular congestion with interval improvement of right midlung atelectasis.  3. Slight increased right basilar atelectasis. Xr Chest Portable    Result Date: 6/13/2019  LOCATION: 200 CHEST Clinical indication: ET tube placement No previous study for comparison. TECHNIQUE; The portable single frontal view of the chest was obtained in supine position. FINDINGS; Interval placement of ET tube is noted with its tip about 1.9 cm above the bronchial angeles. The image shows plate atelectasis in the right midlung versus fluid along the minor fissure. No infiltrate or focal consolidation. No definite effusion. The trachea is midline. No pneumothorax. No significant focal pleural pathology. The cardiomediastinal shadows show borderline cardiomegaly with minimal pulmonary vascular congestion with no significant hilar adenopathy. 1. Interval placement of ET tube with its tip 1.9 cm above the bronchial angeles. 2. Borderline cardiomegaly with minimal pulmonary vascular congestion. 3. Plate atelectasis in the right midlung versus minimal fluid along the right minor fissure. Xr Chest Portable    Result Date: 5/16/2019  Reading location: 200 Indication: Unresponsive Comparison: Prior chest radiograph from 6/11/2017 Technique: Portable AP upright chest radiograph was obtained. Findings: The cardiomediastinal silhouette is stable in size and contours. There is minimal linear left basilar subsegmental atelectasis versus scarring. No focal consolidation. Costophrenic angles are clear. There is no evidence of pneumothorax. Minimal linear left basilar subsegmental atelectasis versus scarring. Microbiology  Blood, urine, sputum        Assessment and Plan  Patient is a 48 y.o. male who presented with unresponsiveness.   The active problem list is as follows:    · Unresponsive due to likely overdose of methadone  · Acute hypoxemic respiratory failure requiring mechanical ventilation  · Substantial history of drug abuse with multiple prior overdoses  · Elevated CK  · Bipolar disorder with schizophrenia  · Hepatitis C        - Duonebs q4 hours  - Solumedrol 40mgq6  -Continue vancomycin and zosyn  - Plans are to extubate this am  - Continue IVF, check CK in am  - Replace phosphrous  - Review home medications when extubated, patients lithium level was negative, also benzos negative on UDS with listed benzos on home meds    GI prophylaxis- protonix   DVT prophylaxis- lovenox    The plan was discussed with Dr. Cristina Alba, PGYII    Above note read. I agree with Dr. Alaina Duggan and plans. Patient seen by me and examined as well as laboratory data reviewed and x-rays reviewed. There are no infiltrates on today's film. He remains with a PO2 in the 60s on 30% FiO2. .  We will stop his sedation (propofol and Precedex), and see if he is able to be placed on a spontaneous breathing trial.  I spoke to the patient's son at the bedside this morning and explained what our plans are. He will need a psychiatry consult as soon as he is capable of communicating. Further studies will be done as clinically indicated. ICU Staff Physician note of personal involvement in Care  As the attending physician, I certify that I personally reviewed the patients history and personnally examined the patient to confirm the physical findings described above,  And that I reviewed the relevant imaging studies and available reports. I also discussed the differential diagnosis and all of the proposed management plans with the patient and individuals accompanying the patient to this visit. They had the opportunity to ask questions about the proposed management plans and to have those questions answered. This patient has a high probability of sudden, clinically significant deterioration, which requires the highest level of physician preparedness to intervene urgently. I managed/supervised life or organ supporting interventions that required frequent physician assessment.    I devoted my full attention to the direct care of this patient for the amount of time indicated below. Time I spent with the family or surrogate(s) is included only if the patient was incapable of providing the necessary information or participating in medical decisions - Time devoted to teaching and to any procedures I billed separately is not included.      Critical Care Time: 38 minutes        7:55 AM  6/14/2019

## 2019-06-14 NOTE — PROGRESS NOTES
C-SSRS Assessment, Domestic Abuse Assessment, Home Medication list, and Preferred pharmacy are not able to be completed at this time due to patient intubated and sedated. Family unaware of current medications being taken by patient. They believe it is just \"some antibiotic, gabapentin, and methadone\".

## 2019-06-14 NOTE — PROGRESS NOTES
No additional urine noted in catheter bag. Large amount of urine soaked in pad. I inspected momin catheter and made adjustments, little urine return. Bladder scan of 401 ml. Momin stopped draining. I inserted new momin catheter. Patient tolerated well. Large amount of urine return. I and another nurse bathed patient and changed pad and gown. Vital signs stable. Continuing to monitor closely.

## 2019-06-14 NOTE — PROGRESS NOTES
Paged to transport vent patient. Transported pt. from  ED to CT scan and then to ICU using manual resuscitation bag. No problems encountered. Cleaned and redressed ED vent. Total time 45 minutes.

## 2019-06-14 NOTE — PROGRESS NOTES
[address] [facility]  Internal Medicine  -Resident Progress Note-    PCP:  Malcom Burdick DO  Admitting Physician:  Subha Mauricio DO  Consultants:  Critical care  Chief Complaint:    Chief Complaint   Patient presents with    Altered Mental Status     hx drug abuse, only responsive to pain currently. History of Present Illness  Jerri Billingsley was seen and examined at bedside today; son was present for the examination. No acute overnight events were noted. He remains sedated on the ventilator. Plans are to extubate this morning. Review of Systems  Unable to be obtained    Physical Examination  Vitals:  /78   Pulse 79   Temp 99 °F (37.2 °C) (Axillary)   Resp 14   Ht 5' 7\" (1.702 m)   Wt 191 lb 5 oz (86.8 kg)   SpO2 98%   BMI 29.96 kg/m²   General Appearance:  Intubated, sedated  HEENT:  NCAT; PERRL; conjunctiva pink, sclera clear  Neck:  no adenopathy, bruit, JVD, tenderness, masses, or nodules; supple, symmetrical, trachea midline, thyroid not enlarged  Lung:  Diminished breath sounds bilaterally with expiratory wheezing; no rhonchi, rales, or crackles  Heart:  regular rate and regular rhythm without murmur, rub, or gallop  Abdomen:  soft, nontender, nondistended; normoactive bowel sounds; no organomegaly  Extremities:  extremities normal, atraumatic, no cyanosis or edema  Musculokeletal:  no joint swelling, no muscle tenderness. ROM normal in all joints of extremities.    Neurologic:  Intubated, sedated, moves all extremities           Medications  Scheduled Meds    piperacillin-tazobactam  3.375 g Intravenous Q8H    methylPREDNISolone  40 mg Intravenous Q6H    pantoprazole  40 mg Intravenous Daily    enoxaparin  40 mg Subcutaneous Daily    ipratropium-albuterol  1 ampule Inhalation Q4H While awake    sodium chloride flush  10 mL Intravenous 2 times per day    vancomycin  1,500 mg Intravenous Q12H    vancomycin (VANCOCIN) intermittent dosing (placeholder)   Other RX Placeholder - 29 mmol/L    Anion Gap 12 7 - 16 mmol/L    Glucose 96 74 - 99 mg/dL    BUN 27 (H) 6 - 20 mg/dL    CREATININE 0.8 0.7 - 1.2 mg/dL    GFR Non-African American >60 >=60 mL/min/1.73    GFR African American >60     Calcium 9.5 8.6 - 10.2 mg/dL    Total Protein 8.0 6.4 - 8.3 g/dL    Alb 4.2 3.5 - 5.2 g/dL    Total Bilirubin 0.6 0.0 - 1.2 mg/dL    Alkaline Phosphatase 100 40 - 129 U/L    ALT 60 (H) 0 - 40 U/L    AST 61 (H) 0 - 39 U/L   Hepatic Function Panel    Collection Time: 06/13/19  4:30 PM   Result Value Ref Range    Bilirubin, Direct <0.2 0.0 - 0.3 mg/dL    Bilirubin, Indirect see below 0.0 - 1.0 mg/dL   Troponin    Collection Time: 06/13/19  4:30 PM   Result Value Ref Range    Troponin <0.01 0.00 - 0.03 ng/mL   Lipase    Collection Time: 06/13/19  4:30 PM   Result Value Ref Range    Lipase 13 13 - 60 U/L   Protime-INR    Collection Time: 06/13/19  4:30 PM   Result Value Ref Range    Protime 11.1 9.3 - 12.4 sec    INR 1.0    APTT    Collection Time: 06/13/19  4:30 PM   Result Value Ref Range    aPTT 32.9 24.5 - 35.1 sec   TSH without Reflex    Collection Time: 06/13/19  4:30 PM   Result Value Ref Range    TSH 2.300 0.270 - 4.200 uIU/mL   T4    Collection Time: 06/13/19  4:30 PM   Result Value Ref Range    T4, Total 4.8 4.5 - 11.7 mcg/dL   Lactic Acid, Plasma    Collection Time: 06/13/19  4:30 PM   Result Value Ref Range    Lactic Acid 2.8 (H) 0.5 - 2.2 mmol/L   Ammonia    Collection Time: 06/13/19  4:30 PM   Result Value Ref Range    Ammonia 78.0 (H) 16.0 - 60.0 umol/L   CK    Collection Time: 06/13/19  4:30 PM   Result Value Ref Range    Total CK 1,944 (H) 20 - 200 U/L   Serum Drug Screen    Collection Time: 06/13/19  4:30 PM   Result Value Ref Range    Ethanol Lvl <10 mg/dL    Acetaminophen Level <5.0 (L) 10.0 - 69.2 mcg/mL    Salicylate, Serum <8.3 0.0 - 30.0 mg/dL    TCA Scrn NEGATIVE Cutoff:300 ng/mL   Blood Gas, Arterial    Collection Time: 06/13/19  4:35 PM   Result Value Ref Range    Date Analyzed [id number] Time Analyzed 1635     Source: Blood Arterial     pH, Blood Gas 7.598 (HH) 7.350 - 7.450    PCO2 23.8 (L) 35.0 - 45.0 mmHg    PO2 60.9 60.0 - 100.0 mmHg    HCO3 22.7 22.0 - 26.0 mmol/L    B.E. 3.0 -3.0 - 3.0 mmol/L    O2 Sat 95.5 92.0 - 98.5 %    O2Hb 94.2 94.0 - 97.0 %    COHb 1.1 0.0 - 1.5 %    MetHb 0.3 0.0 - 1.5 %    O2 Content 20.1 mL/dL    HHb 4.4 0.0 - 5.0 %    tHb (est) 15.2 11.5 - 16.5 g/dL    Mode RA     Date Of Collection      Time Collected      Pt Temp 37.0 C     ID [id number]     Lab [id number]     Critical(s) Notified Called to Dr. Clint Carmona, Serum    Collection Time: 06/13/19  4:39 PM   Result Value Ref Range    Osmolality 294 285 - 310 mOsm/Kg   Lithium level    Collection Time: 06/13/19  4:39 PM   Result Value Ref Range    Lithium Lvl <0.10 (L) 0.50 - 1.50 mmol/L    Lithium Dose Amount Unknown    EKG 12 Lead    Collection Time: 06/13/19  5:22 PM   Result Value Ref Range    Ventricular Rate 100 BPM    Atrial Rate 100 BPM    P-R Interval 166 ms    QRS Duration 86 ms    Q-T Interval 354 ms    QTc Calculation (Bazett) 456 ms    P Axis 74 degrees    R Axis 19 degrees    T Axis 46 degrees   Urinalysis, reflex to microscopic    Collection Time: 06/13/19  6:17 PM   Result Value Ref Range    Color, UA Yellow Straw/Yellow    Clarity, UA Clear Clear    Glucose, Ur Negative Negative mg/dL    Bilirubin Urine Negative Negative    Ketones, Urine 15 (A) Negative mg/dL    Specific Gravity, UA <=1.005 1.005 - 1.030    Blood, Urine TRACE (A) Negative    pH, UA 7.0 5.0 - 9.0    Protein, UA Negative Negative mg/dL    Urobilinogen, Urine 0.2 <2.0 E.U./dL    Nitrite, Urine Negative Negative    Leukocyte Esterase, Urine Negative Negative   Urine Drug Screen    Collection Time: 06/13/19  6:17 PM   Result Value Ref Range    Amphetamine Screen, Urine NOT DETECTED Negative <1000 ng/mL    Barbiturate Screen, Ur NOT DETECTED Negative < 200 ng/mL    Benzodiazepine Screen, Urine NOT DETECTED Negative < 200 ng/mL Result Value Ref Range    TSH 0.594 0.270 - 4.200 uIU/mL   Comprehensive Metabolic Panel    Collection Time: 06/14/19  6:10 AM   Result Value Ref Range    Sodium 142 132 - 146 mmol/L    Potassium 5.2 (H) 3.5 - 5.0 mmol/L    Chloride 105 98 - 107 mmol/L    CO2 29 22 - 29 mmol/L    Anion Gap 8 7 - 16 mmol/L    Glucose 139 (H) 74 - 99 mg/dL    BUN 18 6 - 20 mg/dL    CREATININE 0.9 0.7 - 1.2 mg/dL    GFR Non-African American >60 >=60 mL/min/1.73    GFR African American >60     Calcium 8.5 (L) 8.6 - 10.2 mg/dL    Total Protein 6.5 6.4 - 8.3 g/dL    Alb 3.7 3.5 - 5.2 g/dL    Total Bilirubin 0.5 0.0 - 1.2 mg/dL    Alkaline Phosphatase 76 40 - 129 U/L    ALT 45 (H) 0 - 40 U/L    AST 49 (H) 0 - 39 U/L   CBC    Collection Time: 06/14/19  6:10 AM   Result Value Ref Range    WBC 6.9 4.5 - 11.5 E9/L    RBC 4.15 3.80 - 5.80 E12/L    Hemoglobin 13.1 12.5 - 16.5 g/dL    Hematocrit 39.7 37.0 - 54.0 %    MCV 95.7 80.0 - 99.9 fL    MCH 31.6 26.0 - 35.0 pg    MCHC 33.0 32.0 - 34.5 %    RDW 14.0 11.5 - 15.0 fL    Platelets 537 982 - 032 E9/L    MPV 10.0 7.0 - 12.0 fL   CK    Collection Time: 06/14/19  6:10 AM   Result Value Ref Range    Total CK 1,062 (H) 20 - 200 U/L   Blood Gas, Arterial    Collection Time: 06/14/19  6:11 AM   Result Value Ref Range    Date Analyzed [id number]     Time Analyzed 0611     Source: Blood Arterial     pH, Blood Gas 7.456 (H) 7.350 - 7.450    PCO2 38.7 35.0 - 45.0 mmHg    PO2 61.5 60.0 - 100.0 mmHg    HCO3 26.7 (H) 22.0 - 26.0 mmol/L    B.E. 2.7 -3.0 - 3.0 mmol/L    O2 Sat 91.8 (L) 92.0 - 98.5 %    PO2/FIO2 2.05 mmHg/%    RI(T) 1.62     O2Hb 91.2 (L) 94.0 - 97.0 %    COHb 0.3 0.0 - 1.5 %    MetHb 0.4 0.0 - 1.5 %    O2 Content 17.9 mL/dL    HHb 8.1 (H) 0.0 - 5.0 %    tHb (est) 14.0 11.5 - 16.5 g/dL    Mode AC     FIO2 30.0 %    Rr Mechanical 14.0 b/min    Vt Mechanical 500.0 mL    Peep/Cpap 5.0 cmH2O    Date Of Collection      Time Collected      Pt Temp 37.0 C          Lab [id number] moderate on the right at C3-C4 and C7-T1 levels, severe on the right and moderate on the left at C4-C5 level, mild on the right and severe on the left at C5-C6 level, and moderate bilaterally at C6-C7 level. Intervertebral disc space narrowing, mild at C5-C6 level and severe at C6-C7 and C7-T1 levels. No acute traumatic bony injury. Ct Cervical Spine Wo Contrast    Result Date: 6/13/2019  LOCATION: 200 EXAM: CT HEAD WO CONTRAST, CT CERVICAL SPINE WO CONTRAST COMPARISON: None HISTORY: Fall headache TECHNIQUE: Noncontrast helical CT images were performed of the head. Coronal and sagittal reconstructions also obtained. Automated dose control was used for this exam. CONTRAST: No intravenous contrast administered. FINDINGS: HEAD: The ventricles, sulci, and cisterns are normal in size and configuration. The brain parenchyma is normal in density. There is no evidence of intracranial hemorrhage or mass. No extra-axial fluid is seen. The paranasal sinuses are clear. The globes and orbits are normal.  No abnormalities of the calvarium are identified. 1. No acute findings. Xr Chest Portable    Result Date: 6/14/2019  Reading location: 200 Indication: Intubation, shortness of breath Comparison: Prior chest radiograph from 6/13/2019 Technique: Portable AP upright chest radiograph was obtained. Findings: An endotracheal tube tip terminates within the trachea. The cardiomediastinal silhouette is stable in size and contours. There is slight decreased pulmonary vascular congestion. There is interval improvement of right midlung atelectasis. There is slight increased right basilar atelectasis. Costophrenic angles are clear. There is no evidence of pneumothorax. There are post-ORIF changes of the right proximal humerus. 1. Endotracheal tube tip in appropriate position. 2. Slight decreased perivascular congestion with interval improvement of right midlung atelectasis.  3. Slight increased right basilar atelectasis. Xr Chest Portable    Result Date: 6/13/2019  LOCATION: 200 CHEST Clinical indication: ET tube placement No previous study for comparison. TECHNIQUE; The portable single frontal view of the chest was obtained in supine position. FINDINGS; Interval placement of ET tube is noted with its tip about 1.9 cm above the bronchial angeles. The image shows plate atelectasis in the right midlung versus fluid along the minor fissure. No infiltrate or focal consolidation. No definite effusion. The trachea is midline. No pneumothorax. No significant focal pleural pathology. The cardiomediastinal shadows show borderline cardiomegaly with minimal pulmonary vascular congestion with no significant hilar adenopathy. 1. Interval placement of ET tube with its tip 1.9 cm above the bronchial angeles. 2. Borderline cardiomegaly with minimal pulmonary vascular congestion. 3. Plate atelectasis in the right midlung versus minimal fluid along the right minor fissure. Xr Chest Portable    Result Date: 5/16/2019  Reading location: 200 Indication: Unresponsive Comparison: Prior chest radiograph from 6/11/2017 Technique: Portable AP upright chest radiograph was obtained. Findings: The cardiomediastinal silhouette is stable in size and contours. There is minimal linear left basilar subsegmental atelectasis versus scarring. No focal consolidation. Costophrenic angles are clear. There is no evidence of pneumothorax. Minimal linear left basilar subsegmental atelectasis versus scarring. Microbiology  Blood, urine, sputum        Assessment and Plan  Patient is a 48 y.o. male who presented with unresponsiveness.   The active problem list is as follows:    · Unresponsive due to likely overdose of methadone  · Toxic encephalopathy secondary to above  · Acute hypoxemic respiratory failure requiring mechanical ventilation  · Substantial history of drug abuse with multiple prior overdoses  · Elevated CK  · Bipolar disorder with schizophrenia  · Hepatitis C        - Duonebs q4 hours  - Solumedrol 40mgq6  -Continue vancomycin and zosyn  - Plans are to extubate this am  - Continue IVF, check CK in am  - Replace phosphrous  - Review home medications when extubated, patients lithium level was negative, also benzos negative on UDS with listed benzos on home meds    · Routine labs in AM.  · DVT prophylaxis. lovenox  · Please see orders for further management and care.     The plan was discussed with Dr. Timmy Hyatt    7:50 AM  6/14/2019

## 2019-06-14 NOTE — PROGRESS NOTES
Patient received from ED. Son and ex-wife (patient's) in waiting room. ET tube secure, on vent. VS stable.

## 2019-06-15 LAB
ALBUMIN SERPL-MCNC: 3.6 G/DL (ref 3.5–5.2)
ALP BLD-CCNC: 60 U/L (ref 40–129)
ALT SERPL-CCNC: 51 U/L (ref 0–40)
ANION GAP SERPL CALCULATED.3IONS-SCNC: 10 MMOL/L (ref 7–16)
AST SERPL-CCNC: 57 U/L (ref 0–39)
BILIRUB SERPL-MCNC: 0.7 MG/DL (ref 0–1.2)
BUN BLDV-MCNC: 18 MG/DL (ref 6–20)
CALCIUM SERPL-MCNC: 8.3 MG/DL (ref 8.6–10.2)
CHLORIDE BLD-SCNC: 104 MMOL/L (ref 98–107)
CO2: 27 MMOL/L (ref 22–29)
CREAT SERPL-MCNC: 0.8 MG/DL (ref 0.7–1.2)
GFR AFRICAN AMERICAN: >60
GFR NON-AFRICAN AMERICAN: >60 ML/MIN/1.73
GLUCOSE BLD-MCNC: 127 MG/DL (ref 74–99)
HCT VFR BLD CALC: 36.8 % (ref 37–54)
HEMOGLOBIN: 12.5 G/DL (ref 12.5–16.5)
MAGNESIUM: 2.3 MG/DL (ref 1.6–2.6)
MCH RBC QN AUTO: 31.6 PG (ref 26–35)
MCHC RBC AUTO-ENTMCNC: 34 % (ref 32–34.5)
MCV RBC AUTO: 93.2 FL (ref 80–99.9)
MRSA CULTURE ONLY: NORMAL
PDW BLD-RTO: 13.2 FL (ref 11.5–15)
PHOSPHORUS: 2.3 MG/DL (ref 2.5–4.5)
PLATELET # BLD: 210 E9/L (ref 130–450)
PMV BLD AUTO: 10.3 FL (ref 7–12)
POTASSIUM SERPL-SCNC: 3.9 MMOL/L (ref 3.5–5)
RBC # BLD: 3.95 E12/L (ref 3.8–5.8)
SODIUM BLD-SCNC: 141 MMOL/L (ref 132–146)
TOTAL CK: 856 U/L (ref 20–200)
TOTAL PROTEIN: 6.2 G/DL (ref 6.4–8.3)
URINE CULTURE, ROUTINE: NORMAL
VANCOMYCIN TROUGH: 10.2 MCG/ML (ref 5–16)
WBC # BLD: 11 E9/L (ref 4.5–11.5)

## 2019-06-15 PROCEDURE — 80202 ASSAY OF VANCOMYCIN: CPT

## 2019-06-15 PROCEDURE — 2580000003 HC RX 258: Performed by: INTERNAL MEDICINE

## 2019-06-15 PROCEDURE — 36592 COLLECT BLOOD FROM PICC: CPT

## 2019-06-15 PROCEDURE — 6360000002 HC RX W HCPCS: Performed by: INTERNAL MEDICINE

## 2019-06-15 PROCEDURE — 1200000000 HC SEMI PRIVATE

## 2019-06-15 PROCEDURE — 36415 COLL VENOUS BLD VENIPUNCTURE: CPT

## 2019-06-15 PROCEDURE — C9113 INJ PANTOPRAZOLE SODIUM, VIA: HCPCS | Performed by: INTERNAL MEDICINE

## 2019-06-15 PROCEDURE — 82550 ASSAY OF CK (CPK): CPT

## 2019-06-15 PROCEDURE — 99221 1ST HOSP IP/OBS SF/LOW 40: CPT | Performed by: PSYCHIATRY & NEUROLOGY

## 2019-06-15 PROCEDURE — 83735 ASSAY OF MAGNESIUM: CPT

## 2019-06-15 PROCEDURE — 6370000000 HC RX 637 (ALT 250 FOR IP): Performed by: INTERNAL MEDICINE

## 2019-06-15 PROCEDURE — 94640 AIRWAY INHALATION TREATMENT: CPT

## 2019-06-15 PROCEDURE — 84100 ASSAY OF PHOSPHORUS: CPT

## 2019-06-15 PROCEDURE — 85027 COMPLETE CBC AUTOMATED: CPT

## 2019-06-15 PROCEDURE — 2700000000 HC OXYGEN THERAPY PER DAY

## 2019-06-15 PROCEDURE — 80053 COMPREHEN METABOLIC PANEL: CPT

## 2019-06-15 RX ORDER — IPRATROPIUM BROMIDE AND ALBUTEROL SULFATE 2.5; .5 MG/3ML; MG/3ML
1 SOLUTION RESPIRATORY (INHALATION) EVERY 4 HOURS PRN
Status: DISCONTINUED | OUTPATIENT
Start: 2019-06-15 | End: 2019-06-18 | Stop reason: HOSPADM

## 2019-06-15 RX ORDER — DOCUSATE SODIUM 100 MG/1
100 CAPSULE, LIQUID FILLED ORAL DAILY
Status: DISCONTINUED | OUTPATIENT
Start: 2019-06-15 | End: 2019-06-18 | Stop reason: HOSPADM

## 2019-06-15 RX ADMIN — PIPERACILLIN AND TAZOBACTAM 3.38 G: 3; .375 INJECTION, POWDER, FOR SOLUTION INTRAVENOUS at 06:35

## 2019-06-15 RX ADMIN — IPRATROPIUM BROMIDE AND ALBUTEROL SULFATE 1 AMPULE: .5; 3 SOLUTION RESPIRATORY (INHALATION) at 09:35

## 2019-06-15 RX ADMIN — PANTOPRAZOLE SODIUM 40 MG: 40 INJECTION, POWDER, FOR SOLUTION INTRAVENOUS at 08:34

## 2019-06-15 RX ADMIN — SODIUM CHLORIDE, POTASSIUM CHLORIDE, SODIUM LACTATE AND CALCIUM CHLORIDE: 600; 310; 30; 20 INJECTION, SOLUTION INTRAVENOUS at 07:06

## 2019-06-15 RX ADMIN — VANCOMYCIN HYDROCHLORIDE 1750 MG: 10 INJECTION, POWDER, LYOPHILIZED, FOR SOLUTION INTRAVENOUS at 12:44

## 2019-06-15 RX ADMIN — DOCUSATE SODIUM 100 MG: 100 CAPSULE, LIQUID FILLED ORAL at 14:31

## 2019-06-15 RX ADMIN — PIPERACILLIN AND TAZOBACTAM 3.38 G: 3; .375 INJECTION, POWDER, FOR SOLUTION INTRAVENOUS at 21:47

## 2019-06-15 RX ADMIN — METHYLNALTREXONE BROMIDE 12 MG: 12 INJECTION, SOLUTION SUBCUTANEOUS at 14:32

## 2019-06-15 RX ADMIN — Medication 10 ML: at 08:35

## 2019-06-15 RX ADMIN — METHYLPREDNISOLONE SODIUM SUCCINATE 40 MG: 40 INJECTION, POWDER, FOR SOLUTION INTRAMUSCULAR; INTRAVENOUS at 08:33

## 2019-06-15 RX ADMIN — ENOXAPARIN SODIUM 40 MG: 40 INJECTION SUBCUTANEOUS at 08:33

## 2019-06-15 RX ADMIN — METHYLPREDNISOLONE SODIUM SUCCINATE 40 MG: 40 INJECTION, POWDER, FOR SOLUTION INTRAMUSCULAR; INTRAVENOUS at 03:48

## 2019-06-15 RX ADMIN — PIPERACILLIN AND TAZOBACTAM 3.38 G: 3; .375 INJECTION, POWDER, FOR SOLUTION INTRAVENOUS at 14:29

## 2019-06-15 RX ADMIN — IPRATROPIUM BROMIDE AND ALBUTEROL SULFATE 1 AMPULE: .5; 3 SOLUTION RESPIRATORY (INHALATION) at 06:15

## 2019-06-15 RX ADMIN — SODIUM CHLORIDE, POTASSIUM CHLORIDE, SODIUM LACTATE AND CALCIUM CHLORIDE: 600; 310; 30; 20 INJECTION, SOLUTION INTRAVENOUS at 14:51

## 2019-06-15 ASSESSMENT — PAIN SCALES - GENERAL
PAINLEVEL_OUTOF10: 0
PAINLEVEL_OUTOF10: 0

## 2019-06-15 NOTE — PROGRESS NOTES
P/c through [facility] for Dr. Tati Jesus, connected to answering service and spoke with Jason Hernandez. She said to add Dr. Tati Jesus to the treatment team and he would see the patient.     Arabella Aquino  6/15/2019

## 2019-06-15 NOTE — PROGRESS NOTES
Pulmonary/Critical Care Progress Note    We are following patient for acute resp failure    SUBJECTIVE:  Extubated yesterday    MEDICATIONS:   piperacillin-tazobactam  3.375 g Intravenous Q8H    methylPREDNISolone  40 mg Intravenous Q6H    pantoprazole  40 mg Intravenous Daily    enoxaparin  40 mg Subcutaneous Daily    ipratropium-albuterol  1 ampule Inhalation Q4H While awake    sodium chloride flush  10 mL Intravenous 2 times per day    vancomycin  1,500 mg Intravenous Q12H      propofol Stopped (06/14/19 0955)    dexmedetomidine HCl in NaCl 0.2 mcg/kg/hr (06/14/19 1436)    lactated ringers 125 mL/hr at 06/15/19 0706     sodium phosphate IVPB **OR** sodium phosphate IVPB, sodium chloride flush, ondansetron    Review of Systems   All other systems reviewed and are negative.       OBJECTIVE:  Vitals:    06/15/19 0800   BP: 129/81   Pulse:    Resp:    Temp: 98.2 °F (36.8 °C)   SpO2:      FiO2 : 100 %  O2 Flow Rate (L/min): 0 L/min  O2 Device: None (Room air)    PHYSICAL EXAM:  Constitutional: aox3, lying in bed  Skin: intact, no rashes  HEENT: perrla, eomi  Neck: supple, no jvd  Cardiovascular: RRR, s1, s2  Respiratory: ctab, diminished bibasilarly  Gastrointestinal: soft, nt, nd  Extremities: no c/c/e  Neurological: no focal deficits    LABS:  WBC   Date Value Ref Range Status   06/15/2019 11.0 4.5 - 11.5 E9/L Final   06/14/2019 6.9 4.5 - 11.5 E9/L Final   06/13/2019 13.7 (H) 4.5 - 11.5 E9/L Final     Hemoglobin   Date Value Ref Range Status   06/15/2019 12.5 12.5 - 16.5 g/dL Final   06/14/2019 13.1 12.5 - 16.5 g/dL Final   06/13/2019 14.3 12.5 - 16.5 g/dL Final     Hematocrit   Date Value Ref Range Status   06/15/2019 36.8 (L) 37.0 - 54.0 % Final   06/14/2019 39.7 37.0 - 54.0 % Final   06/13/2019 43.1 37.0 - 54.0 % Final     MCV   Date Value Ref Range Status   06/15/2019 93.2 80.0 - 99.9 fL Final   06/14/2019 95.7 80.0 - 99.9 fL Final   06/13/2019 95.8 80.0 - 99.9 fL Final     Platelets   Date Value Ref Alb   Date Value Ref Range Status   06/15/2019 3.6 3.5 - 5.2 g/dL Final   06/14/2019 3.7 3.5 - 5.2 g/dL Final   06/13/2019 4.2 3.5 - 5.2 g/dL Final     Total Bilirubin   Date Value Ref Range Status   06/15/2019 0.7 0.0 - 1.2 mg/dL Final   06/14/2019 0.5 0.0 - 1.2 mg/dL Final   06/13/2019 0.6 0.0 - 1.2 mg/dL Final     Alkaline Phosphatase   Date Value Ref Range Status   06/15/2019 60 40 - 129 U/L Final   06/14/2019 76 40 - 129 U/L Final   06/13/2019 100 40 - 129 U/L Final     AST   Date Value Ref Range Status   06/15/2019 57 (H) 0 - 39 U/L Final   06/14/2019 49 (H) 0 - 39 U/L Final   06/13/2019 61 (H) 0 - 39 U/L Final     ALT   Date Value Ref Range Status   06/15/2019 51 (H) 0 - 40 U/L Final   06/14/2019 45 (H) 0 - 40 U/L Final   06/13/2019 60 (H) 0 - 40 U/L Final     GFR Non-   Date Value Ref Range Status   06/15/2019 >60 >=60 mL/min/1.73 Final     Comment:     Chronic Kidney Disease: less than 60 ml/min/1.73 sq.m. Kidney Failure: less than 15 ml/min/1.73 sq.m. Results valid for patients 18 years and older. 06/14/2019 >60 >=60 mL/min/1.73 Final     Comment:     Chronic Kidney Disease: less than 60 ml/min/1.73 sq.m. Kidney Failure: less than 15 ml/min/1.73 sq.m. Results valid for patients 18 years and older. 06/13/2019 >60 >=60 mL/min/1.73 Final     Comment:     Chronic Kidney Disease: less than 60 ml/min/1.73 sq.m. Kidney Failure: less than 15 ml/min/1.73 sq.m. Results valid for patients 18 years and older.        GFR    Date Value Ref Range Status   06/15/2019 >60  Final   06/14/2019 >60  Final   06/13/2019 >60  Final     Magnesium   Date Value Ref Range Status   06/15/2019 2.3 1.6 - 2.6 mg/dL Final   06/14/2019 2.2 1.6 - 2.6 mg/dL Final     Phosphorus   Date Value Ref Range Status   06/15/2019 2.3 (L) 2.5 - 4.5 mg/dL Final   06/14/2019 2.9 2.5 - 4.5 mg/dL Final   06/14/2019 1.3 (L) 2.5 - 4.5 mg/dL Final     Recent Labs     06/14/19  0611   PH 7.456*   PO2 61.5   PCO2 38.7   HCO3 26.7*   BE 2.7   O2SAT 91.8*       RADIOLOGY:  XR CHEST PORTABLE   Final Result   1. Endotracheal tube tip in appropriate position. 2. Slight decreased perivascular congestion with interval improvement   of right midlung atelectasis. 3. Slight increased right basilar atelectasis. CT CERVICAL SPINE WO CONTRAST   Final Result      No acute traumatic bony injury. XR CHEST PORTABLE   Final Result      1. Interval placement of ET tube with its tip 1.9 cm above the   bronchial angeles. 2. Borderline cardiomegaly with minimal pulmonary vascular congestion. 3. Plate atelectasis in the right midlung versus minimal fluid along   the right minor fissure. CT Cervical Spine WO Contrast   Final Result      1. No acute findings. CT Head WO Contrast   Final Result      1. No acute findings. PROBLEM LIST:  Principal Problem:    Drug overdose  Active Problems:    Respiratory failure ([address]    Drug overdose, accidental or unintentional, initial encounter  Resolved Problems:    * No resolved hospital problems. *      IMPRESSION:  1. Acute resp failure  2. Overdose  3. Encephalopathy  4. Bipolar disorder/Schizophrenia  5. Rhabdomyolosis    PLAN:  1. Encephalopathy resolved  2. Hemodynamically stable  3. S/p extubation, currently on RA. Atelectatic changes noted  4. Stop steroids  5. Continue lovenox for dvt ppx  6. Continue IVF, monitor UOP  7. On vanc, zosyn  8. Replete PO4    ATTESTATION:  ICU Staff Physician note of personal involvement in Care  As the attending physician, I certify that I personally reviewed the patients history and personnally examined the patient to confirm the physical findings described above,  And that I reviewed the relevant imaging studies and available reports. I also discussed the differential diagnosis and all of the proposed management plans with the patient and individuals accompanying the patient to this visit. They had the opportunity to ask questions about the proposed management plans and to have those questions answered. This patient has a high probability of sudden, clinically significant deterioration, which requires the highest level of physician preparedness to intervene urgently. I managed/supervised life or organ supporting interventions that required frequent physician assessment. I devoted my full attention to the direct care of this patient for the amount of time indicated below. Time I spent with the family or surrogate(s) is included only if the patient was incapable of providing the necessary information or participating in medical decisions - Time devoted to teaching and to any procedures I billed separately is not included.       Electronically signed by Brea Reynoso MD on 6/15/2019 at 9:53 AM

## 2019-06-15 NOTE — PROGRESS NOTES
Report called to [facility] RN [facility].     Jayda Mann  6/15/2019

## 2019-06-15 NOTE — PROGRESS NOTES
regular rhythm without murmur, rub, or gallop  Abdomen:  soft, nontender, nondistended; normoactive bowel sounds; no organomegaly  Extremities:  extremities normal, atraumatic, no cyanosis or edema  Musculokeletal:  no joint swelling, no muscle tenderness. ROM normal in all joints of extremities.    Neurologic:  A&Ox3           Medications  Scheduled Meds    vancomycin  1,750 mg Intravenous Q12H    piperacillin-tazobactam  3.375 g Intravenous Q8H    enoxaparin  40 mg Subcutaneous Daily    sodium chloride flush  10 mL Intravenous 2 times per day     Infusion Meds    lactated ringers 125 mL/hr at 06/15/19 0706     PRN Meds ipratropium-albuterol, sodium phosphate IVPB **OR** sodium phosphate IVPB, sodium chloride flush, ondansetron    Laboratory Data  Recent Results (from the past 24 hour(s))   Phosphorus    Collection Time: 06/14/19  7:50 PM   Result Value Ref Range    Phosphorus 2.9 2.5 - 4.5 mg/dL   Comprehensive Metabolic Panel    Collection Time: 06/15/19  5:35 AM   Result Value Ref Range    Sodium 141 132 - 146 mmol/L    Potassium 3.9 3.5 - 5.0 mmol/L    Chloride 104 98 - 107 mmol/L    CO2 27 22 - 29 mmol/L    Anion Gap 10 7 - 16 mmol/L    Glucose 127 (H) 74 - 99 mg/dL    BUN 18 6 - 20 mg/dL    CREATININE 0.8 0.7 - 1.2 mg/dL    GFR Non-African American >60 >=60 mL/min/1.73    GFR African American >60     Calcium 8.3 (L) 8.6 - 10.2 mg/dL    Total Protein 6.2 (L) 6.4 - 8.3 g/dL    Alb 3.6 3.5 - 5.2 g/dL    Total Bilirubin 0.7 0.0 - 1.2 mg/dL    Alkaline Phosphatase 60 40 - 129 U/L    ALT 51 (H) 0 - 40 U/L    AST 57 (H) 0 - 39 U/L   CBC    Collection Time: 06/15/19  5:35 AM   Result Value Ref Range    WBC 11.0 4.5 - 11.5 E9/L    RBC 3.95 3.80 - 5.80 E12/L    Hemoglobin 12.5 12.5 - 16.5 g/dL    Hematocrit 36.8 (L) 37.0 - 54.0 %    MCV 93.2 80.0 - 99.9 fL    MCH 31.6 26.0 - 35.0 pg    MCHC 34.0 32.0 - 34.5 %    RDW 13.2 11.5 - 15.0 fL    Platelets 515 316 - 723 E9/L    MPV 10.3 7.0 - 12.0 fL   Magnesium Collection Time: 06/15/19  5:35 AM   Result Value Ref Range    Magnesium 2.3 1.6 - 2.6 mg/dL   Phosphorus    Collection Time: 06/15/19  5:35 AM   Result Value Ref Range    Phosphorus 2.3 (L) 2.5 - 4.5 mg/dL   CK    Collection Time: 06/15/19  5:35 AM   Result Value Ref Range    Total  (H) 20 - 200 U/L   [employer]    Collection Time: 06/15/19 11:17 AM   Result Value Ref Range    Vancomycin Tr 10.2 5.0 - 16.0 mcg/mL       Imaging  Ct Head Wo Contrast    Result Date: 6/13/2019  LOCATION: 200 EXAM: CT HEAD WO CONTRAST, CT CERVICAL SPINE WO CONTRAST COMPARISON: None HISTORY: Fall headache TECHNIQUE: Noncontrast helical CT images were performed of the head. Coronal and sagittal reconstructions also obtained. Automated dose control was used for this exam. CONTRAST: No intravenous contrast administered. FINDINGS: HEAD: The ventricles, sulci, and cisterns are normal in size and configuration. The brain parenchyma is normal in density. There is no evidence of intracranial hemorrhage or mass. No extra-axial fluid is seen. The paranasal sinuses are clear. The globes and orbits are normal.  No abnormalities of the calvarium are identified. 1. No acute findings. Ct Cervical Spine Wo Contrast    Result Date: 6/13/2019  Location: 200 CT CERVICAL SPINE WITHOUT IV CONTRAST WITH RECONSTRUCTION Clinical indication: status post fall, overdose No previous study for comparison. TECHNIQUE; Serial axial images were obtained through the cervical spine followed by reconstruction in sagittal and coronal planes. CT technique includes automated exposure control. FINDINGS; The vertebral bodies are normal in height. Straight alignment of the cervical spine but without significant spondylolisthesis. The vertebral bodies show no acute compression fracture or listhesis. The visualized pedicles appear unremarkable. The visualized posterior facet joints show no acute fracture or dislocation. . Mild arthritic changes of uncovertebral joints bilaterally at C6-C7 level. The visualized spinous processes appear unremarkable. The odontoid process is unremarkable with no significant rotational abnormality. The C1-C2 junction is unremarkable. The atlanto-occipital joint is intact. The prevertebral space is within normal limits. The airway is intact. The bony spinal canal is intact. Neural foraminal narrowing, moderate on the right at C3-C4 and C7-T1 levels, severe on the right and moderate on the left at C4-C5 level, mild on the right and severe on the left at C5-C6 level, and moderate bilaterally at C6-C7 level. Intervertebral disc space narrowing, mild at C5-C6 level and severe at C6-C7 and C7-T1 levels. No acute traumatic bony injury. Ct Cervical Spine Wo Contrast    Result Date: 6/13/2019  LOCATION: 200 EXAM: CT HEAD WO CONTRAST, CT CERVICAL SPINE WO CONTRAST COMPARISON: None HISTORY: Fall headache TECHNIQUE: Noncontrast helical CT images were performed of the head. Coronal and sagittal reconstructions also obtained. Automated dose control was used for this exam. CONTRAST: No intravenous contrast administered. FINDINGS: HEAD: The ventricles, sulci, and cisterns are normal in size and configuration. The brain parenchyma is normal in density. There is no evidence of intracranial hemorrhage or mass. No extra-axial fluid is seen. The paranasal sinuses are clear. The globes and orbits are normal.  No abnormalities of the calvarium are identified. 1. No acute findings. Xr Chest Portable    Result Date: 6/14/2019  Reading location: 200 Indication: Intubation, shortness of breath Comparison: Prior chest radiograph from 6/13/2019 Technique: Portable AP upright chest radiograph was obtained. Findings: An endotracheal tube tip terminates within the trachea. The cardiomediastinal silhouette is stable in size and contours. There is slight decreased pulmonary vascular congestion.  There is interval improvement of right midlung atelectasis. There is slight increased right basilar atelectasis. Costophrenic angles are clear. There is no evidence of pneumothorax. There are post-ORIF changes of the right proximal humerus. 1. Endotracheal tube tip in appropriate position. 2. Slight decreased perivascular congestion with interval improvement of right midlung atelectasis. 3. Slight increased right basilar atelectasis. Xr Chest Portable    Result Date: 6/13/2019  LOCATION: 200 CHEST Clinical indication: ET tube placement No previous study for comparison. TECHNIQUE; The portable single frontal view of the chest was obtained in supine position. FINDINGS; Interval placement of ET tube is noted with its tip about 1.9 cm above the bronchial angeles. The image shows plate atelectasis in the right midlung versus fluid along the minor fissure. No infiltrate or focal consolidation. No definite effusion. The trachea is midline. No pneumothorax. No significant focal pleural pathology. The cardiomediastinal shadows show borderline cardiomegaly with minimal pulmonary vascular congestion with no significant hilar adenopathy. 1. Interval placement of ET tube with its tip 1.9 cm above the bronchial angeles. 2. Borderline cardiomegaly with minimal pulmonary vascular congestion. 3. Plate atelectasis in the right midlung versus minimal fluid along the right minor fissure. Xr Chest Portable    Result Date: 5/16/2019  Reading location: 200 Indication: Unresponsive Comparison: Prior chest radiograph from 6/11/2017 Technique: Portable AP upright chest radiograph was obtained. Findings: The cardiomediastinal silhouette is stable in size and contours. There is minimal linear left basilar subsegmental atelectasis versus scarring. No focal consolidation. Costophrenic angles are clear. There is no evidence of pneumothorax. Minimal linear left basilar subsegmental atelectasis versus scarring.        Microbiology  Blood, urine, sputum        Assessment and Plan  Patient is a 48 y.o. male who presented with unresponsiveness. The active problem list is as follows:    · Unresponsive due to likely overdose of methadone  · Toxic encephalopathy secondary to above  · Acute hypoxemic respiratory failure requiring mechanical ventilation  · Substantial history of drug abuse with multiple prior overdoses  · Elevated CK  · Bipolar disorder with schizophrenia  · Hepatitis C        -Continue vancomycin and zosyn  - Extubated 6/14  - Continue IVF, check CK in am  - Replace phosphrous  - Review home medications when extubated, patients lithium level was negative, also benzos negative on UDS with listed benzos on home meds  - Consult psychiatry  - EVERARDO momin  - Transfer out of ICU    · Routine labs in AM.  · DVT prophylaxis. lovenox  · Please see orders for further management and care. The plan was discussed with Dr. Camila Becker    12:04 PM  6/15/2019     Greater than 30 minutes of critical care time was spent with the patient. This includes chart review, , and discussion with those consultants involved in the patient's care. I saw and evaluated the patient. I agree with the findings and the plan of care as documented in the resident's note.     Alec Gracia D.O., FACOI  4:25 PM  6/15/2019

## 2019-06-15 NOTE — PROGRESS NOTES
Pt transferred to [facility] via wheelchair, on RA, with IV, belongings (sweat pants & underwear) and chart with transport.     Airam Morales  6/15/2019

## 2019-06-15 NOTE — PROGRESS NOTES
Pharmacy Consultation Note  (Antibiotic Dosing and Monitoring)    Initial consult date: 6/13/19  Consulting physician: Dr. Dion Rhodes  Drug(s): Vancomycin  Indication: Empiric    Ht Readings from Last 1 Encounters:   06/13/19 5' 7\" (1.702 m)     Wt Readings from Last 1 Encounters:   06/15/19 174 lb 9.6 oz (79.2 kg)     Age/  Gender IBW DW  Allergy Information   48 y.o.   male 66.1 kg 86.8 kg  Patient has no known allergies. Date  Tmax WBC BUN/CR UOP  (mL/kg/hr) Drug/Dose Time   Given Level(s)   (Time) Comments   6/13  (#1) 99 13.7 27/0.8 -- Vancomycin 1500 mg IV q12hr 2306     6/14  (#2) 99 6.9 18/0.9 1500 mL Vancomycin 1500 mg IV q12hr 1116  2305     6/15  (#3)  11 18/0.8 1 Vancomycin 1750 mg IV q12hr <1230> Trough @ 1117 = 10.2 mcg/mL      (#4)             (#5)             (#6)             (#7)             Estimated Creatinine Clearance: 100 mL/min (based on SCr of 0.8 mg/dL). UOP over the past 24 hours:     Intake/Output Summary (Last 24 hours) at 6/15/2019 0738  Last data filed at 6/15/2019 0300  Gross per 24 hour   Intake 3602.42 ml   Output 1825 ml   Net 1777.42 ml     Other anti-infectives: Anti-infective Dose Date Initiated Date Stopped   Pip/tazo 3.375g IV q8hr 6/13      Cultures:  available culture and sensitivity results were reviewed in EPIC  Cultures sent and are pending. Culture Date Result    Urine cx 6/13 NGTD   MRSA nares 6/13 Negative   Blood cx 1 6/14    Blood cx 2 6/14    Respiratory cx       Assessment:  · Consulted by Dr. Dion Rhodes to dose/monitor vancomycin  · Goal trough level:  15-20 mcg/mL  · Pt is a 48 yoF who presented to the ICU intubated with suspected methadone overdose. He was initiated on vancomycin/zosyn for pneumonia. · Serum creatinine today: 0.8 mg/dL; CrCl ~ 100 mL/min; baseline Scr ~ 0.8 mg/mL    Plan:  · Vancomycin 1500 mg IV q12hr  · Trough today @ 1030.  Hold dose for level > 20 mcg/mL  · Follow renal function  · Pharmacist will follow and monitor/adjust dosing as

## 2019-06-16 ENCOUNTER — APPOINTMENT (OUTPATIENT)
Dept: ULTRASOUND IMAGING | Age: 54
DRG: 812 | End: 2019-06-16
Payer: COMMERCIAL

## 2019-06-16 LAB
ALBUMIN SERPL-MCNC: 3.7 G/DL (ref 3.5–5.2)
ALP BLD-CCNC: 63 U/L (ref 40–129)
ALT SERPL-CCNC: 207 U/L (ref 0–40)
ANION GAP SERPL CALCULATED.3IONS-SCNC: 12 MMOL/L (ref 7–16)
AST SERPL-CCNC: 192 U/L (ref 0–39)
BILIRUB SERPL-MCNC: 0.9 MG/DL (ref 0–1.2)
BUN BLDV-MCNC: 19 MG/DL (ref 6–20)
CALCIUM SERPL-MCNC: 8.7 MG/DL (ref 8.6–10.2)
CHLORIDE BLD-SCNC: 102 MMOL/L (ref 98–107)
CO2: 26 MMOL/L (ref 22–29)
CREAT SERPL-MCNC: 0.9 MG/DL (ref 0.7–1.2)
GFR AFRICAN AMERICAN: >60
GFR NON-AFRICAN AMERICAN: >60 ML/MIN/1.73
GLUCOSE BLD-MCNC: 70 MG/DL (ref 74–99)
HCT VFR BLD CALC: 40.1 % (ref 37–54)
HEMOGLOBIN: 13.4 G/DL (ref 12.5–16.5)
MCH RBC QN AUTO: 31.5 PG (ref 26–35)
MCHC RBC AUTO-ENTMCNC: 33.4 % (ref 32–34.5)
MCV RBC AUTO: 94.1 FL (ref 80–99.9)
PDW BLD-RTO: 13.3 FL (ref 11.5–15)
PLATELET # BLD: 183 E9/L (ref 130–450)
PMV BLD AUTO: 9.8 FL (ref 7–12)
POTASSIUM REFLEX MAGNESIUM: 3.8 MMOL/L (ref 3.5–5)
RBC # BLD: 4.26 E12/L (ref 3.8–5.8)
SODIUM BLD-SCNC: 140 MMOL/L (ref 132–146)
TOTAL CK: 182 U/L (ref 20–200)
TOTAL PROTEIN: 6.6 G/DL (ref 6.4–8.3)
WBC # BLD: 5.7 E9/L (ref 4.5–11.5)

## 2019-06-16 PROCEDURE — 80202 ASSAY OF VANCOMYCIN: CPT

## 2019-06-16 PROCEDURE — 82550 ASSAY OF CK (CPK): CPT

## 2019-06-16 PROCEDURE — 6370000000 HC RX 637 (ALT 250 FOR IP): Performed by: INTERNAL MEDICINE

## 2019-06-16 PROCEDURE — 6360000002 HC RX W HCPCS: Performed by: INTERNAL MEDICINE

## 2019-06-16 PROCEDURE — 2580000003 HC RX 258: Performed by: INTERNAL MEDICINE

## 2019-06-16 PROCEDURE — 80053 COMPREHEN METABOLIC PANEL: CPT

## 2019-06-16 PROCEDURE — 51798 US URINE CAPACITY MEASURE: CPT

## 2019-06-16 PROCEDURE — 1200000000 HC SEMI PRIVATE

## 2019-06-16 PROCEDURE — 76705 ECHO EXAM OF ABDOMEN: CPT

## 2019-06-16 PROCEDURE — 85027 COMPLETE CBC AUTOMATED: CPT

## 2019-06-16 PROCEDURE — 80074 ACUTE HEPATITIS PANEL: CPT

## 2019-06-16 PROCEDURE — 97530 THERAPEUTIC ACTIVITIES: CPT | Performed by: PHYSICAL THERAPIST

## 2019-06-16 PROCEDURE — 97161 PT EVAL LOW COMPLEX 20 MIN: CPT | Performed by: PHYSICAL THERAPIST

## 2019-06-16 PROCEDURE — 36415 COLL VENOUS BLD VENIPUNCTURE: CPT

## 2019-06-16 RX ORDER — LEVOFLOXACIN 750 MG/1
750 TABLET ORAL DAILY
Status: DISCONTINUED | OUTPATIENT
Start: 2019-06-16 | End: 2019-06-18 | Stop reason: HOSPADM

## 2019-06-16 RX ORDER — IBUPROFEN 600 MG/1
600 TABLET ORAL EVERY 6 HOURS PRN
Status: DISCONTINUED | OUTPATIENT
Start: 2019-06-16 | End: 2019-06-18 | Stop reason: HOSPADM

## 2019-06-16 RX ADMIN — LEVOFLOXACIN 750 MG: 750 TABLET, FILM COATED ORAL at 16:14

## 2019-06-16 RX ADMIN — IBUPROFEN 600 MG: 600 TABLET, FILM COATED ORAL at 22:43

## 2019-06-16 RX ADMIN — VANCOMYCIN HYDROCHLORIDE 1750 MG: 10 INJECTION, POWDER, LYOPHILIZED, FOR SOLUTION INTRAVENOUS at 12:38

## 2019-06-16 RX ADMIN — ENOXAPARIN SODIUM 40 MG: 40 INJECTION SUBCUTANEOUS at 11:47

## 2019-06-16 RX ADMIN — DOCUSATE SODIUM 100 MG: 100 CAPSULE, LIQUID FILLED ORAL at 11:47

## 2019-06-16 RX ADMIN — PIPERACILLIN AND TAZOBACTAM 3.38 G: 3; .375 INJECTION, POWDER, FOR SOLUTION INTRAVENOUS at 05:33

## 2019-06-16 RX ADMIN — VANCOMYCIN HYDROCHLORIDE 1750 MG: 10 INJECTION, POWDER, LYOPHILIZED, FOR SOLUTION INTRAVENOUS at 00:56

## 2019-06-16 RX ADMIN — PIPERACILLIN AND TAZOBACTAM 3.38 G: 3; .375 INJECTION, POWDER, FOR SOLUTION INTRAVENOUS at 14:27

## 2019-06-16 ASSESSMENT — PAIN - FUNCTIONAL ASSESSMENT
PAIN_FUNCTIONAL_ASSESSMENT: ACTIVITIES ARE NOT PREVENTED
PAIN_FUNCTIONAL_ASSESSMENT: ACTIVITIES ARE NOT PREVENTED

## 2019-06-16 ASSESSMENT — PAIN DESCRIPTION - ONSET
ONSET: ON-GOING
ONSET: ON-GOING

## 2019-06-16 ASSESSMENT — PAIN SCALES - GENERAL
PAINLEVEL_OUTOF10: 6
PAINLEVEL_OUTOF10: 0
PAINLEVEL_OUTOF10: 8

## 2019-06-16 ASSESSMENT — PAIN DESCRIPTION - DESCRIPTORS
DESCRIPTORS: ACHING;DISCOMFORT;DULL
DESCRIPTORS: ACHING;DISCOMFORT

## 2019-06-16 ASSESSMENT — PAIN DESCRIPTION - ORIENTATION: ORIENTATION: MID

## 2019-06-16 ASSESSMENT — PAIN DESCRIPTION - PAIN TYPE
TYPE: ACUTE PAIN
TYPE: ACUTE PAIN

## 2019-06-16 ASSESSMENT — PAIN DESCRIPTION - PROGRESSION
CLINICAL_PROGRESSION: GRADUALLY WORSENING
CLINICAL_PROGRESSION: GRADUALLY WORSENING

## 2019-06-16 ASSESSMENT — PAIN DESCRIPTION - FREQUENCY
FREQUENCY: CONTINUOUS
FREQUENCY: CONTINUOUS

## 2019-06-16 ASSESSMENT — PAIN DESCRIPTION - LOCATION
LOCATION: ABDOMEN
LOCATION: ABDOMEN

## 2019-06-16 NOTE — PLAN OF CARE
Problem: Falls - Risk of:  Goal: Will remain free from falls  Description  Will remain free from falls  6/16/2019 1317 by Gladys Harrell RN  Outcome: Met This Shift     Problem: Falls - Risk of:  Goal: Absence of physical injury  Description  Absence of physical injury  6/16/2019 1317 by Gladys Harrell RN  Outcome: Met This Shift     Problem: Risk for Impaired Skin Integrity  Goal: Tissue integrity - skin and mucous membranes  Description  Structural intactness and normal physiological function of skin and  mucous membranes.   6/16/2019 1317 by Gladys Harrell RN  Outcome: Met This Shift     Problem: Pain:  Goal: Pain level will decrease  Description  Pain level will decrease  Outcome: Met This Shift     Problem: Pain:  Goal: Control of acute pain  Description  Control of acute pain  Outcome: Met This Shift

## 2019-06-16 NOTE — PROGRESS NOTES
Physical Therapy      Room #:   0315/0315-01  Patient Name: Donny Govea    PHYSICAL THERAPY INITIAL EVALUATION    Tentative placement recommendation: Home    Equipment recommendation: None      Plan of care: Patient will be seen   3-5  week  for therapeutic exercise, functional retraining, endurance activities, balance exercises, family and patient education. Nursing to ambulate patient in hallway; order entered. AM-PAC Basic Mobility        AM-[facility] Mobility Inpatient   How much difficulty turning over in bed?: None  How much difficulty sitting down on / standing up from a chair with arms?: None  How much difficulty moving from lying on back to sitting on side of bed?: None  How much help from another person moving to and from a bed to a chair?: None  How much help from another person needed to walk in hospital room?: A Little  How much help from another person for climbing 3-5 steps with a railing?: A Little  AM-[facility] Inpatient Mobility Raw Score : 22  AM-PAC Inpatient T-Scale Score : 53.28  Mobility Inpatient CMS 0-100% Score: 20.91  Mobility Inpatient CMS G-Code Modifier : CJ    Order:  EVALUATE AND TREAT    Diagnosis/Problem list:    1. Acute respiratory failure, unspecified whether with hypoxia or hypercapnia ([address]    2. Acute encephalopathy    3. Traumatic rhabdomyolysis, initial encounter ([address]    4. Polysubstance abuse ([address]    5. Respiratory alkalosis    6.  Trauma        Patient Active Problem List   Diagnosis    Intentional drug overdose ([address]    Bipolar 1 disorder ([address]    Schizophrenia ([address]    Mood disorder ([address]    Aspiration pneumonia of both lungs due to gastric secretions ([address]    Respiratory failure ([address]    Drug overdose, accidental or unintentional, initial encounter    Drug overdose       Past medical history:       Diagnosis Date    Arthritis     Aspiration pneumonia of both lungs due to gastric secretions ([address] 6/10/2017    Bipolar 1 disorder ([address]     Hepatitis C     [facility]ia [facility]    ; Procedure Laterality Date    COLONOSCOPY      ENDOSCOPY, COLON, DIAGNOSTIC      EYE SURGERY      FRACTURE SURGERY      JOINT REPLACEMENT      left knee    ORBITAL FRACTURE SURGERY      ROTATOR CUFF REPAIR       Intubated 6/13-6/14  The admitting diagnosis and active problem list as listed above have been reviewed prior to the initiation of this evaluation. Last time out of bed: prior to admit    Precautions: falls ,   Social history: Patient lives ? Prior Level of Function: Patient ambulated independently    Equipment owned: None,       Mentation: alert, cooperative, oriented x 3  and follows directions, distractible    ROM: wfl    STRENGTH: wfl    PAIN: (measured on a visual analog scale with 0=no pain and 10=excruciating pain) 4/10. Back pain     FUNCTIONAL ASSESSMENT   Bed Mobility- Supine to sit- Independent          Scooting- Independent       Sit to supine- Independent      Transfers-Sit to stand- Independent     Gait:  Patient ambulated 250 feet x 2 reps  independent    Steps:  5 steps  independent     Balance: sit-good         stand good       Edema: no  Endurance: good       Treatment:    Therapist educated and facilitated patient on techniques to increase safety and independence with bed mobility, balance, functional transfers, and functional mobility. Sat EOB x 5 minutes to increase dynamic sitting balance and activity tolerance. Patient demonstrating fair   understanding of education/techniques, requiring additional training/education. At end of session, patient sitting edge of bed with   call light and phone within reach, alarm all lines and tubes intact, nursing notified. Pt would benefit from continued skilled PT to increase functional independence and quality of life. Rehab Potential: good  for baseline  Patients Goal: home      ASSESSMENT  Patient exhibits decreased strength, balance, coordination impairing functional mobility.     GOALS to be met in 1 days. Steps: Patient to go up and down 7  step(s) using 1 rail(s) with  Independent         Increase strength in affected mm groups by 1/3 grade  Increase balance to allow for improvement towards functional goals. Increase endurance to allow for improvement towards functional goals.         Linda Augustin, PT

## 2019-06-16 NOTE — PROGRESS NOTES
Internal Medicine Progress Note    Ileene Nine. Forest Wilson., & [address] [facility] [address] Forest Wilson., F.A.C.O.I. Ezequiel Mondragon D.O., F.A.C.O.I. Primary Care Physician: Malcom Burdick DO   Admitting Physician:  Subha Mauricio DO  Admission date and time: 6/13/2019  4:10 PM    Room:  [address] [place] [address]  Admitting diagnosis: Respiratory failure ([address] [J96.90]  Drug overdose, accidental or unintentional, initial encounter [T50.901A]    Patient Name: Jovanna Saucedo  MRN: [id number]    Date of Service: 6/16/2019     Subjective:    Jerri Billingsley is a 48 y. o.  male who was seen and examined today,6/16/2019, at the bedside. Complains of fatigue and abdominal pain. Intermittent cough, no shortness of breath or chest pain. No family present during my examination. Review of System:  Bolded are positive  Constitutional:   Fever and chills. Weight loss. Weight gain. Fatigue. Malaise. HEENT:   Ear pain. Sore throat. Rhinorrhea. Sinus pressure. Eye pain. Discharge. Redness. Psch:   Depressed. Anxious. Cardiovascular:   Chest pain. Irregular heartbeats. Palpitations. Dyspnea on exertion. Orthopnea. Respiratory:   Shortness of breath. Coughing. Sputum production. Hemoptysis. Wheezing. Gastrointestinal:   Nausea. Vomiting. Diarrhea. Constipation. Mild abdominal pain/cramping. Extremities:   Lower extremity swelling. Edema. Cyanosis. Neurology:    Headache. Focal neurological deficits. Weakness. Paresthesia. Loss of Consciousness. Numbness. Memory difficulty. Integumentary:   Rashes. Ulcers. Excoriations. Bruising. Multiple tattoos. Genitourinary:    Urgency. Frequency. Hematuria. Voiding difficulty. Hematologic/Lymphatic:  Lymph node. Night sweats. Musculoskeletal:    Gait disturbance. Myalgias. Joint complaints. Back Pain. Physical Exam:  No intake/output data recorded.     Intake/Output Summary (Last 24 hours) at 6/16/2019 1117  Last data filed at 6/15/2019 1753  Gross per 24 hour Intake 1863 ml   Output 1350 ml   Net 513 ml   I/O last 3 completed shifts: In: 1923 [P.O.:60; I.V.:1313; IV Piggyback:550]  Out: 4331 [EEUNI:3550]  Patient Vitals for the past 96 hrs (Last 3 readings):   Weight   06/15/19 0600 174 lb 9.6 oz (79.2 kg)   06/14/19 2000 177 lb (80.3 kg)   06/13/19 1625 191 lb 5 oz (86.8 kg)       Vital Signs:   Blood pressure 124/82, pulse 60, temperature 98.7 °F (37.1 °C), temperature source Oral, resp. rate 20, height 5' 7\" (1.702 m), weight 174 lb 9.6 oz (79.2 kg), SpO2 96 %. Amrita Germain is a 48 y. o.  male who is alert, responsive, oriented to person, place, and time. General appearance:   Well preserved, mesomorphic body habitus, alert, no distress. Head:  Normocephalic. No masses, lesions or tenderness. Eyes:  PERRLA. EOMI. Sclera clear. Buccal mucosa moist.  ENT:  Ears normal. Mucosa normal.  Neck:    Supple. Trachea midline. No thyromegaly. No JVD. No bruits. Heart:    Rhythm regular. Rate controlled. No murmurs. Lungs:    Symmetrical. Clear to auscultation bilaterally. No wheezes. No rhonchi. No rales. Abdomen:   Soft. Non-distended. Bowel sounds positive. No organomegaly or masses. Mild tenderness lower abdomen. Extremities:    Peripheral pulses present. Trace lower leg edema. No ulcers. No cyanosis. No clubbing. Neurologic:    Alert x 3. No focal deficit. Cranial nerves grossly intact. No focal weakness. Psych:   Behavior is normal. Mood appears normal. Speech is not rapid and/or pressured. Musculoskeletal:   Spine ROM normal. Muscular strength intact. Gait not assessed. Skin:    No rashes  Skin normal color and texture.   Multiple tattoos   Genitalia/Breast:  Deferred        Allergy:  No Known Allergies     Medication:  Scheduled Meds:   vancomycin  1,750 mg Intravenous Q12H    docusate sodium  100 mg Oral Daily    piperacillin-tazobactam  3.375 g Intravenous Q8H    enoxaparin  40 mg Subcutaneous Daily    sodium chloride flush  10 mL There is no evidence of intracranial hemorrhage or mass. No extra-axial fluid is seen. The paranasal sinuses are clear. The globes and orbits are normal.  No abnormalities of the calvarium are identified. 1. No acute findings. Ct Cervical Spine Wo Contrast    Result Date: 6/13/2019  Location: 200 CT CERVICAL SPINE WITHOUT IV CONTRAST WITH RECONSTRUCTION Clinical indication: status post fall, overdose No previous study for comparison. TECHNIQUE; Serial axial images were obtained through the cervical spine followed by reconstruction in sagittal and coronal planes. CT technique includes automated exposure control. FINDINGS; The vertebral bodies are normal in height. Straight alignment of the cervical spine but without significant spondylolisthesis. The vertebral bodies show no acute compression fracture or listhesis. The visualized pedicles appear unremarkable. The visualized posterior facet joints show no acute fracture or dislocation. . Mild arthritic changes of uncovertebral joints bilaterally at C6-C7 level. The visualized spinous processes appear unremarkable. The odontoid process is unremarkable with no significant rotational abnormality. The C1-C2 junction is unremarkable. The atlanto-occipital joint is intact. The prevertebral space is within normal limits. The airway is intact. The bony spinal canal is intact. Neural foraminal narrowing, moderate on the right at C3-C4 and C7-T1 levels, severe on the right and moderate on the left at C4-C5 level, mild on the right and severe on the left at C5-C6 level, and moderate bilaterally at C6-C7 level. Intervertebral disc space narrowing, mild at C5-C6 level and severe at C6-C7 and C7-T1 levels. No acute traumatic bony injury.      Ct Cervical Spine Wo Contrast    Result Date: 6/13/2019  LOCATION: 200 EXAM: CT HEAD WO CONTRAST, CT CERVICAL SPINE WO CONTRAST COMPARISON: None HISTORY: Fall headache TECHNIQUE: Noncontrast helical CT images were performed of the head. Coronal and sagittal reconstructions also obtained. Automated dose control was used for this exam. CONTRAST: No intravenous contrast administered. FINDINGS: HEAD: The ventricles, sulci, and cisterns are normal in size and configuration. The brain parenchyma is normal in density. There is no evidence of intracranial hemorrhage or mass. No extra-axial fluid is seen. The paranasal sinuses are clear. The globes and orbits are normal.  No abnormalities of the calvarium are identified. 1. No acute findings. Xr Chest Portable    Result Date: 6/14/2019  Reading location: 200 Indication: Intubation, shortness of breath Comparison: Prior chest radiograph from 6/13/2019 Technique: Portable AP upright chest radiograph was obtained. Findings: An endotracheal tube tip terminates within the trachea. The cardiomediastinal silhouette is stable in size and contours. There is slight decreased pulmonary vascular congestion. There is interval improvement of right midlung atelectasis. There is slight increased right basilar atelectasis. Costophrenic angles are clear. There is no evidence of pneumothorax. There are post-ORIF changes of the right proximal humerus. 1. Endotracheal tube tip in appropriate position. 2. Slight decreased perivascular congestion with interval improvement of right midlung atelectasis. 3. Slight increased right basilar atelectasis. Xr Chest Portable    Result Date: 6/13/2019  LOCATION: 200 CHEST Clinical indication: ET tube placement No previous study for comparison. TECHNIQUE; The portable single frontal view of the chest was obtained in supine position. FINDINGS; Interval placement of ET tube is noted with its tip about 1.9 cm above the bronchial angeles. The image shows plate atelectasis in the right midlung versus fluid along the minor fissure. No infiltrate or focal consolidation. No definite effusion. The trachea is midline. No pneumothorax. No significant focal pleural pathology. was spent reviewing notes and laboratory data, instructing and counseling the patient. Time I spent with the family or surrogate(s) is included only if the patient was incapable of providing the necessary information or participating in medical decisionsI also discussed the differential diagnosis and all of the proposed management plans with the patient and individuals accompanying the patient. PI reviewed the patient's past medical, surgical history and medication. Please see orders for further plan of care. Rhythm strips reviewed as well as consultant recommendations/notes and/or discussion. Patient's medications were reviewed/continued/adjusted. Labs as ordered. Please see orders for further plan of care. I reviewed the  course of events since last visit. Danie Cordova DO, F.A.C.O.I.   On 6/16/2019  11:17 AM

## 2019-06-16 NOTE — PROGRESS NOTES
Pharmacy Consultation Note  (Antibiotic Dosing and Monitoring)    Initial consult date: 6/13/19  Consulting physician: Dr. Florentin Gunter  Drug(s): Vancomycin  Indication: Empiric    Ht Readings from Last 1 Encounters:   06/13/19 5' 7\" (1.702 m)     Wt Readings from Last 1 Encounters:   06/15/19 174 lb 9.6 oz (79.2 kg)     Age/  Gender IBW DW  Allergy Information   48 y.o.   male 66.1 kg 86.8 kg  Patient has no known allergies. Date  Tmax WBC BUN/CR UOP  (mL/kg/hr) Drug/Dose Time   Given Level(s)   (Time) Comments   6/13  (#1) 99 13.7 27/0.8 -- Vancomycin 1500 mg IV q12hr 2306     6/14  (#2) 99 6.9 18/0.9 1500 mL Vancomycin 1500 mg IV q12hr 1116  2305     6/15  (#3) -- 11 18/0.8 1 Vancomycin 1750 mg IV q12hr 1244 Trough @ 1117 = 10.2 mcg/mL    6/16  (#4) 98.7 -- --  Vancomycin 1750 mg IV q12hr 0056  <1230>     6/17  (#5)      <0030> <0000> Hold dose if trough is >20 mcg/mL     (#6)             (#7)             Estimated Creatinine Clearance: 100 mL/min (based on SCr of 0.8 mg/dL). UOP over the past 24 hours:     Intake/Output Summary (Last 24 hours) at 6/16/2019 1135  Last data filed at 6/15/2019 1753  Gross per 24 hour   Intake 1863 ml   Output 1350 ml   Net 513 ml     Other anti-infectives: Anti-infective Dose Date Initiated Date Stopped   Pip/tazo 3.375g IV q8hr 6/13      Cultures:  available culture and sensitivity results were reviewed in EPIC  Cultures sent and are pending. Culture Date Result    Urine cx 6/13 NGTD   MRSA nares 6/13 Negative   Blood cx 1 6/14 NGTD   Blood cx 2 6/14 NGTD   Respiratory cx       Assessment:  · Consulted by Dr. Florentin Gunter to dose/monitor vancomycin  · Goal trough level:  15-20 mcg/mL  · Pt is a 48 yoF who presented to the ICU intubated with suspected methadone overdose. He was initiated on vancomycin/zosyn for pneumonia.    · Serum creatinine yesterday: 0.8 mg/dL; CrCl ~ 100 mL/min; baseline Scr ~ 0.8 mg/mL  · 6/16: trough yesterday = 10.2 mcg/mL, dose adjusted    Plan:  · Vancomycin 1,750 mg IV Q12H  · Trough tonight @ 0000, hold dose if trough is >20 mcg/mL  · Follow renal function  · Pharmacist will follow and monitor/adjust dosing as necessary      Thank you for the consult,    Cely FrancoD, BCPS 6/16/2019 11:41 AM   [phone]

## 2019-06-16 NOTE — PLAN OF CARE
Problem: Anxiety:  Goal: Level of anxiety will decrease  Description  Level of anxiety will decrease  Outcome: Met This Shift  Note:   Anxiety level will remain in control during shift

## 2019-06-17 LAB
ALBUMIN SERPL-MCNC: 3.6 G/DL (ref 3.5–5.2)
ALP BLD-CCNC: 68 U/L (ref 40–129)
ALT SERPL-CCNC: 248 U/L (ref 0–40)
ANION GAP SERPL CALCULATED.3IONS-SCNC: 12 MMOL/L (ref 7–16)
AST SERPL-CCNC: 200 U/L (ref 0–39)
BILIRUB SERPL-MCNC: 1 MG/DL (ref 0–1.2)
BUN BLDV-MCNC: 18 MG/DL (ref 6–20)
CALCIUM SERPL-MCNC: 8.4 MG/DL (ref 8.6–10.2)
CHLORIDE BLD-SCNC: 105 MMOL/L (ref 98–107)
CO2: 24 MMOL/L (ref 22–29)
CREAT SERPL-MCNC: 0.8 MG/DL (ref 0.7–1.2)
GFR AFRICAN AMERICAN: >60
GFR NON-AFRICAN AMERICAN: >60 ML/MIN/1.73
GLUCOSE BLD-MCNC: 98 MG/DL (ref 74–99)
HAV IGM SER IA-ACNC: ABNORMAL
HCT VFR BLD CALC: 41.3 % (ref 37–54)
HEMOGLOBIN: 14.1 G/DL (ref 12.5–16.5)
HEPATITIS B CORE IGM ANTIBODY: ABNORMAL
HEPATITIS B SURFACE ANTIGEN INTERPRETATION: ABNORMAL
HEPATITIS C ANTIBODY INTERPRETATION: REACTIVE
HIV-1 AND HIV-2 ANTIBODIES: NORMAL
MCH RBC QN AUTO: 31.6 PG (ref 26–35)
MCHC RBC AUTO-ENTMCNC: 34.1 % (ref 32–34.5)
MCV RBC AUTO: 92.6 FL (ref 80–99.9)
PDW BLD-RTO: 13.1 FL (ref 11.5–15)
PLATELET # BLD: 169 E9/L (ref 130–450)
PMV BLD AUTO: 10.1 FL (ref 7–12)
POTASSIUM SERPL-SCNC: 3.6 MMOL/L (ref 3.5–5)
RBC # BLD: 4.46 E12/L (ref 3.8–5.8)
SODIUM BLD-SCNC: 141 MMOL/L (ref 132–146)
TOTAL PROTEIN: 6.8 G/DL (ref 6.4–8.3)
VANCOMYCIN TROUGH: 15.8 MCG/ML (ref 5–16)
WBC # BLD: 4.4 E9/L (ref 4.5–11.5)

## 2019-06-17 PROCEDURE — 97530 THERAPEUTIC ACTIVITIES: CPT

## 2019-06-17 PROCEDURE — 1200000000 HC SEMI PRIVATE

## 2019-06-17 PROCEDURE — 85027 COMPLETE CBC AUTOMATED: CPT

## 2019-06-17 PROCEDURE — 80053 COMPREHEN METABOLIC PANEL: CPT

## 2019-06-17 PROCEDURE — 6360000002 HC RX W HCPCS: Performed by: INTERNAL MEDICINE

## 2019-06-17 PROCEDURE — 97116 GAIT TRAINING THERAPY: CPT

## 2019-06-17 PROCEDURE — 6370000000 HC RX 637 (ALT 250 FOR IP): Performed by: INTERNAL MEDICINE

## 2019-06-17 PROCEDURE — 36415 COLL VENOUS BLD VENIPUNCTURE: CPT

## 2019-06-17 PROCEDURE — 97165 OT EVAL LOW COMPLEX 30 MIN: CPT

## 2019-06-17 PROCEDURE — 2580000003 HC RX 258: Performed by: INTERNAL MEDICINE

## 2019-06-17 RX ADMIN — Medication 10 ML: at 21:42

## 2019-06-17 RX ADMIN — ENOXAPARIN SODIUM 40 MG: 40 INJECTION SUBCUTANEOUS at 09:10

## 2019-06-17 RX ADMIN — LEVOFLOXACIN 750 MG: 750 TABLET, FILM COATED ORAL at 09:10

## 2019-06-17 RX ADMIN — DOCUSATE SODIUM 100 MG: 100 CAPSULE, LIQUID FILLED ORAL at 09:10

## 2019-06-17 ASSESSMENT — PAIN SCALES - GENERAL
PAINLEVEL_OUTOF10: 0

## 2019-06-17 NOTE — PROGRESS NOTES
Pharmacy Consultation Note  (Antibiotic Dosing and Monitoring)    Vancomycin has been discontinued; pharmacy will sign-off. Please reconsult if needed.     Thank you,  Tonia Newman PharmD, BCPS 6/17/2019 8:36 AM

## 2019-06-17 NOTE — PROGRESS NOTES
Physical Therapy  Physical Therapy  Daily Treatment Note  6/17/2019  0315/0315-01                      Adele Hester   [id number]                              1965    Patient Active Problem List   Diagnosis    [facility] ([facility]    Bipolar 1 disorder ([facility]    Schizophrenia ([facility]    Mood disorder ([facility]    Aspiration pneumonia of both lungs due to gastric secretions ([facility]    Respiratory failure ([facility]    Drug overdose, accidental or unintentional, initial encounter    Drug overdose       Recommendation for discharge: Home  Equipment prescriptions needed:none    AM-[facility] Mobility Inpatient   How much difficulty turning over in bed?: None  How much difficulty sitting down on / standing up from a chair with arms?: None  How much difficulty moving from lying on back to sitting on side of bed?: None  How much help from another person moving to and from a bed to a chair?: None  How much help from another person needed to walk in hospital room?: None  How much help from another person for climbing 3-5 steps with a railing?: None  AM-PAC Inpatient Mobility Raw Score : 24  AM-PAC Inpatient T-Scale Score : 61.14  Mobility Inpatient CMS 0-100% Score: 0  Mobility Inpatient CMS G-Code Modifier : CH      Precautions: falls    S: Patient cleared by nursing for treatment. Patient is agreeable to treatment. Pain status: (measured on a visual analog scale with 0=no pain and 10=excruciating pain) 0/10. O: Pt was instructed in and performed the following:   Bed Mobility- Supine to sit-Independent     Scooting- Independent     Sit to supine-Independent   Transfers-sit to stand- Independent     Gait:  Patient ambulated 120 feet x 2 using no AD with Independent. Steps: Pt ascended/descended 10 steps using no hand rail with Independent. Treatment: Pt transferred to edge of bed, stood, ambulated in the hallway, performed stair training, and ambulated back to bed. .  Comment: Call light left by patient.  RTB at end of tx session. A: Pt able to perform steps with no LOB and all functional mobility was Independent. P: Discharge from physical therapy due to meeting all goals. ODALIS PARRA, PTA   GOALS to be met in 1 days. Steps: Patient to go up and down 7  step(s) using 1 rail(s) with  Independent          Increase strength in affected mm groups by 1/3 grade  Increase balance to allow for improvement towards functional goals.   Increase endurance to allow for improvement towards functional goals.

## 2019-06-17 NOTE — PROGRESS NOTES
Occupational Therapy  Occupational Therapy Initial Assessment    Date:2019  Patient Name: Tamra Chinchilla  MRN: [id number]  : 1965  ROOM #: 0315/0315-01    Placement Recommendation: Home with no skilled occupational therapy needed after discharge from inpatient. Equipment Prescriptions Needed: none     [facility]   AM-PAC Daily Activity Inpatient   How much help for putting on and taking off regular lower body clothing?: None  How much help for Bathing?: None  How much help for Toileting?: None  How much help for putting on and taking off regular upper body clothing?: None  How much help for taking care of personal grooming?: None  How much help for eating meals?: None  AM-PAC Inpatient Daily Activity Raw Score: 24  AM-PAC Inpatient ADL T-Scale Score : 57.54  ADL Inpatient CMS 0-100% Score: 0  ADL Inpatient CMS G-Code Modifier : CH    Diagnosis:   1. Acute respiratory failure, unspecified whether with hypoxia or hypercapnia ([facility]    2. Acute encephalopathy    3. Traumatic rhabdomyolysis, initial encounter ([facility]    4. Polysubstance abuse ([address]    5. Respiratory alkalosis    6. Trauma      Past Medical History:   Past Medical History:   Diagnosis Date    Arthritis     Aspiration pneumonia of both lungs due to gastric secretions ([address] 6/10/2017    Bipolar 1 disorder ([address]     Hepatitis C     Schizophrenia ([facility]          The admitting diagnosis and active problem list as listed above have been reviewed prior to the initiation of this evaluation. Nursing cleared the patient for evaluation. Patient is agreeable to evaluation. Precautions: falls  Pain Scale: Numeric Rate: 2/10 in stomach and flank area; Nursing notified. Social history: alone       Drive: yes    Occupation: retired, construction    Home architecture: single family home, 1 story, 3 steps to enter with rail, walk in shower.    PLOF: independent with BADL and IADL, ambulated with no device   Equipment owned: none   Cognition: oriented x 4; follows 3 step directions. good  Problem solving skills   good  Memory    good  Sequencing  Communication: intact   Visual perceptual skills: intact     Glasses: yes, readers    Edema: no     Sensation: intact   Hand Dominance:  Left     X  Right     Left Right Comment   Passive range of motion [facility] WFL     Active range of motion [facility]     Muscle Grade 5/5 5/5    /pinch Strength Intact  Intact     [] Malnutrition indicators have been identified and nursing has been notified to ensure a dietitian consult is ordered. Function Assessment    Status  Goal Comment   Feeding:  Independent   Independent      Grooming:  Independent  Independent      UE dressing:  Independent  Independent     LE dressing:  Independent  Independent     Bathing:  Independent  Independent     Bed Mobility:     Independent  for supine to sit,   Independent  for scooting,  Independent for sit to supine  Independent     Functional Transfers:    Independent  for sit to stand transfer from no device Independent  Safety: good    Functional Mobility: 120 feet with no device and  Independent  Independent       Pt/family participated in establishment of goals. Balance:   Sitting: good   Standing: good with no device    Endurance: good       Assessment of Current Deficits: none   []Functional mobility  []ROM  []Strength   []Cognition   []Safety Awareness    []ADLs []IADLs   []Endurance  []Balance    []Vision  []Sensation     []Gross Motor Coordination       []Fine Motor Coordination     · Low Complexity  · History: Brief history including review of medical records relating to the problem  · Exam: 1-3 performance Deficits  · Assistance/Modification: No assistance or modifications required to perform tasks. No comorbities affecting occupational performance.      Patients Goal: return home   Treatment: OT eval, bed mobility, sitting balance at EOB for 5 minutes with no assist, transfer training with verbal cues for hand placement, static standing balance with no device, functional mobility with no device in hallway, verbal cues to use grab bar for safe commode transfer, pt returned to bed at end of eval. All needs within reach. Pt has no questions or concerns regarding ADL's and returning home. Rehab Potential: good   Plan of care: No skilled needs identified at this time; will discharge from OT services. If condition changes, please reorder Occupational Therapy. Thank you. Patient and/or family understands diagnosis, prognosis, education and plan of care. Pt/family verbalized understanding.      Time Code treatment minutes: [address] OTR/L #[id number]

## 2019-06-17 NOTE — PLAN OF CARE
Problem: Falls - Risk of:  Goal: Will remain free from falls  Description  Will remain free from falls  Outcome: Met This Shift     Problem: Falls - Risk of:  Goal: Absence of physical injury  Description  Absence of physical injury  Outcome: Met This Shift     Problem: Risk for Impaired Skin Integrity  Goal: Tissue integrity - skin and mucous membranes  Description  Structural intactness and normal physiological function of skin and  mucous membranes.   Outcome: Met This Shift     Problem: Pain:  Goal: Pain level will decrease  Description  Pain level will decrease  Outcome: Met This Shift     Problem: Pain:  Goal: Control of acute pain  Description  Control of acute pain  Outcome: Met This Shift     Problem: Pain:  Goal: Control of chronic pain  Description  Control of chronic pain  Outcome: Met This Shift     Problem: Pain:  Goal: Control of chronic pain  Description  Control of chronic pain  Outcome: Met This Shift     Problem: Anxiety:  Goal: Level of anxiety will decrease  Description  Level of anxiety will decrease  6/17/2019 0751 by Gio Nguyen RN  Outcome: Met This Shift
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
## 2019-06-17 NOTE — PROGRESS NOTES
Internal Medicine Progress Note    Lani Erp. Allan Bone., & [address] Allan Bone., F.A.C.O.I. Elan Garrido D.O., MELIZAO.I. Primary Care Physician: Judit Cruz DO   Admitting Physician:  Get Lr DO  Admission date and time: 6/13/2019  4:10 PM    Room:  [address] [place] [address]  Admitting diagnosis: Respiratory failure ([address] [J96.90]  Drug overdose, accidental or unintentional, initial encounter [T50.901A]    Patient Name: Wesly Iniguez  MRN: [id number]    Date of Service: 6/17/2019     Subjective:    Brittany Alarcon is a 48 y. o.  male who was seen and examined today,6/17/2019, at the bedside. Patient complained of vague abdominal pain and indigestion last evening. Lab work did show elevated transaminases with gallbladder ultrasound was obtained. This came back satisfactory and GI was consulted. Scheduled for possible endoscopy today. Hepatitis C came back positive. No signs of drug withdrawal    No family present during my examination. Review of System:  Bolded are positive  Constitutional:   Fever and chills. Weight loss. Weight gain. Fatigue. Malaise. HEENT:   Ear pain. Sore throat. Rhinorrhea. Sinus pressure. Eye pain. Discharge. Redness. Psch:   Depressed. Anxious. Cardiovascular:   Chest pain. Irregular heartbeats. Palpitations. Dyspnea on exertion. Orthopnea. Respiratory:   Shortness of breath. Coughing. Sputum production. Hemoptysis. Wheezing. Gastrointestinal:   Nausea. Vomiting. Diarrhea. Constipation. Mild abdominal pain/cramping. Extremities:   Lower extremity swelling. Edema. Cyanosis. Neurology:    Headache. Focal neurological deficits. Weakness. Paresthesia. Loss of Consciousness. Numbness. Memory difficulty. Integumentary:   Rashes. Ulcers. Excoriations. Bruising. Multiple tattoos. Genitourinary:    Urgency. Frequency. Hematuria. Voiding difficulty. Hematologic/Lymphatic:  Lymph node. Night sweats.   Musculoskeletal:    Gait disturbance. Myalgias. Joint complaints. Back Pain. Physical Exam:  No intake/output data recorded. Intake/Output Summary (Last 24 hours) at 6/17/2019 1149  Last data filed at 6/16/2019 2046  Gross per 24 hour   Intake 1340 ml   Output 1400 ml   Net -60 ml   I/O last 3 completed shifts: In: 1340 [P.O.:540; I.V.:800]  Out: 1400 [Urine:1400]  Patient Vitals for the past 96 hrs (Last 3 readings):   Weight   06/17/19 0615 170 lb (77.1 kg)   06/15/19 0600 174 lb 9.6 oz (79.2 kg)   06/14/19 2000 177 lb (80.3 kg)       Vital Signs:   Blood pressure 118/61, pulse 51, temperature 98 °F (36.7 °C), temperature source Oral, resp. rate 16, height 5' 7\" (1.702 m), weight 170 lb (77.1 kg), SpO2 94 %. Edilberto Sportscortes is a 48 y. o.  male who is alert, responsive, oriented to person, place, and time. General appearance:   Well preserved, mesomorphic body habitus, alert, no distress. Head:  Normocephalic. No masses, lesions or tenderness. Eyes:  PERRLA. EOMI. Sclera clear. Buccal mucosa moist.  ENT:  Ears normal. Mucosa normal.  Neck:    Supple. Trachea midline. No thyromegaly. No JVD. No bruits. Heart:    Rhythm regular. Rate controlled. No murmurs. Lungs:    Symmetrical. Clear to auscultation bilaterally. No wheezes. No rhonchi. No rales. Abdomen:   Soft. Non-distended. Bowel sounds positive. No organomegaly or masses. Mild tenderness lower abdomen. Extremities:    Peripheral pulses present. Trace lower leg edema. No ulcers. No cyanosis. No clubbing. Neurologic:    Alert x 3. No focal deficit. Cranial nerves grossly intact. No focal weakness. Psych:   Behavior is normal. Mood appears normal. Speech is not rapid and/or pressured. Musculoskeletal:   Spine ROM normal. Muscular strength intact. Gait not assessed. Skin:    No rashes  Skin normal color and texture.   Multiple tattoos   Genitalia/Breast:  Deferred        Allergy:  No Known Allergies     Medication:  Scheduled Meds:   levofloxacin  750 mg Oral ventricles, sulci, and cisterns are normal in size and configuration. The brain parenchyma is normal in density. There is no evidence of intracranial hemorrhage or mass. No extra-axial fluid is seen. The paranasal sinuses are clear. The globes and orbits are normal.  No abnormalities of the calvarium are identified. 1. No acute findings. Ct Cervical Spine Wo Contrast    Result Date: 6/13/2019  Location: 200 CT CERVICAL SPINE WITHOUT IV CONTRAST WITH RECONSTRUCTION Clinical indication: status post fall, overdose No previous study for comparison. TECHNIQUE; Serial axial images were obtained through the cervical spine followed by reconstruction in sagittal and coronal planes. CT technique includes automated exposure control. FINDINGS; The vertebral bodies are normal in height. Straight alignment of the cervical spine but without significant spondylolisthesis. The vertebral bodies show no acute compression fracture or listhesis. The visualized pedicles appear unremarkable. The visualized posterior facet joints show no acute fracture or dislocation. . Mild arthritic changes of uncovertebral joints bilaterally at C6-C7 level. The visualized spinous processes appear unremarkable. The odontoid process is unremarkable with no significant rotational abnormality. The C1-C2 junction is unremarkable. The atlanto-occipital joint is intact. The prevertebral space is within normal limits. The airway is intact. The bony spinal canal is intact. Neural foraminal narrowing, moderate on the right at C3-C4 and C7-T1 levels, severe on the right and moderate on the left at C4-C5 level, mild on the right and severe on the left at C5-C6 level, and moderate bilaterally at C6-C7 level. Intervertebral disc space narrowing, mild at C5-C6 level and severe at C6-C7 and C7-T1 levels. No acute traumatic bony injury.      Ct Cervical Spine Wo Contrast    Result Date: 6/13/2019  LOCATION: 200 EXAM: CT HEAD WO CONTRAST, CT CERVICAL SPINE WO CONTRAST COMPARISON: None HISTORY: Fall headache TECHNIQUE: Noncontrast helical CT images were performed of the head. Coronal and sagittal reconstructions also obtained. Automated dose control was used for this exam. CONTRAST: No intravenous contrast administered. FINDINGS: HEAD: The ventricles, sulci, and cisterns are normal in size and configuration. The brain parenchyma is normal in density. There is no evidence of intracranial hemorrhage or mass. No extra-axial fluid is seen. The paranasal sinuses are clear. The globes and orbits are normal.  No abnormalities of the calvarium are identified. 1. No acute findings. Xr Chest Portable    Result Date: 6/14/2019  Reading location: 200 Indication: Intubation, shortness of breath Comparison: Prior chest radiograph from 6/13/2019 Technique: Portable AP upright chest radiograph was obtained. Findings: An endotracheal tube tip terminates within the trachea. The cardiomediastinal silhouette is stable in size and contours. There is slight decreased pulmonary vascular congestion. There is interval improvement of right midlung atelectasis. There is slight increased right basilar atelectasis. Costophrenic angles are clear. There is no evidence of pneumothorax. There are post-ORIF changes of the right proximal humerus. 1. Endotracheal tube tip in appropriate position. 2. Slight decreased perivascular congestion with interval improvement of right midlung atelectasis. 3. Slight increased right basilar atelectasis. Xr Chest Portable    Result Date: 6/13/2019  LOCATION: 200 CHEST Clinical indication: ET tube placement No previous study for comparison. TECHNIQUE; The portable single frontal view of the chest was obtained in supine position. FINDINGS; Interval placement of ET tube is noted with its tip about 1.9 cm above the bronchial angeles. The image shows plate atelectasis in the right midlung versus fluid along the minor fissure.  No infiltrate or focal consolidation. No definite effusion. The trachea is midline. No pneumothorax. No significant focal pleural pathology. The cardiomediastinal shadows show borderline cardiomegaly with minimal pulmonary vascular congestion with no significant hilar adenopathy. 1. Interval placement of ET tube with its tip 1.9 cm above the bronchial angeles. 2. Borderline cardiomegaly with minimal pulmonary vascular congestion. 3. Plate atelectasis in the right midlung versus minimal fluid along the right minor fissure. Chronic Hospital Medical Problems:  Past Medical History:   Diagnosis Date    Arthritis     Aspiration pneumonia of both lungs due to gastric secretions ([facility] 6/10/2017    Bipolar 1 disorder (HCC)     Hepatitis C     Schizophrenia ([facility]      Principal Problem:    Drug overdose  Active Problems:    Respiratory failure ([facility]    Drug overdose, accidental or unintentional, initial encounter  Resolved Problems:    * No resolved hospital problems. *      Assessment:  · Unresponsive due to likely overdose of methadone  · Toxic encephalopathy secondary to above  · Opioid intoxication delirium  · Opioid use disorder  · Rhabdomyolysis  · Acute hypoxemic respiratory failure requiring mechanical ventilation  · Substantial history of drug abuse with multiple prior overdoses  · Elevated CK  · Bipolar disorder with schizophrenia  · Hepatitis C   · Hypophosphatemia    Plan:   Patient appears to be stable at the present time. GI is planning endoscopy today. He is tolerating IVs being out and his lab work remained relatively stable. Pending results of endoscopy hopefully discharge tomorrow with outpatient care and treatment. I am somewhat skeptical about his compliance with abstinence from drugs and drinking and also if he would be compliant with outpatient treatment for hepatitis C.     More than 50% of my  time was spent at the bedside counseling and/or coordination of care with the patient and/or family with face to face contact. This time was spent reviewing notes and laboratory data, instructing and counseling the patient. Time I spent with the family or surrogate(s) is included only if the patient was incapable of providing the necessary information or participating in medical decisionsI also discussed the differential diagnosis and all of the proposed management plans with the patient and individuals accompanying the patient. PI reviewed the patient's past medical, surgical history and medication. Please see orders for further plan of care. Rhythm strips reviewed as well as consultant recommendations/notes and/or discussion. Patient's medications were reviewed/continued/adjusted. Labs as ordered. Please see orders for further plan of care. I reviewed the  course of events since last visit. Danie Cordova DO, F.A.C.O.I.   On 6/17/2019  11:49 AM

## 2019-06-17 NOTE — CARE COORDINATION
SS NOTE: Lilibeth Llamas from [employer] contacted this SW this AM.  He relates that this pt is in agreement with the need for Recovery Services after dch. An [place]ar[place] will meet with pt again today to work on finalizing a dch plan for inpt recovery. Judith Suero. 6/17/2019.9:43 AM.

## 2019-06-17 NOTE — PLAN OF CARE
Problem: Pain:  Goal: Patient's pain/discomfort is manageable  Description  Patient's pain/discomfort is manageable  Outcome: Met This Shift     Problem: Anxiety:  Goal: Level of anxiety will decrease  Description  Level of anxiety will decrease   6/17/2019 1803 by Precious Jacob RN  Outcome: Met This Shift     Problem: Daily Care:  Goal: Daily care needs are met  Description  Daily care needs are met  6/17/2019 1803 by Precious Jacob RN  Outcome: Met This Shift     Problem: Safety:  Goal: Free from accidental physical injury  Description  Free from accidental physical injury  Outcome: Met This Shift

## 2019-06-18 VITALS
SYSTOLIC BLOOD PRESSURE: 114 MMHG | BODY MASS INDEX: 26.73 KG/M2 | HEIGHT: 67 IN | RESPIRATION RATE: 16 BRPM | TEMPERATURE: 98.4 F | HEART RATE: 74 BPM | DIASTOLIC BLOOD PRESSURE: 82 MMHG | OXYGEN SATURATION: 95 % | WEIGHT: 170.3 LBS

## 2019-06-18 LAB
ALBUMIN SERPL-MCNC: 3.7 G/DL (ref 3.5–5.2)
ALP BLD-CCNC: 78 U/L (ref 40–129)
ALT SERPL-CCNC: 234 U/L (ref 0–40)
ANION GAP SERPL CALCULATED.3IONS-SCNC: 13 MMOL/L (ref 7–16)
AST SERPL-CCNC: 113 U/L (ref 0–39)
BILIRUB SERPL-MCNC: 1 MG/DL (ref 0–1.2)
BUN BLDV-MCNC: 16 MG/DL (ref 6–20)
CALCIUM SERPL-MCNC: 8.6 MG/DL (ref 8.6–10.2)
CHLORIDE BLD-SCNC: 103 MMOL/L (ref 98–107)
CO2: 23 MMOL/L (ref 22–29)
CREAT SERPL-MCNC: 0.8 MG/DL (ref 0.7–1.2)
EDDP, URINE: 3252 NG/ML
FERRITIN: 94 NG/ML
GFR AFRICAN AMERICAN: >60
GFR NON-AFRICAN AMERICAN: >60 ML/MIN/1.73
GLUCOSE BLD-MCNC: 93 MG/DL (ref 74–99)
HAV IGM SER IA-ACNC: ABNORMAL
HCT VFR BLD CALC: 46.8 % (ref 37–54)
HEMOGLOBIN: 16.5 G/DL (ref 12.5–16.5)
HEPATITIS B CORE IGM ANTIBODY: ABNORMAL
HEPATITIS B SURFACE ANTIGEN INTERPRETATION: ABNORMAL
HEPATITIS C ANTIBODY INTERPRETATION: REACTIVE
INR BLD: 1.1
IRON SATURATION: 30 % (ref 20–55)
IRON: 92 MCG/DL (ref 59–158)
MCH RBC QN AUTO: 31.5 PG (ref 26–35)
MCHC RBC AUTO-ENTMCNC: 35.3 % (ref 32–34.5)
MCV RBC AUTO: 89.5 FL (ref 80–99.9)
METHADONE, URINE: 582 NG/ML
PDW BLD-RTO: 12.7 FL (ref 11.5–15)
PLATELET # BLD: 232 E9/L (ref 130–450)
PMV BLD AUTO: 10.2 FL (ref 7–12)
POTASSIUM SERPL-SCNC: 3.6 MMOL/L (ref 3.5–5)
PROTHROMBIN TIME: 12.8 SEC (ref 9.3–12.4)
RBC # BLD: 5.23 E12/L (ref 3.8–5.8)
SODIUM BLD-SCNC: 139 MMOL/L (ref 132–146)
TOTAL IRON BINDING CAPACITY: 309 MCG/DL (ref 250–450)
TOTAL PROTEIN: 7.3 G/DL (ref 6.4–8.3)
WBC # BLD: 6.5 E9/L (ref 4.5–11.5)

## 2019-06-18 PROCEDURE — 6370000000 HC RX 637 (ALT 250 FOR IP): Performed by: INTERNAL MEDICINE

## 2019-06-18 PROCEDURE — 85027 COMPLETE CBC AUTOMATED: CPT

## 2019-06-18 PROCEDURE — 6360000002 HC RX W HCPCS: Performed by: INTERNAL MEDICINE

## 2019-06-18 PROCEDURE — 85610 PROTHROMBIN TIME: CPT

## 2019-06-18 PROCEDURE — 83540 ASSAY OF IRON: CPT

## 2019-06-18 PROCEDURE — 83550 IRON BINDING TEST: CPT

## 2019-06-18 PROCEDURE — 86038 ANTINUCLEAR ANTIBODIES: CPT

## 2019-06-18 PROCEDURE — 82390 ASSAY OF CERULOPLASMIN: CPT

## 2019-06-18 PROCEDURE — 82103 ALPHA-1-ANTITRYPSIN TOTAL: CPT

## 2019-06-18 PROCEDURE — 86255 FLUORESCENT ANTIBODY SCREEN: CPT

## 2019-06-18 PROCEDURE — 36415 COLL VENOUS BLD VENIPUNCTURE: CPT

## 2019-06-18 PROCEDURE — 82728 ASSAY OF FERRITIN: CPT

## 2019-06-18 PROCEDURE — 80053 COMPREHEN METABOLIC PANEL: CPT

## 2019-06-18 RX ADMIN — ENOXAPARIN SODIUM 40 MG: 40 INJECTION SUBCUTANEOUS at 08:55

## 2019-06-18 RX ADMIN — DOCUSATE SODIUM 100 MG: 100 CAPSULE, LIQUID FILLED ORAL at 08:55

## 2019-06-18 RX ADMIN — LEVOFLOXACIN 750 MG: 750 TABLET, FILM COATED ORAL at 08:55

## 2019-06-18 ASSESSMENT — PAIN SCALES - GENERAL
PAINLEVEL_OUTOF10: 0
PAINLEVEL_OUTOF10: 0

## 2019-06-18 NOTE — PLAN OF CARE
Pertinent notes/labs reviewed. OK to be d/c from GI POV. Follow in office in 1-2 weeks or earlier as needed - patient to call for appointment and with questions/concerns at [phone]. Thank you for the opportunity to participate in the care of . Mc Pickett MD  6/18/2019  10:49 AM

## 2019-06-18 NOTE — PROGRESS NOTES
P Quality Flow/Interdisciplinary Rounds Progress Note        Quality Flow Rounds held on June 18, 2019    Disciplines Attending:  Bedside Nurse, ,  and Nursing Unit Leadership    Kiki Santoro was admitted on 6/13/2019  4:10 PM    Anticipated Discharge Date:  Expected Discharge Date: 06/18/19    Disposition:    Dylan Score:  Dylan Scale Score: 21    Readmission Risk              Risk of Unplanned Readmission:        14           Discussed patient goal for the day, patient clinical progression, and barriers to discharge.   The following Goal(s) of the Day/Commitment(s) have been identified:  Prompting for DC      [facility]  June 18, 2019

## 2019-06-19 LAB
ALPHA-1 ANTITRYPSIN: 176 MG/DL (ref 90–200)
ANTI-MITOCHONDRIAL AB, IFA: NEGATIVE
ANTI-NUCLEAR ANTIBODY (ANA): NEGATIVE
BLOOD CULTURE, ROUTINE: NORMAL
CULTURE, BLOOD 2: NORMAL
SMOOTH MUSCLE ANTIBODY: NEGATIVE

## 2019-06-19 NOTE — DISCHARGE SUMMARY
soiling  himself. The patient presented to the hospital here with drug overdose. The patient at this time presented to the hospital here with altered  mental status and currently intubated. PAST MEDICAL HISTORY:  Positive for history of arthritis, aspiration  pneumonia, bipolar disorder, hepatitis C, and schizophrenia. PHYSICAL EXAMINATION:  VITAL SIGNS:  At the time of admission revealed blood pressure of  134/82, pulse 97, respirations 18. Afebrile. GENERAL APPEARANCE:  This was an intubated 63-year-old white male who  was sedated; otherwise, nonverbal.  HEENT:  Normal.  LUNGS:  Coarse. HEART:  Fairly regular. ABDOMEN:  Soft. EXTREMITIES:  No edema. NEUROLOGIC:  Revealed intubation and sedation. While the patient was in the hospital, he had the following laboratory  studies performed:  Hepatitis C was satisfactory. Chem-1 profile was  normal.  At the time of discharge, though, AST and ALT were slightly  elevated at 113 and 234 respectively. INR was 1.1, and CBC was stable. HOSPITAL COURSE OF STAY:  The patient was admitted to the hospital to  the intensive coronary care unit. The patient at this time was in the  intensive care unit. The patient at this time had been intubated  because of altered mental status. We proceeded with further workup at  this time including radiographic finding and closed monitored with the  intensivist.  For the most part, the patient became more alert and did  satisfactorily. The patient was subsequently extubated at this time. Prior to this, he did have extensive workup performed in the emergency  room because of unresponsive state, including CT of the cervical spine,  x-ray of the chest and CT of the head, which came back satisfactory. The patient for the most part did relatively well. He was extubated the  following day. The patient became more alert. The patient, over the  next several days, clinically improved.   He was transferred to the  regular room on 06/16 and progressed satisfactorily. He did have some  problem with elevated liver enzymes. This was followed up by GI, who  performed evaluation on him. For the most part, the patient did  satisfactorily. Further clinical diagnosis and workup was performed. The patient continued to improve at this time. For the most part, the  patient did satisfactorily. He was seen by GI and was felt stable  enough to be discharged home on 06/18. At the time of discharge on 06/18, his blood pressure was 114/82, pulse  74, respirations 18. He was afebrile. The patient will be followed up  by his primary care doctor on an outpatient basis. The patient will  also follow up with GI because of hepatitis C and liver enzymes. Ultrasound did show no evidence of any significant cholelithiasis at the  time of discharge and will be followed up accordingly. Mostly, these  symptoms appeared to be possibly related to overdose and medication  ingestion. More than 30 minutes were spent on the day of discharge with  more than 50% of the time spent face-to-face with the patient. He will  follow up with Dr. Sabas Salmeron to see whether or not he will be resumed back  on his previous medications of lithium, Prozac, Valium. Otherwise, at  the time of discharge, he was stable. He was offered  counseling,  which he declined at this time. Further change will be based upon  clinical course and treatment. Hopefully, the patient will continue to  be more aggressive in his wellness and behavior at this time and make  better life choices.       [address], DO    D: 06/18/2019 11:37:28       T: 06/19/2019 10:21:45     ELLA/CARLOS [email or web address]  Job#: [id number]     Doc#: [id number]    CC:

## 2019-06-20 LAB — CERULOPLASMIN: 35 MG/DL (ref 15–30)

## 2019-08-11 ENCOUNTER — APPOINTMENT (OUTPATIENT)
Dept: GENERAL RADIOLOGY | Age: 54
DRG: 317 | End: 2019-08-11
Payer: COMMERCIAL

## 2019-08-11 ENCOUNTER — HOSPITAL ENCOUNTER (OUTPATIENT)
Age: 54
Discharge: HOME | End: 2019-08-11
Payer: COMMERCIAL

## 2019-08-11 ENCOUNTER — APPOINTMENT (OUTPATIENT)
Dept: GENERAL RADIOLOGY | Age: 54
End: 2019-08-11
Payer: COMMERCIAL

## 2019-08-11 ENCOUNTER — ANESTHESIA (OUTPATIENT)
Dept: OPERATING ROOM | Age: 54
DRG: 317 | End: 2019-08-11
Payer: COMMERCIAL

## 2019-08-11 ENCOUNTER — APPOINTMENT (OUTPATIENT)
Dept: ULTRASOUND IMAGING | Age: 54
End: 2019-08-11
Payer: COMMERCIAL

## 2019-08-11 ENCOUNTER — ANESTHESIA EVENT (OUTPATIENT)
Dept: OPERATING ROOM | Age: 54
DRG: 317 | End: 2019-08-11
Payer: COMMERCIAL

## 2019-08-11 ENCOUNTER — HOSPITAL ENCOUNTER (EMERGENCY)
Age: 54
Discharge: ANOTHER HOSPITAL | End: 2019-08-11
Attending: EMERGENCY MEDICINE
Payer: COMMERCIAL

## 2019-08-11 ENCOUNTER — HOSPITAL ENCOUNTER (INPATIENT)
Age: 54
LOS: 17 days | Discharge: HOME HEALTH | DRG: 317 | End: 2019-08-28
Attending: EMERGENCY MEDICINE | Admitting: INTERNAL MEDICINE
Payer: COMMERCIAL

## 2019-08-11 ENCOUNTER — APPOINTMENT (OUTPATIENT)
Dept: CT IMAGING | Age: 54
DRG: 317 | End: 2019-08-11
Payer: COMMERCIAL

## 2019-08-11 VITALS
BODY MASS INDEX: 26.68 KG/M2 | OXYGEN SATURATION: 97 % | RESPIRATION RATE: 16 BRPM | TEMPERATURE: 94.8 F | HEART RATE: 100 BPM | WEIGHT: 170 LBS | DIASTOLIC BLOOD PRESSURE: 102 MMHG | HEIGHT: 67 IN | SYSTOLIC BLOOD PRESSURE: 195 MMHG

## 2019-08-11 VITALS
OXYGEN SATURATION: 93 % | SYSTOLIC BLOOD PRESSURE: 83 MMHG | RESPIRATION RATE: 9 BRPM | DIASTOLIC BLOOD PRESSURE: 62 MMHG

## 2019-08-11 DIAGNOSIS — N17.9 AKI (ACUTE KIDNEY INJURY) (HCC): ICD-10-CM

## 2019-08-11 DIAGNOSIS — G93.40 ACUTE ENCEPHALOPATHY: Primary | ICD-10-CM

## 2019-08-11 DIAGNOSIS — T50.904A DRUG OVERDOSE, UNDETERMINED INTENT, INITIAL ENCOUNTER: ICD-10-CM

## 2019-08-11 DIAGNOSIS — T50.902A DRUG OVERDOSE, INTENTIONAL, INITIAL ENCOUNTER (HCC): Primary | ICD-10-CM

## 2019-08-11 DIAGNOSIS — F19.10 POLYSUBSTANCE ABUSE (HCC): ICD-10-CM

## 2019-08-11 DIAGNOSIS — J96.01 ACUTE RESPIRATORY FAILURE WITH HYPOXIA (HCC): ICD-10-CM

## 2019-08-11 DIAGNOSIS — F19.10 SUBSTANCE ABUSE (HCC): ICD-10-CM

## 2019-08-11 DIAGNOSIS — M79.A21 NON-TRAUMATIC COMPARTMENT SYNDROME OF RIGHT LOWER EXTREMITY: ICD-10-CM

## 2019-08-11 LAB
AADO2: 198.9 MMHG
AADO2: 384.8 MMHG
ABO/RH: NORMAL
ACETAMINOPHEN LEVEL: <5 MCG/ML (ref 10–30)
ALBUMIN SERPL-MCNC: 4.7 G/DL (ref 3.5–5.2)
ALP BLD-CCNC: 102 U/L (ref 40–129)
ALT SERPL-CCNC: 90 U/L (ref 0–40)
AMMONIA: 137 UMOL/L (ref 16–60)
AMPHETAMINE SCREEN, URINE: POSITIVE
ANION GAP SERPL CALCULATED.3IONS-SCNC: 30 MMOL/L (ref 7–16)
ANTIBODY SCREEN: NORMAL
AST SERPL-CCNC: 140 U/L (ref 0–39)
B.E.: -6.4 MMOL/L (ref -3–3)
B.E.: -6.6 MMOL/L (ref -3–3)
B.E.: -7.8 MMOL/L (ref -3–3)
BACTERIA: ABNORMAL /HPF
BARBITURATE SCREEN URINE: NOT DETECTED
BASOPHILS ABSOLUTE: 0.04 E9/L (ref 0–0.2)
BASOPHILS RELATIVE PERCENT: 0.3 % (ref 0–2)
BENZODIAZEPINE SCREEN, URINE: POSITIVE
BILIRUB SERPL-MCNC: 0.4 MG/DL (ref 0–1.2)
BILIRUBIN URINE: NEGATIVE
BLOOD, URINE: ABNORMAL
BUN BLDV-MCNC: 27 MG/DL (ref 6–20)
CALCIUM SERPL-MCNC: 8.9 MG/DL (ref 8.6–10.2)
CANNABINOID SCREEN URINE: NOT DETECTED
CHLORIDE BLD-SCNC: 97 MMOL/L (ref 98–107)
CHLORIDE URINE RANDOM: <20 MMOL/L
CHP ED QC CHECK: NORMAL
CLARITY: CLEAR
CO2: 18 MMOL/L (ref 22–29)
COCAINE METABOLITE SCREEN URINE: POSITIVE
COHB: 0.6 % (ref 0–1.5)
COHB: 1.1 % (ref 0–1.5)
COHB: 2 % (ref 0–1.5)
COLOR: YELLOW
CREAT SERPL-MCNC: 2 MG/DL (ref 0.7–1.2)
CREATININE URINE: 170 MG/DL (ref 40–278)
CRITICAL: ABNORMAL
DATE ANALYZED: ABNORMAL
DATE OF COLLECTION: ABNORMAL
EOSINOPHILS ABSOLUTE: 0.01 E9/L (ref 0.05–0.5)
EOSINOPHILS RELATIVE PERCENT: 0.1 % (ref 0–6)
EPITHELIAL CELLS, UA: ABNORMAL /HPF
ETHANOL: <10 MG/DL (ref 0–0.08)
FIO2: 70 %
FIO2: 80 %
GFR AFRICAN AMERICAN: 42
GFR NON-AFRICAN AMERICAN: 35 ML/MIN/1.73
GLUCOSE BLD-MCNC: 124 MG/DL
GLUCOSE BLD-MCNC: 127 MG/DL (ref 74–99)
GLUCOSE URINE: 100 MG/DL
HCO3: 17.2 MMOL/L (ref 22–26)
HCO3: 20.4 MMOL/L (ref 22–26)
HCO3: 22.3 MMOL/L (ref 22–26)
HCT VFR BLD CALC: 52.5 % (ref 37–54)
HEMOGLOBIN: 16.8 G/DL (ref 12.5–16.5)
HHB: 0.6 % (ref 0–5)
HHB: 2.6 % (ref 0–5)
HHB: 3.3 % (ref 0–5)
IMMATURE GRANULOCYTES #: 0.11 E9/L
IMMATURE GRANULOCYTES %: 0.7 % (ref 0–5)
INR BLD: 1
KETONES, URINE: NEGATIVE MG/DL
LAB: ABNORMAL
LACTIC ACID, SEPSIS: 12.4 MMOL/L (ref 0.5–1.9)
LACTIC ACID: 3 MMOL/L (ref 0.5–2.2)
LEUKOCYTE ESTERASE, URINE: NEGATIVE
LYMPHOCYTES ABSOLUTE: 0.82 E9/L (ref 1.5–4)
LYMPHOCYTES RELATIVE PERCENT: 5.4 % (ref 20–42)
Lab: ABNORMAL
MCH RBC QN AUTO: 31.3 PG (ref 26–35)
MCHC RBC AUTO-ENTMCNC: 32 % (ref 32–34.5)
MCV RBC AUTO: 97.9 FL (ref 80–99.9)
METER GLUCOSE: 122 MG/DL (ref 74–99)
METHADONE SCREEN, URINE: POSITIVE
METHB: 0.5 % (ref 0–1.5)
MODE: AC
MONOCYTES ABSOLUTE: 0.9 E9/L (ref 0.1–0.95)
MONOCYTES RELATIVE PERCENT: 6 % (ref 2–12)
NEUTROPHILS ABSOLUTE: 13.2 E9/L (ref 1.8–7.3)
NEUTROPHILS RELATIVE PERCENT: 87.5 % (ref 43–80)
NITRITE, URINE: NEGATIVE
O2 CONTENT: 21 ML/DL
O2 CONTENT: 21.6 ML/DL
O2 CONTENT: 22.5 ML/DL
O2 SATURATION: 96.6 % (ref 92–98.5)
O2 SATURATION: 97.4 % (ref 92–98.5)
O2 SATURATION: 99.4 % (ref 92–98.5)
O2HB: 94.2 % (ref 94–97)
O2HB: 95.8 % (ref 94–97)
O2HB: 98.3 % (ref 94–97)
OPERATOR ID: 1023
OPERATOR ID: 274
OPERATOR ID: 7278
OPIATE SCREEN URINE: NOT DETECTED
OSMOLALITY URINE: 513 MOSM/KG (ref 300–900)
OSMOLALITY: 307 MOSM/KG (ref 285–310)
PATIENT TEMP: 37
PATIENT TEMP: 37 C
PATIENT TEMP: 37 C
PCO2: 30.5 MMHG (ref 35–45)
PCO2: 52 MMHG (ref 35–45)
PCO2: 56.4 MMHG (ref 35–45)
PDW BLD-RTO: 13.7 FL (ref 11.5–15)
PEEP/CPAP: 5 CMH2O
PFO2: 1.33 MMHG/%
PFO2: 3.57 MMHG/%
PH BLOOD GAS: 7.21 (ref 7.35–7.45)
PH BLOOD GAS: 7.21 (ref 7.35–7.45)
PH BLOOD GAS: 7.37 (ref 7.35–7.45)
PH UA: 5.5 (ref 5–9)
PHENCYCLIDINE SCREEN URINE: NOT DETECTED
PLATELET # BLD: 323 E9/L (ref 130–450)
PMV BLD AUTO: 9.5 FL (ref 7–12)
PO2: 106.4 MMHG (ref 60–100)
PO2: 249.9 MMHG (ref 60–100)
PO2: 97.5 MMHG (ref 60–100)
POTASSIUM SERPL-SCNC: 4.52 MMOL/L (ref 3.3–5.1)
POTASSIUM SERPL-SCNC: 4.7 MMOL/L (ref 3.5–5)
POTASSIUM, UR: 59 MMOL/L
PROCALCITONIN: 1.21 NG/ML (ref 0–0.08)
PROPOXYPHENE SCREEN: NOT DETECTED
PROTEIN UA: 30 MG/DL
PROTHROMBIN TIME: 10.8 SEC (ref 9.3–12.4)
RBC # BLD: 5.36 E12/L (ref 3.8–5.8)
RBC UA: ABNORMAL /HPF (ref 0–2)
RI(T): 0.8
RI(T): 362 %
RR MECHANICAL: 14 B/MIN
RR MECHANICAL: 18 B/MIN
RR MECHANICAL: 20 B/MIN
SALICYLATE, SERUM: <0.3 MG/DL (ref 0–30)
SODIUM BLD-SCNC: 145 MMOL/L (ref 132–146)
SODIUM URINE: <20 MMOL/L
SOURCE, BLOOD GAS: ABNORMAL
SPECIFIC GRAVITY UA: >=1.03 (ref 1–1.03)
THB: 15.8 G/DL (ref 11.5–16.5)
THB: 15.9 G/DL (ref 11.5–16.5)
THB: 16 G/DL (ref 11.5–16.5)
TIME ANALYZED: 1424
TIME ANALYZED: 1719
TIME ANALYZED: 2341
TOTAL CK: ABNORMAL U/L (ref 20–200)
TOTAL CK: ABNORMAL U/L (ref 20–200)
TOTAL PROTEIN: 8.6 G/DL (ref 6.4–8.3)
TRICYCLIC ANTIDEPRESSANTS SCREEN SERUM: NEGATIVE NG/ML
TROPONIN: 0.02 NG/ML (ref 0–0.03)
TROPONIN: <0.01 NG/ML (ref 0–0.03)
UROBILINOGEN, URINE: 0.2 E.U./DL
VT MECHANICAL: 400 ML
VT MECHANICAL: 400 ML
VT MECHANICAL: 500 ML
WBC # BLD: 15.1 E9/L (ref 4.5–11.5)
WBC UA: ABNORMAL /HPF (ref 0–5)

## 2019-08-11 PROCEDURE — 82140 ASSAY OF AMMONIA: CPT

## 2019-08-11 PROCEDURE — 93005 ELECTROCARDIOGRAM TRACING: CPT | Performed by: STUDENT IN AN ORGANIZED HEALTH CARE EDUCATION/TRAINING PROGRAM

## 2019-08-11 PROCEDURE — 51702 INSERT TEMP BLADDER CATH: CPT

## 2019-08-11 PROCEDURE — A0427 ALS1-EMERGENCY: HCPCS

## 2019-08-11 PROCEDURE — 94002 VENT MGMT INPAT INIT DAY: CPT

## 2019-08-11 PROCEDURE — 6360000002 HC RX W HCPCS: Performed by: EMERGENCY MEDICINE

## 2019-08-11 PROCEDURE — 99285 EMERGENCY DEPT VISIT HI MDM: CPT

## 2019-08-11 PROCEDURE — 3700000001 HC ADD 15 MINUTES (ANESTHESIA): Performed by: ORTHOPAEDIC SURGERY

## 2019-08-11 PROCEDURE — 83935 ASSAY OF URINE OSMOLALITY: CPT

## 2019-08-11 PROCEDURE — 82436 ASSAY OF URINE CHLORIDE: CPT

## 2019-08-11 PROCEDURE — 80307 DRUG TEST PRSMV CHEM ANLYZR: CPT

## 2019-08-11 PROCEDURE — 86850 RBC ANTIBODY SCREEN: CPT

## 2019-08-11 PROCEDURE — 87081 CULTURE SCREEN ONLY: CPT

## 2019-08-11 PROCEDURE — 2500000003 HC RX 250 WO HCPCS: Performed by: EMERGENCY MEDICINE

## 2019-08-11 PROCEDURE — 86900 BLOOD TYPING SEROLOGIC ABO: CPT

## 2019-08-11 PROCEDURE — 82962 GLUCOSE BLOOD TEST: CPT

## 2019-08-11 PROCEDURE — 2580000003 HC RX 258: Performed by: EMERGENCY MEDICINE

## 2019-08-11 PROCEDURE — 82550 ASSAY OF CK (CPK): CPT

## 2019-08-11 PROCEDURE — 70450 CT HEAD/BRAIN W/O DYE: CPT

## 2019-08-11 PROCEDURE — 2500000003 HC RX 250 WO HCPCS: Performed by: INTERNAL MEDICINE

## 2019-08-11 PROCEDURE — 93005 ELECTROCARDIOGRAM TRACING: CPT | Performed by: INTERNAL MEDICINE

## 2019-08-11 PROCEDURE — 2000000000 HC ICU R&B

## 2019-08-11 PROCEDURE — 6360000002 HC RX W HCPCS: Performed by: STUDENT IN AN ORGANIZED HEALTH CARE EDUCATION/TRAINING PROGRAM

## 2019-08-11 PROCEDURE — 36600 WITHDRAWAL OF ARTERIAL BLOOD: CPT

## 2019-08-11 PROCEDURE — 94761 N-INVAS EAR/PLS OXIMETRY MLT: CPT

## 2019-08-11 PROCEDURE — 96375 TX/PRO/DX INJ NEW DRUG ADDON: CPT

## 2019-08-11 PROCEDURE — 96374 THER/PROPH/DIAG INJ IV PUSH: CPT

## 2019-08-11 PROCEDURE — 36592 COLLECT BLOOD FROM PICC: CPT

## 2019-08-11 PROCEDURE — 85610 PROTHROMBIN TIME: CPT

## 2019-08-11 PROCEDURE — 87149 DNA/RNA DIRECT PROBE: CPT

## 2019-08-11 PROCEDURE — 83605 ASSAY OF LACTIC ACID: CPT

## 2019-08-11 PROCEDURE — G0480 DRUG TEST DEF 1-7 CLASSES: HCPCS

## 2019-08-11 PROCEDURE — 87088 URINE BACTERIA CULTURE: CPT

## 2019-08-11 PROCEDURE — 27602 DECOMPRESSION OF LOWER LEG: CPT | Performed by: ORTHOPAEDIC SURGERY

## 2019-08-11 PROCEDURE — 84484 ASSAY OF TROPONIN QUANT: CPT

## 2019-08-11 PROCEDURE — 85025 COMPLETE CBC W/AUTO DIFF WBC: CPT

## 2019-08-11 PROCEDURE — 71045 X-RAY EXAM CHEST 1 VIEW: CPT

## 2019-08-11 PROCEDURE — 36556 INSERT NON-TUNNEL CV CATH: CPT

## 2019-08-11 PROCEDURE — 84133 ASSAY OF URINE POTASSIUM: CPT

## 2019-08-11 PROCEDURE — 84300 ASSAY OF URINE SODIUM: CPT

## 2019-08-11 PROCEDURE — 80053 COMPREHEN METABOLIC PANEL: CPT

## 2019-08-11 PROCEDURE — 82805 BLOOD GASES W/O2 SATURATION: CPT

## 2019-08-11 PROCEDURE — 3700000000 HC ANESTHESIA ATTENDED CARE: Performed by: ORTHOPAEDIC SURGERY

## 2019-08-11 PROCEDURE — 3600000012 HC SURGERY LEVEL 2 ADDTL 15MIN: Performed by: ORTHOPAEDIC SURGERY

## 2019-08-11 PROCEDURE — 2580000003 HC RX 258: Performed by: INTERNAL MEDICINE

## 2019-08-11 PROCEDURE — 74018 RADEX ABDOMEN 1 VIEW: CPT

## 2019-08-11 PROCEDURE — 6360000002 HC RX W HCPCS: Performed by: INTERNAL MEDICINE

## 2019-08-11 PROCEDURE — 36415 COLL VENOUS BLD VENIPUNCTURE: CPT

## 2019-08-11 PROCEDURE — A0425 GROUND MILEAGE: HCPCS

## 2019-08-11 PROCEDURE — 2580000003 HC RX 258: Performed by: STUDENT IN AN ORGANIZED HEALTH CARE EDUCATION/TRAINING PROGRAM

## 2019-08-11 PROCEDURE — 02HV33Z INSERTION OF INFUSION DEVICE INTO SUPERIOR VENA CAVA, PERCUTANEOUS APPROACH: ICD-10-PCS | Performed by: INTERNAL MEDICINE

## 2019-08-11 PROCEDURE — 6370000000 HC RX 637 (ALT 250 FOR IP): Performed by: INTERNAL MEDICINE

## 2019-08-11 PROCEDURE — 72125 CT NECK SPINE W/O DYE: CPT

## 2019-08-11 PROCEDURE — 6360000002 HC RX W HCPCS: Performed by: PHYSICIAN ASSISTANT

## 2019-08-11 PROCEDURE — 84145 PROCALCITONIN (PCT): CPT

## 2019-08-11 PROCEDURE — 2709999900 HC NON-CHARGEABLE SUPPLY: Performed by: ORTHOPAEDIC SURGERY

## 2019-08-11 PROCEDURE — 81001 URINALYSIS AUTO W/SCOPE: CPT

## 2019-08-11 PROCEDURE — 83930 ASSAY OF BLOOD OSMOLALITY: CPT

## 2019-08-11 PROCEDURE — 3600000002 HC SURGERY LEVEL 2 BASE: Performed by: ORTHOPAEDIC SURGERY

## 2019-08-11 PROCEDURE — 82570 ASSAY OF URINE CREATININE: CPT

## 2019-08-11 PROCEDURE — 86901 BLOOD TYPING SEROLOGIC RH(D): CPT

## 2019-08-11 PROCEDURE — 87040 BLOOD CULTURE FOR BACTERIA: CPT

## 2019-08-11 PROCEDURE — 99253 IP/OBS CNSLTJ NEW/EST LOW 45: CPT | Performed by: ORTHOPAEDIC SURGERY

## 2019-08-11 PROCEDURE — 84132 ASSAY OF SERUM POTASSIUM: CPT

## 2019-08-11 PROCEDURE — 97605 NEG PRS WND THER DME<=50SQCM: CPT | Performed by: ORTHOPAEDIC SURGERY

## 2019-08-11 RX ORDER — ASPIRIN 325 MG
325 TABLET, DELAYED RELEASE (ENTERIC COATED) ORAL 2 TIMES DAILY
Qty: 56 TABLET | Refills: 0 | Status: SHIPPED | OUTPATIENT
Start: 2019-08-11 | End: 2019-09-17

## 2019-08-11 RX ORDER — SODIUM CHLORIDE 9 MG/ML
INJECTION, SOLUTION INTRAVENOUS CONTINUOUS
Status: DISCONTINUED | OUTPATIENT
Start: 2019-08-11 | End: 2019-08-12

## 2019-08-11 RX ORDER — ONDANSETRON 2 MG/ML
4 INJECTION INTRAMUSCULAR; INTRAVENOUS EVERY 6 HOURS PRN
Status: DISCONTINUED | OUTPATIENT
Start: 2019-08-11 | End: 2019-08-28 | Stop reason: HOSPADM

## 2019-08-11 RX ORDER — SODIUM CHLORIDE 9 MG/ML
INJECTION, SOLUTION INTRAVENOUS CONTINUOUS
Status: DISCONTINUED | OUTPATIENT
Start: 2019-08-11 | End: 2019-08-11

## 2019-08-11 RX ORDER — NALOXONE HYDROCHLORIDE 1 MG/ML
2 INJECTION INTRAMUSCULAR; INTRAVENOUS; SUBCUTANEOUS ONCE
Status: COMPLETED | OUTPATIENT
Start: 2019-08-11 | End: 2019-08-11

## 2019-08-11 RX ORDER — LORAZEPAM 2 MG/ML
1 INJECTION INTRAMUSCULAR ONCE
Status: COMPLETED | OUTPATIENT
Start: 2019-08-11 | End: 2019-08-11

## 2019-08-11 RX ORDER — SODIUM CHLORIDE 0.9 % (FLUSH) 0.9 %
10 SYRINGE (ML) INJECTION EVERY 12 HOURS SCHEDULED
Status: DISCONTINUED | OUTPATIENT
Start: 2019-08-11 | End: 2019-08-14 | Stop reason: SDUPTHER

## 2019-08-11 RX ORDER — 0.9 % SODIUM CHLORIDE 0.9 %
1000 INTRAVENOUS SOLUTION INTRAVENOUS ONCE
Status: COMPLETED | OUTPATIENT
Start: 2019-08-11 | End: 2019-08-11

## 2019-08-11 RX ORDER — LACTULOSE 10 G/15ML
20 SOLUTION ORAL 3 TIMES DAILY
Status: DISCONTINUED | OUTPATIENT
Start: 2019-08-11 | End: 2019-08-13

## 2019-08-11 RX ORDER — DOCUSATE SODIUM 100 MG/1
100 CAPSULE, LIQUID FILLED ORAL 2 TIMES DAILY
Status: DISCONTINUED | OUTPATIENT
Start: 2019-08-11 | End: 2019-08-14 | Stop reason: SDUPTHER

## 2019-08-11 RX ORDER — HEPARIN SODIUM 10000 [USP'U]/ML
5000 INJECTION, SOLUTION INTRAVENOUS; SUBCUTANEOUS 3 TIMES DAILY
Status: DISCONTINUED | OUTPATIENT
Start: 2019-08-11 | End: 2019-08-13

## 2019-08-11 RX ORDER — SODIUM CHLORIDE 0.9 % (FLUSH) 0.9 %
10 SYRINGE (ML) INJECTION PRN
Status: DISCONTINUED | OUTPATIENT
Start: 2019-08-11 | End: 2019-08-11 | Stop reason: SDUPTHER

## 2019-08-11 RX ORDER — SODIUM CHLORIDE 0.9 % (FLUSH) 0.9 %
10 SYRINGE (ML) INJECTION PRN
Status: DISCONTINUED | OUTPATIENT
Start: 2019-08-11 | End: 2019-08-14 | Stop reason: SDUPTHER

## 2019-08-11 RX ORDER — PROPOFOL 10 MG/ML
10 INJECTION, EMULSION INTRAVENOUS
Status: DISCONTINUED | OUTPATIENT
Start: 2019-08-11 | End: 2019-08-11 | Stop reason: HOSPADM

## 2019-08-11 RX ORDER — OXYCODONE HYDROCHLORIDE AND ACETAMINOPHEN 5; 325 MG/1; MG/1
1 TABLET ORAL EVERY 6 HOURS PRN
Qty: 28 TABLET | Refills: 0 | Status: SHIPPED | OUTPATIENT
Start: 2019-08-11 | End: 2019-08-18

## 2019-08-11 RX ORDER — SODIUM CHLORIDE 0.9 % (FLUSH) 0.9 %
10 SYRINGE (ML) INJECTION EVERY 12 HOURS SCHEDULED
Status: DISCONTINUED | OUTPATIENT
Start: 2019-08-11 | End: 2019-08-11 | Stop reason: SDUPTHER

## 2019-08-11 RX ORDER — PROPOFOL 10 MG/ML
10 INJECTION, EMULSION INTRAVENOUS
Status: DISCONTINUED | OUTPATIENT
Start: 2019-08-11 | End: 2019-08-12

## 2019-08-11 RX ORDER — ETOMIDATE 2 MG/ML
20 INJECTION INTRAVENOUS ONCE
Status: COMPLETED | OUTPATIENT
Start: 2019-08-11 | End: 2019-08-11

## 2019-08-11 RX ORDER — ROCURONIUM BROMIDE 10 MG/ML
80 INJECTION, SOLUTION INTRAVENOUS ONCE
Status: COMPLETED | OUTPATIENT
Start: 2019-08-11 | End: 2019-08-11

## 2019-08-11 RX ADMIN — SODIUM CHLORIDE: 9 INJECTION, SOLUTION INTRAVENOUS at 20:00

## 2019-08-11 RX ADMIN — NALOXONE HYDROCHLORIDE 2 MG: 1 INJECTION PARENTERAL at 13:28

## 2019-08-11 RX ADMIN — Medication 25 MCG/HR: at 21:40

## 2019-08-11 RX ADMIN — LORAZEPAM 1 MG: 2 INJECTION INTRAMUSCULAR; INTRAVENOUS at 13:28

## 2019-08-11 RX ADMIN — Medication 2 G: at 17:49

## 2019-08-11 RX ADMIN — ROCURONIUM BROMIDE 80 MG: 10 SOLUTION INTRAVENOUS at 14:02

## 2019-08-11 RX ADMIN — PROPOFOL 10 MCG/KG/MIN: 10 INJECTION, EMULSION INTRAVENOUS at 14:09

## 2019-08-11 RX ADMIN — Medication 10 ML: at 21:20

## 2019-08-11 RX ADMIN — LACTULOSE 20 G: 20 SOLUTION ORAL at 21:44

## 2019-08-11 RX ADMIN — ETOMIDATE 20 MG: 2 INJECTION, SOLUTION INTRAVENOUS at 14:01

## 2019-08-11 RX ADMIN — SODIUM CHLORIDE 1000 ML: 9 INJECTION, SOLUTION INTRAVENOUS at 13:28

## 2019-08-11 RX ADMIN — HEPARIN SODIUM 5000 UNITS: 10000 INJECTION INTRAVENOUS; SUBCUTANEOUS at 21:43

## 2019-08-11 RX ADMIN — SODIUM CHLORIDE: 9 INJECTION, SOLUTION INTRAVENOUS at 15:45

## 2019-08-11 RX ADMIN — PROPOFOL 10 MCG/KG/MIN: 10 INJECTION, EMULSION INTRAVENOUS at 21:18

## 2019-08-11 RX ADMIN — SODIUM CHLORIDE 1000 ML: 9 INJECTION, SOLUTION INTRAVENOUS at 17:16

## 2019-08-11 ASSESSMENT — PULMONARY FUNCTION TESTS
PIF_VALUE: 15
PIF_VALUE: 17
PIF_VALUE: 17
PIF_VALUE: 19
PIF_VALUE: 14
PIF_VALUE: 17
PIF_VALUE: 19
PIF_VALUE: 19

## 2019-08-11 ASSESSMENT — LIFESTYLE VARIABLES: SMOKING_STATUS: 1

## 2019-08-11 ASSESSMENT — PAIN SCALES - GENERAL
PAINLEVEL_OUTOF10: 0
PAINLEVEL_OUTOF10: 0

## 2019-08-11 NOTE — CONSULTS
currently intubated and sedated. Right leg is very firm with noncompressible compartments. Unable to assess motor or sensation due to patient's intubation and sedation.   Foot is warm with cap refill    ASSESSMENT:  Right leg compartment syndrome    PLAN:  To OR stat for right leg decompressive fasciotomy, I&D, wcav application    Electronically signed by   Fallon Johnson DO  8/11/2019

## 2019-08-11 NOTE — ED NOTES
Bed: 10  Expected date:   Expected time:   Means of arrival:   Comments:  EMS     Nadia Phipps RN  08/11/19 8787

## 2019-08-11 NOTE — H&P
allergies. Social History:      TOBACCO:   reports that he has been smoking cigarettes. He has a 10.00 pack-year smoking history. He has never used smokeless tobacco.  ETOH:   reports that he drinks alcohol. Family History:       Reviewed in detail and negative for DM, CAD, Cancer, CVA. Positive as follows:        Problem Relation Age of Onset    Mental Illness Mother     High Blood Pressure Father        REVIEW OF SYSTEMS:   Could not be obtained    PHYSICAL EXAM:    BP (!) 138/95   Pulse 100   Temp 98.2 °F (36.8 °C) (Core)   Resp 18   Ht 5' 7\" (1.702 m)   Wt 170 lb (77.1 kg)   SpO2 97%   BMI 26.63 kg/m²     General appearance: In moderate apparent distress, appears stated age and cooperative. HEENT:  Normal cephalic, atraumatic without obvious deformity. Pupils equal, round, and reactive to light. Extra ocular muscles intact. Conjunctivae/corneas clear. Neck: Supple, with full range of motion. No jugular venous distention. Trachea midline. Respiratory:  Normal respiratory effort. Clear to auscultation, bilaterally without Rales/Wheezes/Rhonchi. Intubated  Cardiovascular: Tachycardic, regular rhythm with normal S1/S2 without murmurs, rubs or gallops. Abdomen: Soft, non-tender, non-distended with normal bowel sounds. : momin in place   Musculoskeletal:  No clubbing, cyanosis or edema bilaterally. Full range of motion without deformity. Firm RLE. Skin: Skin color, texture, turgor normal.  No rashes or lesions. Neurologic:  GCS 3.  Pt on propofol     Labs:     Recent Labs     08/11/19  1308   WBC 15.1*   HGB 16.8*   HCT 52.5        Recent Labs     08/11/19  1308 08/11/19  1719     --    K 4.7 4.52   CL 97*  --    CO2 18*  --    BUN 27*  --    CREATININE 2.0*  --    CALCIUM 8.9  --      Recent Labs     08/11/19  1308   *   ALT 90*   BILITOT 0.4   ALKPHOS 102     Recent Labs     08/11/19  1308   INR 1.0     Recent Labs     08/11/19  1308   CKTOTAL 20,989*   TROPONINI

## 2019-08-11 NOTE — ED NOTES
Treatment consent for surgery signed by son, Lee Annflower Kumari.      Deysi Broderick RN  08/11/19 [id number]

## 2019-08-11 NOTE — ANESTHESIA PRE PROCEDURE
4.7 08/11/2019    K 3.8 06/16/2019    CL 97 08/11/2019    CO2 18 08/11/2019    BUN 27 08/11/2019    CREATININE 2.0 08/11/2019    GFRAA 42 08/11/2019    LABGLOM 35 08/11/2019    GLUCOSE 124 08/11/2019    GLUCOSE 95 07/08/2011    PROT 8.6 08/11/2019    CALCIUM 8.9 08/11/2019    BILITOT 0.4 08/11/2019    ALKPHOS 102 08/11/2019     08/11/2019    ALT 90 08/11/2019       POC Tests: No results for input(s): POCGLU, POCNA, POCK, POCCL, POCBUN, POCHEMO, POCHCT in the last 72 hours. Coags:   Lab Results   Component Value Date    PROTIME 10.8 08/11/2019    INR 1.0 08/11/2019    APTT 32.9 06/13/2019       HCG (If Applicable): No results found for: PREGTESTUR, PREGSERUM, HCG, HCGQUANT     ABGs:   Lab Results   Component Value Date    PHART 7.399 07/06/2011    S2ONGGPJ 84.4 10/05/2010        Type & Screen (If Applicable):  No results found for: [facility]    Anesthesia Evaluation  Patient summary reviewed  Airway:        Comment: intubated   Dental:          Pulmonary: breath sounds clear to auscultation  (+) current smoker                          ROS comment: Acute hypoxic resp failure   Cardiovascular:            Rhythm: regular  Rate: normal           Beta Blocker:  Not on Beta Blocker         Neuro/Psych:   (+) psychiatric history:             ROS comment: Bipolar 1 disorder (HCC)  Schizophrenia (HCC)  Mood disorder (HCC)    Decreased mental status GI/Hepatic/Renal:   (+) hepatitis: C,           Endo/Other:    (+) : arthritis: OA., .                  ROS comment: polysubstance abuse Abdominal:           Vascular:                                        Anesthesia Plan      general     ASA 4 - emergent     (Pt obtunded. Hx from chart.)  Induction: inhalational.    MIPS: Postoperative opioids intended and Prophylactic antiemetics administered. Plan discussed with CRNA.                   Yazmin Hanson MD   8/11/2019

## 2019-08-11 NOTE — ANESTHESIA POSTPROCEDURE EVALUATION
Department of Anesthesiology  Postprocedure Note    Patient: Ruslan Simental  MRN: [id number]  YOB: 1965  Date of evaluation: 8/11/2019  Time:  6:37 PM     Procedure Summary     Date:  08/11/19 Room / Location:  [facility] OR  / [facility] OR    Anesthesia Start:  1743 Anesthesia Stop:      Procedure:  RIGHT LOWER LEG FASCIOTOMY WITH APPLICATION WOUND VAC (Right Leg Lower) Diagnosis:  (COMPARTMENT SYNDROME RIGHT LOWER LEG)    Surgeon:  Guillaume Vance DO Responsible Provider:  Romeo Brooks MD    Anesthesia Type:  general ASA Status:  4 - Emergent          Anesthesia Type: general    Jordan Phase I:      Jordan Phase II:      Last vitals: Reviewed and per EMR flowsheets.        Anesthesia Post Evaluation    Patient location during evaluation: ICU  Patient participation: complete - patient cannot participate  Level of consciousness: awake and alert  Airway patency: patent  Nausea & Vomiting: no nausea and no vomiting  Complications: no  Cardiovascular status: hemodynamically stable  Respiratory status: acceptable, intubated and ventilator  Hydration status: euvolemic

## 2019-08-12 ENCOUNTER — APPOINTMENT (OUTPATIENT)
Dept: GENERAL RADIOLOGY | Age: 54
DRG: 317 | End: 2019-08-12
Payer: COMMERCIAL

## 2019-08-12 LAB
AADO2: 131.3 MMHG
AADO2: 271.2 MMHG
AMMONIA: 40 UMOL/L (ref 16–60)
ANION GAP SERPL CALCULATED.3IONS-SCNC: 11 MMOL/L (ref 7–16)
ANION GAP SERPL CALCULATED.3IONS-SCNC: 4 MMOL/L (ref 7–16)
ANION GAP SERPL CALCULATED.3IONS-SCNC: 8 MMOL/L (ref 7–16)
B.E.: -4.3 MMOL/L (ref -3–3)
B.E.: -5.8 MMOL/L (ref -3–3)
BUN BLDV-MCNC: 14 MG/DL (ref 6–20)
BUN BLDV-MCNC: 21 MG/DL (ref 6–20)
BUN BLDV-MCNC: 23 MG/DL (ref 6–20)
CALCIUM SERPL-MCNC: 7.4 MG/DL (ref 8.6–10.2)
CALCIUM SERPL-MCNC: 7.4 MG/DL (ref 8.6–10.2)
CALCIUM SERPL-MCNC: 7.5 MG/DL (ref 8.6–10.2)
CHLORIDE BLD-SCNC: 108 MMOL/L (ref 98–107)
CHLORIDE BLD-SCNC: 108 MMOL/L (ref 98–107)
CHLORIDE BLD-SCNC: 109 MMOL/L (ref 98–107)
CK MB: >600 NG/ML (ref 0–7.7)
CK MB: >600 NG/ML (ref 0–7.7)
CO2: 23 MMOL/L (ref 22–29)
CO2: 25 MMOL/L (ref 22–29)
CO2: 26 MMOL/L (ref 22–29)
COHB: 0.4 % (ref 0–1.5)
COHB: 0.6 % (ref 0–1.5)
CREAT SERPL-MCNC: 1 MG/DL (ref 0.7–1.2)
CREAT SERPL-MCNC: 1.2 MG/DL (ref 0.7–1.2)
CREAT SERPL-MCNC: 1.2 MG/DL (ref 0.7–1.2)
CRITICAL: ABNORMAL
CRITICAL: ABNORMAL
DATE ANALYZED: ABNORMAL
DATE ANALYZED: ABNORMAL
DATE OF COLLECTION: ABNORMAL
DATE OF COLLECTION: ABNORMAL
EKG ATRIAL RATE: 92 BPM
EKG ATRIAL RATE: 95 BPM
EKG P AXIS: 69 DEGREES
EKG P AXIS: 75 DEGREES
EKG P-R INTERVAL: 156 MS
EKG P-R INTERVAL: 164 MS
EKG Q-T INTERVAL: 348 MS
EKG Q-T INTERVAL: 350 MS
EKG QRS DURATION: 78 MS
EKG QRS DURATION: 84 MS
EKG QTC CALCULATION (BAZETT): 432 MS
EKG QTC CALCULATION (BAZETT): 437 MS
EKG R AXIS: -2 DEGREES
EKG R AXIS: 20 DEGREES
EKG T AXIS: 14 DEGREES
EKG T AXIS: 25 DEGREES
EKG VENTRICULAR RATE: 92 BPM
EKG VENTRICULAR RATE: 95 BPM
FIO2: 40 %
FIO2: 70 %
GFR AFRICAN AMERICAN: >60
GFR NON-AFRICAN AMERICAN: >60 ML/MIN/1.73
GLUCOSE BLD-MCNC: 113 MG/DL (ref 74–99)
GLUCOSE BLD-MCNC: 117 MG/DL (ref 74–99)
GLUCOSE BLD-MCNC: 97 MG/DL (ref 74–99)
HCO3: 18 MMOL/L (ref 22–26)
HCO3: 20.4 MMOL/L (ref 22–26)
HHB: 0.9 % (ref 0–5)
HHB: 2.6 % (ref 0–5)
LAB: ABNORMAL
LAB: ABNORMAL
LACTIC ACID: 1.6 MMOL/L (ref 0.5–2.2)
Lab: ABNORMAL
Lab: ABNORMAL
MAGNESIUM: 2.2 MG/DL (ref 1.6–2.6)
METHB: 0.4 % (ref 0–1.5)
METHB: 0.5 % (ref 0–1.5)
MODE: ABNORMAL
MODE: AC
O2 CONTENT: 20.1 ML/DL
O2 CONTENT: 21.6 ML/DL
O2 SATURATION: 97.4 % (ref 92–98.5)
O2 SATURATION: 99.1 % (ref 92–98.5)
O2HB: 96.3 % (ref 94–97)
O2HB: 98.3 % (ref 94–97)
OPERATOR ID: 359
OPERATOR ID: 7490
PATIENT TEMP: 37 C
PATIENT TEMP: 37 C
PCO2: 31.2 MMHG (ref 35–45)
PCO2: 36.5 MMHG (ref 35–45)
PEEP/CPAP: 5 CMH2O
PEEP/CPAP: 5 CMH2O
PFO2: 2.53 MMHG/%
PFO2: 2.55 MMHG/%
PH BLOOD GAS: 7.37 (ref 7.35–7.45)
PH BLOOD GAS: 7.38 (ref 7.35–7.45)
PHOSPHORUS: 2.4 MG/DL (ref 2.5–4.5)
PO2: 101.9 MMHG (ref 60–100)
PO2: 176.9 MMHG (ref 60–100)
POTASSIUM REFLEX MAGNESIUM: 4.1 MMOL/L (ref 3.5–5)
POTASSIUM SERPL-SCNC: 4 MMOL/L (ref 3.5–5)
POTASSIUM SERPL-SCNC: 4 MMOL/L (ref 3.5–5)
PS: 8 CMH20
RI(T): 1.29
RI(T): 1.53
RR MECHANICAL: 20 B/MIN
SODIUM BLD-SCNC: 139 MMOL/L (ref 132–146)
SODIUM BLD-SCNC: 141 MMOL/L (ref 132–146)
SODIUM BLD-SCNC: 142 MMOL/L (ref 132–146)
SOURCE, BLOOD GAS: ABNORMAL
SOURCE, BLOOD GAS: ABNORMAL
THB: 14.8 G/DL (ref 11.5–16.5)
THB: 15.4 G/DL (ref 11.5–16.5)
TIME ANALYZED: 1107
TIME ANALYZED: 617
TOTAL CK: ABNORMAL U/L (ref 20–200)
TROPONIN: 0.04 NG/ML (ref 0–0.03)
VT MECHANICAL: 500 ML

## 2019-08-12 PROCEDURE — 82550 ASSAY OF CK (CPK): CPT

## 2019-08-12 PROCEDURE — 97166 OT EVAL MOD COMPLEX 45 MIN: CPT

## 2019-08-12 PROCEDURE — 82553 CREATINE MB FRACTION: CPT

## 2019-08-12 PROCEDURE — 97162 PT EVAL MOD COMPLEX 30 MIN: CPT

## 2019-08-12 PROCEDURE — 71045 X-RAY EXAM CHEST 1 VIEW: CPT

## 2019-08-12 PROCEDURE — 6360000002 HC RX W HCPCS: Performed by: INTERNAL MEDICINE

## 2019-08-12 PROCEDURE — 87040 BLOOD CULTURE FOR BACTERIA: CPT

## 2019-08-12 PROCEDURE — 2580000003 HC RX 258: Performed by: INTERNAL MEDICINE

## 2019-08-12 PROCEDURE — 97530 THERAPEUTIC ACTIVITIES: CPT

## 2019-08-12 PROCEDURE — 94003 VENT MGMT INPAT SUBQ DAY: CPT

## 2019-08-12 PROCEDURE — 94799 UNLISTED PULMONARY SVC/PX: CPT

## 2019-08-12 PROCEDURE — C9113 INJ PANTOPRAZOLE SODIUM, VIA: HCPCS | Performed by: INTERNAL MEDICINE

## 2019-08-12 PROCEDURE — 97535 SELF CARE MNGMENT TRAINING: CPT

## 2019-08-12 PROCEDURE — 93010 ELECTROCARDIOGRAM REPORT: CPT | Performed by: INTERNAL MEDICINE

## 2019-08-12 PROCEDURE — 2500000003 HC RX 250 WO HCPCS: Performed by: INTERNAL MEDICINE

## 2019-08-12 PROCEDURE — 83735 ASSAY OF MAGNESIUM: CPT

## 2019-08-12 PROCEDURE — 6370000000 HC RX 637 (ALT 250 FOR IP): Performed by: INTERNAL MEDICINE

## 2019-08-12 PROCEDURE — 0KNS0ZZ RELEASE RIGHT LOWER LEG MUSCLE, OPEN APPROACH: ICD-10-PCS | Performed by: ORTHOPAEDIC SURGERY

## 2019-08-12 PROCEDURE — 82140 ASSAY OF AMMONIA: CPT

## 2019-08-12 PROCEDURE — 80048 BASIC METABOLIC PNL TOTAL CA: CPT

## 2019-08-12 PROCEDURE — 93005 ELECTROCARDIOGRAM TRACING: CPT | Performed by: INTERNAL MEDICINE

## 2019-08-12 PROCEDURE — 36415 COLL VENOUS BLD VENIPUNCTURE: CPT

## 2019-08-12 PROCEDURE — 84100 ASSAY OF PHOSPHORUS: CPT

## 2019-08-12 PROCEDURE — 2000000000 HC ICU R&B

## 2019-08-12 PROCEDURE — 87206 SMEAR FLUORESCENT/ACID STAI: CPT

## 2019-08-12 PROCEDURE — 83605 ASSAY OF LACTIC ACID: CPT

## 2019-08-12 PROCEDURE — 82805 BLOOD GASES W/O2 SATURATION: CPT

## 2019-08-12 PROCEDURE — 6370000000 HC RX 637 (ALT 250 FOR IP): Performed by: STUDENT IN AN ORGANIZED HEALTH CARE EDUCATION/TRAINING PROGRAM

## 2019-08-12 PROCEDURE — 84484 ASSAY OF TROPONIN QUANT: CPT

## 2019-08-12 PROCEDURE — 2580000003 HC RX 258: Performed by: STUDENT IN AN ORGANIZED HEALTH CARE EDUCATION/TRAINING PROGRAM

## 2019-08-12 PROCEDURE — 6360000002 HC RX W HCPCS: Performed by: STUDENT IN AN ORGANIZED HEALTH CARE EDUCATION/TRAINING PROGRAM

## 2019-08-12 PROCEDURE — 2700000000 HC OXYGEN THERAPY PER DAY

## 2019-08-12 PROCEDURE — 87070 CULTURE OTHR SPECIMN AEROBIC: CPT

## 2019-08-12 RX ORDER — FENTANYL CITRATE 50 UG/ML
25 INJECTION, SOLUTION INTRAMUSCULAR; INTRAVENOUS
Status: DISCONTINUED | OUTPATIENT
Start: 2019-08-12 | End: 2019-08-27

## 2019-08-12 RX ORDER — SODIUM CHLORIDE 9 MG/ML
INJECTION, SOLUTION INTRAVENOUS CONTINUOUS
Status: DISCONTINUED | OUTPATIENT
Start: 2019-08-12 | End: 2019-08-12

## 2019-08-12 RX ORDER — PANTOPRAZOLE SODIUM 40 MG/10ML
40 INJECTION, POWDER, LYOPHILIZED, FOR SOLUTION INTRAVENOUS DAILY
Status: DISCONTINUED | OUTPATIENT
Start: 2019-08-12 | End: 2019-08-21

## 2019-08-12 RX ORDER — SODIUM CHLORIDE, SODIUM LACTATE, POTASSIUM CHLORIDE, CALCIUM CHLORIDE 600; 310; 30; 20 MG/100ML; MG/100ML; MG/100ML; MG/100ML
INJECTION, SOLUTION INTRAVENOUS CONTINUOUS
Status: DISCONTINUED | OUTPATIENT
Start: 2019-08-12 | End: 2019-08-15

## 2019-08-12 RX ORDER — 0.9 % SODIUM CHLORIDE 0.9 %
10 VIAL (ML) INJECTION DAILY
Status: DISCONTINUED | OUTPATIENT
Start: 2019-08-12 | End: 2019-08-22

## 2019-08-12 RX ADMIN — HEPARIN SODIUM 5000 UNITS: 10000 INJECTION INTRAVENOUS; SUBCUTANEOUS at 19:56

## 2019-08-12 RX ADMIN — SODIUM CHLORIDE: 9 INJECTION, SOLUTION INTRAVENOUS at 01:07

## 2019-08-12 RX ADMIN — FENTANYL CITRATE 25 MCG: 0.05 INJECTION, SOLUTION INTRAMUSCULAR; INTRAVENOUS at 19:55

## 2019-08-12 RX ADMIN — CEFAZOLIN SODIUM 2 G: 10 INJECTION, POWDER, FOR SOLUTION INTRAVENOUS at 13:01

## 2019-08-12 RX ADMIN — SODIUM CHLORIDE: 9 INJECTION, SOLUTION INTRAVENOUS at 09:12

## 2019-08-12 RX ADMIN — LACTULOSE 20 G: 20 SOLUTION ORAL at 09:14

## 2019-08-12 RX ADMIN — DOCUSATE SODIUM 100 MG: 100 CAPSULE, LIQUID FILLED ORAL at 21:31

## 2019-08-12 RX ADMIN — HEPARIN SODIUM 5000 UNITS: 10000 INJECTION INTRAVENOUS; SUBCUTANEOUS at 09:15

## 2019-08-12 RX ADMIN — VANCOMYCIN HYDROCHLORIDE 1.5 G: 10 INJECTION, POWDER, LYOPHILIZED, FOR SOLUTION INTRAVENOUS at 06:00

## 2019-08-12 RX ADMIN — PROPOFOL 10 MCG/KG/MIN: 10 INJECTION, EMULSION INTRAVENOUS at 06:18

## 2019-08-12 RX ADMIN — SODIUM BICARBONATE: 84 INJECTION, SOLUTION INTRAVENOUS at 06:15

## 2019-08-12 RX ADMIN — FENTANYL CITRATE 25 MCG: 0.05 INJECTION, SOLUTION INTRAMUSCULAR; INTRAVENOUS at 14:49

## 2019-08-12 RX ADMIN — LACTULOSE 20 G: 20 SOLUTION ORAL at 14:48

## 2019-08-12 RX ADMIN — CEFAZOLIN SODIUM 2 G: 10 INJECTION, POWDER, FOR SOLUTION INTRAVENOUS at 02:30

## 2019-08-12 RX ADMIN — PANTOPRAZOLE SODIUM 40 MG: 40 INJECTION, POWDER, FOR SOLUTION INTRAVENOUS at 09:14

## 2019-08-12 RX ADMIN — LACTULOSE 20 G: 20 SOLUTION ORAL at 21:31

## 2019-08-12 RX ADMIN — Medication 10 ML: at 19:56

## 2019-08-12 RX ADMIN — HEPARIN SODIUM 5000 UNITS: 10000 INJECTION INTRAVENOUS; SUBCUTANEOUS at 14:48

## 2019-08-12 RX ADMIN — SODIUM CHLORIDE: 9 INJECTION, SOLUTION INTRAVENOUS at 19:54

## 2019-08-12 RX ADMIN — Medication 10 ML: at 21:31

## 2019-08-12 ASSESSMENT — PULMONARY FUNCTION TESTS
PIF_VALUE: 19
PIF_VALUE: 20
PIF_VALUE: 19
PIF_VALUE: 17
PIF_VALUE: 13
PIF_VALUE: 17
PIF_VALUE: 14
PIF_VALUE: 17
PIF_VALUE: 23
PIF_VALUE: 17
PIF_VALUE: 11
PIF_VALUE: 14
PIF_VALUE: 20
PIF_VALUE: 22
PIF_VALUE: 21
PIF_VALUE: 21

## 2019-08-12 ASSESSMENT — PAIN SCALES - GENERAL
PAINLEVEL_OUTOF10: 9
PAINLEVEL_OUTOF10: 0
PAINLEVEL_OUTOF10: 10
PAINLEVEL_OUTOF10: 0

## 2019-08-12 NOTE — PROGRESS NOTES
preservation of alignment and joint spacing throughout. Degenerative changes of the cervical spine are associated with mild kyphotic curvature between the C5 and C7 levels. Individual vertebrae and facets show no evidence of acute traumatic disruption or bony displacement. There is no paraspinous soft tissue swelling. Pulmonary apical region show no acute findings, and at the skull base, mastoid sinuses and middle ear cavities appear normally aerated. Orotracheal and orogastric tubes are present. They extend beneath the lower margin of the study, but show very prominent tortuosity in the jimmy and hypopharynx of the orogastric tube. IMPRESSION:  No evidence of acute cervical bony or adjacent soft tissue traumatic findings. The orogastric tube is extremely redundant in the neck region. Note: Emergency department was notified at the time of this dictation of the redundant orogastric tube appearance by myself. Xr Chest Portable    Result Date: 8/11/2019  Location:200 Exam: XR CHEST PORTABLE Indications: Altered mental status Findings: A portable AP supine view of the chest was obtained and compared to the previous exam of 6/14/2019. Distal tip of endotracheal tube is located approximately 2.0 cm above the level of the tracheal bifurcation. There is elevation of the right hemidiaphragm, which has increased since previous study. There is mild patchy increased density within the right suprahilar region centrally and within the right infrahilar region, extending to the medial right lung base. There is increased interstitial prominence within the left infrahilar region, extending into the medial left lung base. No pleural effusion or pneumothorax is seen bilaterally. Cardiac silhouette and osseous structures are not significantly changed, as visualized. 1. Distal tip of endotracheal tube is located of proximal tracheal bifurcation.  2. Interval increase in amount of elevation of right hemidiaphragm, suggesting interval status post extubation  · followprocal, resp cx , viral panel, blood cx   · ID consulted ,recommeded leucocytosis reactive to follow of antibiotics  · Monitor pulses on bilateral lower extremities  · Monitor renal function, strict I and O  · continue NS at 200 ml/hr  · Trend CK levels q 6 hr, UO goal of 200 cc/hr  · orthopedic following , plan for further I and D possible this week         PT/OT evaluation: pending   DVT prophylaxis/ GI prophylaxis: heparin / protonix  Disposition:  MICU     Navin Jean Baptiste MD, PGY-2   Internal Medicine Resident  Attending physician: Dr. Fina Vail     I personally saw, examined and provided care for the patient. Radiographs, labs and medication list were reviewed by me independently. I spoke with bedside nursing, therapists and consultants. Critical care services and times documented are independent of procedures and multidisciplinary rounds with Residents. Additionally comprehensive, multidisciplinary rounds were conducted with the MICU team. The case was discussed in detail and plans for care were established. Review of Residents documentation was conducted and revisions were made as appropriate. I agree with the above documented exam, problem list and plan of care with the following additions:    Please see my addendum to the ICU consult  Seen today on rounds.     Kaleb More MD

## 2019-08-12 NOTE — PROGRESS NOTES
Spontaneous Parameters performed    VT = 588 ml  f = 11  B/M  Ve = 6.57 L/M  NIF = -25  cmH2O  VC = na L  RSBI = 18      Performed by Margret Anderson

## 2019-08-12 NOTE — PROGRESS NOTES
Sent message on perfect serve for Dr. Santino Dumont     Left message with Dr. Aubrie Merino answering service

## 2019-08-12 NOTE — PROGRESS NOTES
patient left with all devices within reach, all lines and tubes intact; PCA present. Pt required cues and education as noted above for safe facilitation and completion of tasks. During functional activites and ADLs pt educated on proper hand placement, safety techniques, RLE NWB, sequencing, posture and energy conservation/pacing/breathing techniques. Pt additionally educated on OT role, importance of EOB/OOB activity and completing ADL tasks daily as IND as possible to aide in recovery process, fall risk precautions/call light use and proper positioning in bed. Therapist provided skilled monitoring of HR, O2 saturation, blood pressure and patients response during treatment session. Prior to and at the end of session, environmental modifications/line management completed for patients safety and efficiency of treatment session. Non-pharmacologic treatment for ICU delirium initiated this date. Environmental and sensory modifications assessed and implemented to decrease pts internal distraction caused by delirium, and improve overall mentation. Pt oriented to date, time, time of day, place, and situation as well as provided with visual and auditory stimuli in order to improve cognition and combat effects of ICU delirium. Eval Complexity:   · Moderate Complexity  · History: Expanded review of medical records and additional review of physical, cognitive, or psychosocial history related to current functional performance  · Exam: 3+ performance deficits  · Assistance/Modification: Min/mod assistance or modifications required to perform tasks. May have comorbidities that affect occupational performance.     Assessment of current deficits:  Functional mobility [x]  ADLs [x] Strength [x]  Cognition [x]  Functional transfers  [x] IADLs [x] Safety Awareness [x]  Endurance [x]  Fine Motor Coordination [x] Balance [x] Vision/perception [x] Sensation [x]   Gross Motor Coordination [] ROM [] Delirium [x]

## 2019-08-12 NOTE — CONSULTS
swelling, deformity or tenderness  Neurologic: intubated and sedated       Mechanical Ventilation:   Mode: A/C    TV: 500 ml RR: 20  PEEP 5 cmH20  FiO2 90%     ABG:     Lab Results   Component Value Date    PHART 7.399 07/06/2011    PH 7.214 08/11/2019    PH 7.279 10/05/2010    PCO2 56.4 08/11/2019    PO2 106.4 08/11/2019    HCO3 22.3 08/11/2019    BE -6.4 08/11/2019    THB 16.0 08/11/2019    U8LSKCLS 84.4 10/05/2010    O2SAT 97.4 08/11/2019     Labs and Imaging Studies   Basic Labs  CBC with Differential:    Lab Results   Component Value Date    WBC 15.1 08/11/2019    RBC 5.36 08/11/2019    HGB 16.8 08/11/2019    HCT 52.5 08/11/2019     08/11/2019    MCV 97.9 08/11/2019    MCH 31.3 08/11/2019    MCHC 32.0 08/11/2019    RDW 13.7 08/11/2019    SEGSPCT 71 10/20/2012    BANDSPCT 11 09/04/2014    METASPCT 1 09/04/2014    LYMPHOPCT 5.4 08/11/2019    MONOPCT 6.0 08/11/2019    EOSPCT 3 10/05/2010    BASOPCT 0.3 08/11/2019    MONOSABS 0.90 08/11/2019    LYMPHSABS 0.82 08/11/2019    EOSABS 0.01 08/11/2019    BASOSABS 0.04 08/11/2019     CMP:    Lab Results   Component Value Date     08/11/2019    K 4.52 08/11/2019    K 3.8 06/16/2019    CL 97 08/11/2019    CO2 18 08/11/2019    BUN 27 08/11/2019    CREATININE 2.0 08/11/2019    GFRAA 42 08/11/2019    LABGLOM 35 08/11/2019    GLUCOSE 124 08/11/2019    GLUCOSE 95 07/08/2011    PROT 8.6 08/11/2019    LABALBU 4.7 08/11/2019    LABALBU 3.6 07/08/2011    CALCIUM 8.9 08/11/2019    BILITOT 0.4 08/11/2019    ALKPHOS 102 08/11/2019     08/11/2019    ALT 90 08/11/2019     Hepatic Function Panel:    Lab Results   Component Value Date    ALKPHOS 102 08/11/2019    ALT 90 08/11/2019     08/11/2019    PROT 8.6 08/11/2019    BILITOT 0.4 08/11/2019    BILIDIR <0.2 06/13/2019    IBILI see below 06/13/2019    LABALBU 4.7 08/11/2019    LABALBU 3.6 07/08/2011     Magnesium:    Lab Results   Component Value Date    MG 2.3 06/15/2019     Phosphorus:    Lab Results normal. There is no evidence of hemorrhage or mass effect, and no pathologic extra-axial fluid collections are noted. There is no evidence of skeletal trauma or sinus opacification. 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 Extracranial soft tissues show no definite evidence of acute pathology. Subtle soft tissue prominence is noted over the left forehead, and is of questionable significance Mastoid air cells and middle ear cavities are normally aerated. Possible extracranial soft tissue swelling over the left for head. No evidence of calvarial trauma or acute intracranial hemorrhage, edema, mass effect, or pathologic extra-axial fluid collection. NONCONTRAST CT CERVICAL SPINE: NUMBER OF IMAGES \ views:  65 INDICATION:  Fall unresponsive, drug overdose COMPARISON: None TECHNIQUE: Axial images were extended through the cervical spine without intravenous contrast, and reformatted multiplanar images were provided for review in soft tissue and bone window settings. Low-dose CT acquisition technique included one of following options; 1 . Automated exposure control, 2. Adjustment of MA and or KV according to patient's size or 3. Use of iterative reconstruction. FINDINGS: Bony structures are intact with relative preservation of alignment and joint spacing throughout. Degenerative changes of the cervical spine are associated with mild kyphotic curvature between the C5 and C7 levels. Individual vertebrae and facets show no evidence of acute traumatic disruption or bony displacement. There is no paraspinous soft tissue swelling. Pulmonary apical region show no acute findings, and at the skull base, mastoid sinuses and middle ear cavities appear normally aerated. Orotracheal and orogastric tubes are present. They extend beneath the lower margin of the study, but show very prominent tortuosity in the jimmy and hypopharynx of the orogastric tube.  IMPRESSION:  No evidence of 680 INDICATION:  Fall unresponsive, drug overdose COMPARISON: None TECHNIQUE: Axial images were extended through the cervical spine without intravenous contrast, and reformatted multiplanar images were provided for review in soft tissue and bone window settings. Low-dose CT acquisition technique included one of following options; 1 . Automated exposure control, 2. Adjustment of MA and or KV according to patient's size or 3. Use of iterative reconstruction. FINDINGS: Bony structures are intact with relative preservation of alignment and joint spacing throughout. Degenerative changes of the cervical spine are associated with mild kyphotic curvature between the C5 and C7 levels. Individual vertebrae and facets show no evidence of acute traumatic disruption or bony displacement. There is no paraspinous soft tissue swelling. Pulmonary apical region show no acute findings, and at the skull base, mastoid sinuses and middle ear cavities appear normally aerated. Orotracheal and orogastric tubes are present. They extend beneath the lower margin of the study, but show very prominent tortuosity in the jimmy and hypopharynx of the orogastric tube. IMPRESSION:  No evidence of acute cervical bony or adjacent soft tissue traumatic findings. The orogastric tube is extremely redundant in the neck region. Note: Emergency department was notified at the time of this dictation of the redundant orogastric tube appearance by myself. Xr Chest Portable    Result Date: 8/11/2019  Location:200 Exam: XR CHEST PORTABLE Indications: Altered mental status Findings: A portable AP supine view of the chest was obtained and compared to the previous exam of 6/14/2019. Distal tip of endotracheal tube is located approximately 2.0 cm above the level of the tracheal bifurcation. There is elevation of the right hemidiaphragm, which has increased since previous study.  There is mild patchy increased density within the right suprahilar region centrally and

## 2019-08-12 NOTE — CONSULTS
kg/m²       General Appearance:    Awake, intubated    Head:    Normocephalic, atraumatic   Eyes:    No pallor, no icterus,   Ears:    No obvious deformity or drainage.    Nose:   No nasal drainage   Throat:   Mucosa moist, no oral thrush   Neck:   Supple, no lymphadenopathy   Back:     no CVA tenderness   Lungs:     Clear to auscultation bilaterally, no wheeze    Heart:    Regular rate and rhythm, no murmur, rub or gallop   Abdomen:     Soft, non-tender, bowel sounds present    Extremities:   No edema, no cyanosis ,no open wound,no erythema, no     tenderness   Pulses:   Dorsalis pedis palpable    Skin:   no rashes or lesions     CBC with Differential:      Lab Results   Component Value Date    WBC 15.1 08/11/2019    RBC 5.36 08/11/2019    HGB 16.8 08/11/2019    HCT 52.5 08/11/2019     08/11/2019    MCV 97.9 08/11/2019    MCH 31.3 08/11/2019    MCHC 32.0 08/11/2019    RDW 13.7 08/11/2019    SEGSPCT 71 10/20/2012    BANDSPCT 11 09/04/2014    METASPCT 1 09/04/2014    LYMPHOPCT 5.4 08/11/2019    MONOPCT 6.0 08/11/2019    EOSPCT 3 10/05/2010    BASOPCT 0.3 08/11/2019    MONOSABS 0.90 08/11/2019    LYMPHSABS 0.82 08/11/2019    EOSABS 0.01 08/11/2019    BASOSABS 0.04 08/11/2019       CMP     Lab Results   Component Value Date     08/11/2019    K 4.52 08/11/2019    K 3.8 06/16/2019    CL 97 08/11/2019    CO2 18 08/11/2019    BUN 27 08/11/2019    CREATININE 2.0 08/11/2019    GFRAA 42 08/11/2019    LABGLOM 35 08/11/2019    GLUCOSE 124 08/11/2019    GLUCOSE 95 07/08/2011    PROT 8.6 08/11/2019    LABALBU 4.7 08/11/2019    LABALBU 3.6 07/08/2011    CALCIUM 8.9 08/11/2019    BILITOT 0.4 08/11/2019    ALKPHOS 102 08/11/2019     08/11/2019    ALT 90 08/11/2019         Hepatic Function Panel:    Lab Results   Component Value Date    ALKPHOS 102 08/11/2019    ALT 90 08/11/2019     08/11/2019    PROT 8.6 08/11/2019    BILITOT 0.4 08/11/2019    BILIDIR <0.2 06/13/2019    IBILI see below 06/13/2019

## 2019-08-12 NOTE — CONSULTS
Continuous  fentaNYL (SUBLIMAZE) injection 25 mcg, 25 mcg, Intravenous, Q2H PRN  docusate sodium (COLACE) capsule 100 mg, 100 mg, Oral, BID  ondansetron (ZOFRAN) injection 4 mg, 4 mg, Intravenous, Q6H PRN  ceFAZolin (ANCEF) 2 g in dextrose 5 % 50 mL IVPB, 2 g, Intravenous, Q8H  lactulose (CHRONULAC) 10 GM/15ML solution 20 g, 20 g, Oral, TID  sodium chloride flush 0.9 % injection 10 mL, 10 mL, Intravenous, 2 times per day  sodium chloride flush 0.9 % injection 10 mL, 10 mL, Intravenous, PRN  magnesium hydroxide (MILK OF MAGNESIA) 400 MG/5ML suspension 30 mL, 30 mL, Oral, Daily PRN  heparin (porcine) injection 5,000 Units, 5,000 Units, Subcutaneous, TID  Allergies:  Patient has no known allergies. Social History:    Social History     Socioeconomic History    Marital status:      Spouse name: Not on file    Number of children: Not on file    Years of education: Not on file    Highest education level: Not on file   Occupational History    Not on file   Social Needs    Financial resource strain: Not on file    Food insecurity:     Worry: Not on file     Inability: Not on file    Transportation needs:     Medical: Not on file     Non-medical: Not on file   Tobacco Use    Smoking status: Current Every Day Smoker     Packs/day: 0.50     Years: 20.00     Pack years: 10.00     Types: Cigarettes    Smokeless tobacco: Never Used   Substance and Sexual Activity    Alcohol use: Yes     Comment: 6-12 beers daily    Drug use: Yes     Types:  Other-see comments     Comment: pt states friend he was with must have slipped some heroin in his drink and denies taking any drugs    Sexual activity: Yes     Partners: Female   Lifestyle    Physical activity:     Days per week: Not on file     Minutes per session: Not on file    Stress: Not on file   Relationships    Social connections:     Talks on phone: Not on file     Gets together: Not on file     Attends [employer] service: Not on file     Active member of club resolved  4. Rhabdomyolysis, due to prolonged decubitus, drugs; with compartment syndrome  5. Acute Encephalopathy 2/2 polysubstance overdose  6. Compartment Leg Syndrome of Right Leg, s/p fasciotomy  7. Transaminitis 2/2 shock liver in the setting of Hep C  8. HCV  9. Nutrition n.p.o. PLAN:    · Continue NS IV  · Continue to monitor renal function  · Continue to monitor acid-base status  · ID is following for HCV  · Monitor CPKs    Thank you very much Dr. Génesis Hackett  for allowing us to participate in the care of Babita Butcher.        Electronically signed by Sidney Oliva on 8/12/2019 at 1:18 PM

## 2019-08-13 ENCOUNTER — APPOINTMENT (OUTPATIENT)
Dept: GENERAL RADIOLOGY | Age: 54
DRG: 317 | End: 2019-08-13
Payer: COMMERCIAL

## 2019-08-13 LAB
AMORPHOUS: ABNORMAL
ANION GAP SERPL CALCULATED.3IONS-SCNC: 4 MMOL/L (ref 7–16)
BACTERIA: ABNORMAL /HPF
BASOPHILS ABSOLUTE: 0.01 E9/L (ref 0–0.2)
BASOPHILS RELATIVE PERCENT: 0.1 % (ref 0–2)
BILIRUBIN URINE: NEGATIVE
BLOOD, URINE: ABNORMAL
BUN BLDV-MCNC: 10 MG/DL (ref 6–20)
CALCIUM SERPL-MCNC: 7.5 MG/DL (ref 8.6–10.2)
CHLORIDE BLD-SCNC: 107 MMOL/L (ref 98–107)
CLARITY: CLEAR
CO2: 27 MMOL/L (ref 22–29)
COLOR: YELLOW
CREAT SERPL-MCNC: 0.9 MG/DL (ref 0.7–1.2)
EKG ATRIAL RATE: 128 BPM
EKG P AXIS: 83 DEGREES
EKG P-R INTERVAL: 154 MS
EKG Q-T INTERVAL: 262 MS
EKG QRS DURATION: 108 MS
EKG QTC CALCULATION (BAZETT): 382 MS
EKG R AXIS: 29 DEGREES
EKG T AXIS: 32 DEGREES
EKG VENTRICULAR RATE: 128 BPM
EOSINOPHILS ABSOLUTE: 0.01 E9/L (ref 0.05–0.5)
EOSINOPHILS RELATIVE PERCENT: 0.1 % (ref 0–6)
GFR AFRICAN AMERICAN: >60
GFR NON-AFRICAN AMERICAN: >60 ML/MIN/1.73
GLUCOSE BLD-MCNC: 97 MG/DL (ref 74–99)
GLUCOSE URINE: NEGATIVE MG/DL
HCT VFR BLD CALC: 35.3 % (ref 37–54)
HEMOGLOBIN: 11.9 G/DL (ref 12.5–16.5)
IMMATURE GRANULOCYTES #: 0.04 E9/L
IMMATURE GRANULOCYTES %: 0.4 % (ref 0–5)
KETONES, URINE: NEGATIVE MG/DL
LEUKOCYTE ESTERASE, URINE: NEGATIVE
LYMPHOCYTES ABSOLUTE: 1.39 E9/L (ref 1.5–4)
LYMPHOCYTES RELATIVE PERCENT: 14.8 % (ref 20–42)
MCH RBC QN AUTO: 32 PG (ref 26–35)
MCHC RBC AUTO-ENTMCNC: 33.7 % (ref 32–34.5)
MCV RBC AUTO: 94.9 FL (ref 80–99.9)
MONOCYTES ABSOLUTE: 0.81 E9/L (ref 0.1–0.95)
MONOCYTES RELATIVE PERCENT: 8.6 % (ref 2–12)
MRSA CULTURE ONLY: NORMAL
NEUTROPHILS ABSOLUTE: 7.14 E9/L (ref 1.8–7.3)
NEUTROPHILS RELATIVE PERCENT: 76 % (ref 43–80)
NITRITE, URINE: NEGATIVE
PDW BLD-RTO: 14.6 FL (ref 11.5–15)
PH UA: 5.5 (ref 5–9)
PLATELET # BLD: 165 E9/L (ref 130–450)
PMV BLD AUTO: 10.2 FL (ref 7–12)
POTASSIUM REFLEX MAGNESIUM: 3.9 MMOL/L (ref 3.5–5)
PROTEIN UA: 30 MG/DL
RBC # BLD: 3.72 E12/L (ref 3.8–5.8)
RBC UA: ABNORMAL /HPF (ref 0–2)
SODIUM BLD-SCNC: 138 MMOL/L (ref 132–146)
SPECIFIC GRAVITY UA: 1.01 (ref 1–1.03)
TOTAL CK: ABNORMAL U/L (ref 20–200)
UROBILINOGEN, URINE: 0.2 E.U./DL
WBC # BLD: 9.4 E9/L (ref 4.5–11.5)
WBC UA: ABNORMAL /HPF (ref 0–5)

## 2019-08-13 PROCEDURE — 2000000000 HC ICU R&B

## 2019-08-13 PROCEDURE — 81001 URINALYSIS AUTO W/SCOPE: CPT

## 2019-08-13 PROCEDURE — 97530 THERAPEUTIC ACTIVITIES: CPT

## 2019-08-13 PROCEDURE — 6370000000 HC RX 637 (ALT 250 FOR IP): Performed by: STUDENT IN AN ORGANIZED HEALTH CARE EDUCATION/TRAINING PROGRAM

## 2019-08-13 PROCEDURE — 97535 SELF CARE MNGMENT TRAINING: CPT

## 2019-08-13 PROCEDURE — 85025 COMPLETE CBC W/AUTO DIFF WBC: CPT

## 2019-08-13 PROCEDURE — 2580000003 HC RX 258: Performed by: INTERNAL MEDICINE

## 2019-08-13 PROCEDURE — C9113 INJ PANTOPRAZOLE SODIUM, VIA: HCPCS | Performed by: INTERNAL MEDICINE

## 2019-08-13 PROCEDURE — 87040 BLOOD CULTURE FOR BACTERIA: CPT

## 2019-08-13 PROCEDURE — 94640 AIRWAY INHALATION TREATMENT: CPT

## 2019-08-13 PROCEDURE — 80048 BASIC METABOLIC PNL TOTAL CA: CPT

## 2019-08-13 PROCEDURE — 6370000000 HC RX 637 (ALT 250 FOR IP): Performed by: INTERNAL MEDICINE

## 2019-08-13 PROCEDURE — 82550 ASSAY OF CK (CPK): CPT

## 2019-08-13 PROCEDURE — 6360000002 HC RX W HCPCS: Performed by: INTERNAL MEDICINE

## 2019-08-13 PROCEDURE — 36415 COLL VENOUS BLD VENIPUNCTURE: CPT

## 2019-08-13 PROCEDURE — 71045 X-RAY EXAM CHEST 1 VIEW: CPT

## 2019-08-13 PROCEDURE — 93010 ELECTROCARDIOGRAM REPORT: CPT | Performed by: INTERNAL MEDICINE

## 2019-08-13 PROCEDURE — 99253 IP/OBS CNSLTJ NEW/EST LOW 45: CPT | Performed by: NURSE PRACTITIONER

## 2019-08-13 PROCEDURE — 2700000000 HC OXYGEN THERAPY PER DAY

## 2019-08-13 RX ORDER — IPRATROPIUM BROMIDE AND ALBUTEROL SULFATE 2.5; .5 MG/3ML; MG/3ML
1 SOLUTION RESPIRATORY (INHALATION)
Status: DISCONTINUED | OUTPATIENT
Start: 2019-08-13 | End: 2019-08-28 | Stop reason: HOSPADM

## 2019-08-13 RX ORDER — ACETAMINOPHEN 325 MG/1
650 TABLET ORAL EVERY 4 HOURS PRN
Status: DISCONTINUED | OUTPATIENT
Start: 2019-08-13 | End: 2019-08-14

## 2019-08-13 RX ADMIN — PIPERACILLIN SODIUM AND TAZOBACTAM SODIUM 3.38 G: 3; .375 INJECTION, POWDER, LYOPHILIZED, FOR SOLUTION INTRAVENOUS at 12:51

## 2019-08-13 RX ADMIN — PANTOPRAZOLE SODIUM 40 MG: 40 INJECTION, POWDER, FOR SOLUTION INTRAVENOUS at 08:37

## 2019-08-13 RX ADMIN — Medication 10 ML: at 21:16

## 2019-08-13 RX ADMIN — PIPERACILLIN SODIUM AND TAZOBACTAM SODIUM 3.38 G: 3; .375 INJECTION, POWDER, LYOPHILIZED, FOR SOLUTION INTRAVENOUS at 19:52

## 2019-08-13 RX ADMIN — DOCUSATE SODIUM 100 MG: 100 CAPSULE, LIQUID FILLED ORAL at 21:13

## 2019-08-13 RX ADMIN — SODIUM CHLORIDE, POTASSIUM CHLORIDE, SODIUM LACTATE AND CALCIUM CHLORIDE: 600; 310; 30; 20 INJECTION, SOLUTION INTRAVENOUS at 11:25

## 2019-08-13 RX ADMIN — Medication 10 ML: at 08:32

## 2019-08-13 RX ADMIN — SODIUM CHLORIDE, POTASSIUM CHLORIDE, SODIUM LACTATE AND CALCIUM CHLORIDE: 600; 310; 30; 20 INJECTION, SOLUTION INTRAVENOUS at 00:38

## 2019-08-13 RX ADMIN — Medication 10 ML: at 08:38

## 2019-08-13 RX ADMIN — SODIUM CHLORIDE, POTASSIUM CHLORIDE, SODIUM LACTATE AND CALCIUM CHLORIDE: 600; 310; 30; 20 INJECTION, SOLUTION INTRAVENOUS at 16:16

## 2019-08-13 RX ADMIN — Medication 10 ML: at 16:16

## 2019-08-13 RX ADMIN — ACETAMINOPHEN 650 MG: 325 TABLET, FILM COATED ORAL at 21:13

## 2019-08-13 RX ADMIN — FENTANYL CITRATE 25 MCG: 0.05 INJECTION, SOLUTION INTRAMUSCULAR; INTRAVENOUS at 21:27

## 2019-08-13 RX ADMIN — IPRATROPIUM BROMIDE AND ALBUTEROL SULFATE 1 AMPULE: .5; 3 SOLUTION RESPIRATORY (INHALATION) at 20:22

## 2019-08-13 RX ADMIN — SODIUM CHLORIDE, POTASSIUM CHLORIDE, SODIUM LACTATE AND CALCIUM CHLORIDE: 600; 310; 30; 20 INJECTION, SOLUTION INTRAVENOUS at 08:31

## 2019-08-13 RX ADMIN — HEPARIN SODIUM 5000 UNITS: 10000 INJECTION INTRAVENOUS; SUBCUTANEOUS at 08:31

## 2019-08-13 RX ADMIN — VANCOMYCIN HYDROCHLORIDE 1.5 G: 10 INJECTION, POWDER, LYOPHILIZED, FOR SOLUTION INTRAVENOUS at 12:41

## 2019-08-13 RX ADMIN — DOCUSATE SODIUM 100 MG: 100 CAPSULE, LIQUID FILLED ORAL at 08:34

## 2019-08-13 RX ADMIN — Medication 10 ML: at 12:51

## 2019-08-13 RX ADMIN — SODIUM CHLORIDE, POTASSIUM CHLORIDE, SODIUM LACTATE AND CALCIUM CHLORIDE: 600; 310; 30; 20 INJECTION, SOLUTION INTRAVENOUS at 05:48

## 2019-08-13 RX ADMIN — SODIUM CHLORIDE, POTASSIUM CHLORIDE, SODIUM LACTATE AND CALCIUM CHLORIDE: 600; 310; 30; 20 INJECTION, SOLUTION INTRAVENOUS at 03:18

## 2019-08-13 RX ADMIN — SODIUM CHLORIDE, POTASSIUM CHLORIDE, SODIUM LACTATE AND CALCIUM CHLORIDE: 600; 310; 30; 20 INJECTION, SOLUTION INTRAVENOUS at 23:38

## 2019-08-13 RX ADMIN — IPRATROPIUM BROMIDE AND ALBUTEROL SULFATE 1 AMPULE: .5; 3 SOLUTION RESPIRATORY (INHALATION) at 17:22

## 2019-08-13 RX ADMIN — FENTANYL CITRATE 25 MCG: 0.05 INJECTION, SOLUTION INTRAMUSCULAR; INTRAVENOUS at 04:24

## 2019-08-13 ASSESSMENT — PAIN DESCRIPTION - LOCATION: LOCATION: LEG

## 2019-08-13 ASSESSMENT — PAIN SCALES - GENERAL
PAINLEVEL_OUTOF10: 0
PAINLEVEL_OUTOF10: 9

## 2019-08-13 ASSESSMENT — PAIN DESCRIPTION - ORIENTATION: ORIENTATION: RIGHT

## 2019-08-13 ASSESSMENT — PAIN DESCRIPTION - PAIN TYPE: TYPE: ACUTE PAIN

## 2019-08-13 NOTE — PROGRESS NOTES
0.01 08/13/2019    BASOSABS 0.01 08/13/2019       CMP     Lab Results   Component Value Date     08/13/2019    K 3.9 08/13/2019     08/13/2019    CO2 27 08/13/2019    BUN 10 08/13/2019    CREATININE 0.9 08/13/2019    GFRAA >60 08/13/2019    LABGLOM >60 08/13/2019    GLUCOSE 97 08/13/2019    GLUCOSE 95 07/08/2011    PROT 8.6 08/11/2019    LABALBU 4.7 08/11/2019    LABALBU 3.6 07/08/2011    CALCIUM 7.5 08/13/2019    BILITOT 0.4 08/11/2019    ALKPHOS 102 08/11/2019     08/11/2019    ALT 90 08/11/2019         Hepatic Function Panel:    Lab Results   Component Value Date    ALKPHOS 102 08/11/2019    ALT 90 08/11/2019     08/11/2019    PROT 8.6 08/11/2019    BILITOT 0.4 08/11/2019    BILIDIR <0.2 06/13/2019    IBILI see below 06/13/2019    LABALBU 4.7 08/11/2019    LABALBU 3.6 07/08/2011       PT/INR:    Lab Results   Component Value Date    PROTIME 10.8 08/11/2019    INR 1.0 08/11/2019       TSH:    Lab Results   Component Value Date    TSH 0.594 06/14/2019       U/A:    Lab Results   Component Value Date    COLORU Yellow 08/11/2019    PHUR 5.5 08/11/2019    WBCUA 0-1 08/11/2019    WBCUA 0-1 07/07/2011    RBCUA 0-1 08/11/2019    RBCUA NONE 10/20/2012    BACTERIA FEW 08/11/2019    CLARITYU Clear 08/11/2019    SPECGRAV >=1.030 08/11/2019    LEUKOCYTESUR Negative 08/11/2019    UROBILINOGEN 0.2 08/11/2019    BILIRUBINUR Negative 08/11/2019    BILIRUBINUR NEGATIVE 07/07/2011    BLOODU LARGE 08/11/2019    GLUCOSEU 100 08/11/2019    GLUCOSEU NEGATIVE 07/07/2011       ABG:    Lab Results   Component Value Date    Q3HVJBWG 84.4 10/05/2010    PHART 7.399 07/06/2011       MICROBIOLOGY:    Blood culture - CONS       Radiology :    Chest X ray    1. Bibasilar pleural-parenchymal disease. 2. Underlying central pulmonary vascular congestion with thoracic  aortic vascular calcifications. IMPRESSION:     1. Polysubstance abuse   2. Hep C infection   3. Rhabdomyolysis s/p fasciotomy   4.  Leukocytosis -

## 2019-08-13 NOTE — CONSULTS
Depression  [] Anxiety  [] Bipolar  [] Psychosis  []  Other                  [] Sibling               [] Depression  [] Anxiety  [] Bipolar  [] Psychosis  []  Other                  [] Grandparent               [] Depression  [] Anxiety  [] Bipolar  [] Psychosis  []  Other       SOCIAL HISTORY:     1. Living Situation:[x] Private Residence [] Homeless [] [address]             [] Assisted Living [] [address]  [] Shelter [] Other   2. Employment:  [x] Unemployed  [] Employed  [] Disabled  [] Retired   1. Legal History: [] No Arrest [] Arrest  [] Theft  []  Assault  [] Substances   4. History of Trama/ Abuse: [] Denies  [] Emotional [] Physical [] Sexual   5. Spirituality: [] Spiritual [] Not Spiritual   6. Substance Abuse: [] Denies  [x] Drug of choice      [] Amphetamines [] Marijuana [x] Cocaine      [] Opioids  [] Alcohol  [x] Benzodiazepines     For further SH review SW note. Risk Assessment:  1. Risk Factors:   [x] Depression  [] Anxiety  [] Psychosis   [x] Suicidal/Homicidal Thoughts [x] Suicide Attempt [] Substance Abuse     2. Protective Factors: [x] Controlled Environment         [] Supportive Family []         [] Druze Support     3. Level of Risk: [] Mild [] Moderate [x] Severe      Strengths & Weaknesses:    1. Strengths: [x] Ability to communicate feelings     [x] Independent ADL's     [] Supportive Family    [] Current Health Status     2.  Weaknesses: [x] Emotional          [] Motivational     MEDICATIONS: Current Facility-Administered Medications: acetaminophen (TYLENOL) tablet 650 mg, 650 mg, Oral, Q4H PRN  vancomycin 1.5 g in dextrose 5% 300 mL IVPB, 1,500 mg, Intravenous, Once  piperacillin-tazobactam (ZOSYN) 3.375 g in dextrose 5 % 100 mL IVPB extended infusion (mini-bag), 3.375 g, Intravenous, Q8H  ipratropium-albuterol (DUONEB) nebulizer solution 1 ampule, 1 ampule, Inhalation, Q4H WA  [START ON 8/14/2019] enoxaparin (LOVENOX) injection 40 mg, 40 mg, Subcutaneous,

## 2019-08-13 NOTE — PLAN OF CARE
Problem: Falls - Risk of:  Goal: Will remain free from falls  Description  Will remain free from falls  8/13/2019 1823 by Pattie Warren RN  Outcome: Met This Shift     Problem: Falls - Risk of:  Goal: Absence of physical injury  Description  Absence of physical injury  8/13/2019 1822 by Pattie Warren RN  Outcome: Met This Shift     Problem: Risk for Impaired Skin Integrity  Goal: Tissue integrity - skin and mucous membranes  Description  Structural intactness and normal physiological function of skin and  mucous membranes.   8/13/2019 1823 by Pattie Warren RN  Outcome: Not Met This Shift

## 2019-08-13 NOTE — PROGRESS NOTES
SPO2 post session 96%     Functional Status Score-Intensive Care Unit (FSS-ICU)   Rolling 4/7   Supine to sit transfer 3/7   Unsupported sitting  4/7   Sit to stand transfers 3/7   Ambulation 1/7   Total  15/35     Patient education  Pt educated on PT role, safety during functional mobility, WB precautions for RLE    Patient response to education:   Pt verbalized understanding Pt demonstrated skill Pt requires further education in this area   Yes Partial  Yes      Comments:  Pt received supine and agreeable to PT session with OT collaboration. Pt cleared for participation by RN prior to session. Vitals monitored during session. Pt limited by high level of pain and lethargy. Educated multiple times on NWB RLE but still needed frequent reminders and physical assist to adhere to precautions during mobility. Pt showed good ability scooting self in bed. Sat up to EOB showing adequate strength but needing assist to mitigate pain levels. Stood from bedside with assist for balance and maintaining RLE off floor. Able to hop to R a short distance with good use of UEs on [employer]. Needing to sit d/t fatigue. Tolerated >10 minutes of overall activity. Returned to supine and placed in upright sitting position.      PLAN  Continue PT POC    Time in  1315  Time out  [address], PT, DPT  [id number]

## 2019-08-13 NOTE — PROGRESS NOTES
Value Date    COLORU Yellow 2019    PROTEINU 30 2019    PHUR 5.5 2019    WBCUA 0-1 2019    WBCUA 0-1 2011    RBCUA 0-1 2019    RBCUA NONE 10/20/2012    BACTERIA FEW 2019    CLARITYU Clear 2019    SPECGRAV >=1.030 2019    LEUKOCYTESUR Negative 2019    UROBILINOGEN 0.2 2019    BILIRUBINUR Negative 2019    BILIRUBINUR NEGATIVE 2011    BLOODU LARGE 2019    GLUCOSEU 100 2019    GLUCOSEU NEGATIVE 2011     ABG:    Lab Results   Component Value Date    PH 7.365 2019    PH 7.279 10/05/2010    PCO2 36.5 2019    PO2 101.9 2019    HCO3 20.4 2019    BE -4.3 2019    O2SAT 97.4 2019       Imaging Studies:     Ct Head Wo Contrast    Result Date: 2019  Patient MRN:  [id number] : 1965 Age: 48 years Gender: Male Order Date:  2019 3:15 PM EXAM: CT HEAD WO CONTRAST, CT CERVICAL SPINE WO CONTRAST NONCONTRAST CT SCAN OF THE HEAD: NUMBER OF IMAGES:  149 INDICATION:  Fall unresponsive, drug overdose COMPARISON: Previous noncontrasted CT study 2019 and prior study 2018 TECHNIQUE: Axial noncontrast images were extended through the head, and are reformatted in all 3 imaging planes. Images reviewed at soft tissue and bone window settings. Low-dose CT  acquisition technique included one of following options; 1 . Automated exposure control, 2. Adjustment of MA and or KV according to patient's size or 3. Use of iterative reconstruction. FINDINGS: Intracranial anatomy shows symmetry of cisterns, sulci, and ventricles, which are proportionate and symmetric. Gray-white matter differentiation is normal. There is no evidence of hemorrhage or mass effect, and no pathologic extra-axial fluid collections are noted. There is no evidence of skeletal trauma or sinus opacification. 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  Extracranial soft tissues

## 2019-08-13 NOTE — PROGRESS NOTES
Hospitalist Progress Note      PCP: Khai Jain DO    Date of Admission: 8/11/2019    Chief Complaint: *drug [facility] [place] Course: * found to be pos for benzo's , cocaine and methadone, concern for compartment syndrome, had to have a fasciotomy on RLE. Just extubated, still lethargic **     Subjective:  No complaints        Medications:  Reviewed    Infusion Medications    lactated ringers 150 mL/hr at 08/13/19 1240     Scheduled Medications    piperacillin-tazobactam  3.375 g Intravenous Q8H    ipratropium-albuterol  1 ampule Inhalation Q4H WA    [START ON 8/14/2019] enoxaparin  40 mg Subcutaneous Daily    pantoprazole  40 mg Intravenous Daily    And    sodium chloride (PF)  10 mL Intravenous Daily    docusate sodium  100 mg Oral BID    sodium chloride flush  10 mL Intravenous 2 times per day     PRN Meds: acetaminophen, fentanNYL, ondansetron, sodium chloride flush, magnesium hydroxide      Intake/Output Summary (Last 24 hours) at 8/13/2019 1405  Last data filed at 8/13/2019 1400  Gross per 24 hour   Intake 6131 ml   Output 4670 ml   Net 1461 ml       Exam:    /79   Pulse 112   Temp 101.3 °F (38.5 °C) (Core)   Resp 15   Ht 5' 7\" (1.702 m)   Wt 178 lb (80.7 kg)   SpO2 96%   BMI 27.88 kg/m²       Gen: * well developed  HEENT: NC/AT, moist mucous membranes,   Neck: supple, trachea midline, no anterior cervical or SC LAD  Heart:  Normal s1/s2, RRR, no murmurs, gallops, or rubs. Lungs:  cta * bilaterally,   Abd: bowel sounds present, soft, nontender, nondistended, no masses  Extrem:  No clubbing, cyanosis,   Pos edema, RLE, dsd and * edema  Skin: no rashes or lesions  Psych: A & O x3  Neuro: grossly intact, moves all four extremities.     Capillary Refill: Brisk,< 3 seconds   Peripheral Pulses: +2 palpable, equal bilaterally                   Labs:   Recent Labs     08/11/19  1308 08/13/19  0525   WBC 15.1* 9.4   HGB 16.8* 11.9*   HCT 52.5 35.3*    165     Recent Labs

## 2019-08-13 NOTE — PROGRESS NOTES
Occupational Therapy  Treatment Note  Date:2019  Patient Name: Thad Salmeron  MRN: [id number]  : 1965  Room: [address] [place] [address]    Evaluating OT: LEIGH Lewis/L 6174    AM-PAC Daily Activity Raw Score:   Recommended Adaptive Equipment: TBD    Comments: Based on patient's functional performance as stated above and level of assistance needed prior to admission, this therapist believes that the patient would benefit from continued skilled OT during/following hospital stay in an effort to increase safety, functional independence with ADLs/IADLs, and quality of life. Diagnosis: Drug overdose  Procedure: Intubated 19,  RLE fasciotomy with application of wound vac 19,  Extubated 19   Pertinent Medical History: Arthritis, Aspiration PNA, Bipolar 1 disorder, Hep C, Schizophrenia, vertebral osteomyelitis     Precautions:  Falls, RLE NWB with wound vac, oxygen,  constant observer    Per chart-  Home Living: Pt lives in a 1 story home with 3 step(s) to enter and 1 rail(s); bed/bath on  1 floor. Bathroom setup: Pt using walk-in shower; standard toilet  Equipment owned: none    Prior Level of Function: Indep with ADLs; indep with IADLs; using no device for ambulation. Driving: yes  Occupation: retired construction    Pain Level: 1010 R knee  Communication: F ability to express needs d/t lethargy;mod cues for keeping eyes open  Cognition: A&O: 3/4 (not to situation); Follows 2-3 step directions with mod cues for initiation and follow-through. P attention to task, distracted by pain. Memory: F- STM  Initiation/termination: F   Sequencing: F+              Comprehension: F   Problem solving: F-   Judgement/safety: F-     RASS: -1 to 0  CAM-ICU: negative     Functional Assessment:   Initial Eval Status  Date: 19 Treatment Status  Date: 19 Short Term Goals  Treatment frequency: 1-3x/week on ICU; PRN on stepdown unit  -pt will improve. ..    Feeding Min A  Stabilize cup to mouth to

## 2019-08-13 NOTE — PROGRESS NOTES
Department of Orthopedic Surgery  Resident Progress Note    Patient seen and examined. Extubated yesterday. Complaining of pain to LLE. No new complaints per nursing staff. VITALS:  BP (!) 144/88   Pulse 110   Temp 100.2 °F (37.9 °C) (Core)   Resp 22   Ht 5' 7\" (1.702 m)   Wt 178 lb (80.7 kg)   SpO2 96%   BMI 27.88 kg/m²     GENERAL: awake and alert to name/ situation  MUSCULOSKELETAL:   right lower extremity:  · Wound vac C/D/I  · Compartments firm but compressible.  Thigh is soft and compressible  · Palpable dorsalis pedis and posterior tibialis pulse, brisk cap refill to toes, foot warm and perfused  · Motor and sensation not intact at this time to leg/foot    CBC:   Lab Results   Component Value Date    WBC 9.4 08/13/2019    HGB 11.9 08/13/2019    HCT 35.3 08/13/2019     08/13/2019       ASSESSMENT  · S/P R lower leg fasciotomy 8/11    PLAN    NWB RLE  Pain control pr primary team  DVT prophylaxis- per primary team  Monitor Labs  Trend H&H  Repeat I&d in OR 8/14  PT/OT  D/C planning, SW/PT recs  Continue to follow   Discuss with attending

## 2019-08-14 ENCOUNTER — ANESTHESIA (OUTPATIENT)
Dept: OPERATING ROOM | Age: 54
DRG: 317 | End: 2019-08-14
Payer: COMMERCIAL

## 2019-08-14 ENCOUNTER — ANESTHESIA EVENT (OUTPATIENT)
Dept: OPERATING ROOM | Age: 54
DRG: 317 | End: 2019-08-14
Payer: COMMERCIAL

## 2019-08-14 ENCOUNTER — APPOINTMENT (OUTPATIENT)
Dept: GENERAL RADIOLOGY | Age: 54
DRG: 317 | End: 2019-08-14
Payer: COMMERCIAL

## 2019-08-14 ENCOUNTER — TELEPHONE (OUTPATIENT)
Dept: ORTHOPEDIC SURGERY | Age: 54
End: 2019-08-14

## 2019-08-14 VITALS — TEMPERATURE: 100.4 F | DIASTOLIC BLOOD PRESSURE: 71 MMHG | SYSTOLIC BLOOD PRESSURE: 120 MMHG | OXYGEN SATURATION: 100 %

## 2019-08-14 LAB
ANION GAP SERPL CALCULATED.3IONS-SCNC: 8 MMOL/L (ref 7–16)
APTT: 194 SEC (ref 24.5–35.1)
BASOPHILS ABSOLUTE: 0.02 E9/L (ref 0–0.2)
BASOPHILS RELATIVE PERCENT: 0.2 % (ref 0–2)
BUN BLDV-MCNC: 8 MG/DL (ref 6–20)
CALCIUM IONIZED: 1.14 MMOL/L (ref 1.15–1.33)
CALCIUM SERPL-MCNC: 7.8 MG/DL (ref 8.6–10.2)
CHLORIDE BLD-SCNC: 103 MMOL/L (ref 98–107)
CO2: 27 MMOL/L (ref 22–29)
CREAT SERPL-MCNC: 0.9 MG/DL (ref 0.7–1.2)
CULTURE, BLOOD 2: ABNORMAL
CULTURE, BLOOD 2: ABNORMAL
CULTURE, RESPIRATORY: NORMAL
EOSINOPHILS ABSOLUTE: 0.01 E9/L (ref 0.05–0.5)
EOSINOPHILS RELATIVE PERCENT: 0.1 % (ref 0–6)
GFR AFRICAN AMERICAN: >60
GFR NON-AFRICAN AMERICAN: >60 ML/MIN/1.73
GLUCOSE BLD-MCNC: 102 MG/DL (ref 74–99)
HCT VFR BLD CALC: 34.9 % (ref 37–54)
HEMOGLOBIN: 11.5 G/DL (ref 12.5–16.5)
IMMATURE GRANULOCYTES #: 0.04 E9/L
IMMATURE GRANULOCYTES %: 0.4 % (ref 0–5)
INR BLD: 1.2
LYMPHOCYTES ABSOLUTE: 1.09 E9/L (ref 1.5–4)
LYMPHOCYTES RELATIVE PERCENT: 11.8 % (ref 20–42)
MCH RBC QN AUTO: 31.3 PG (ref 26–35)
MCHC RBC AUTO-ENTMCNC: 33 % (ref 32–34.5)
MCV RBC AUTO: 95.1 FL (ref 80–99.9)
MONOCYTES ABSOLUTE: 0.7 E9/L (ref 0.1–0.95)
MONOCYTES RELATIVE PERCENT: 7.6 % (ref 2–12)
NEUTROPHILS ABSOLUTE: 7.36 E9/L (ref 1.8–7.3)
NEUTROPHILS RELATIVE PERCENT: 79.9 % (ref 43–80)
ORGANISM: ABNORMAL
PDW BLD-RTO: 13.8 FL (ref 11.5–15)
PHOSPHORUS: 2.5 MG/DL (ref 2.5–4.5)
PLATELET # BLD: 176 E9/L (ref 130–450)
PMV BLD AUTO: 10.3 FL (ref 7–12)
POTASSIUM REFLEX MAGNESIUM: 3.9 MMOL/L (ref 3.5–5)
PROTHROMBIN TIME: 13.4 SEC (ref 9.3–12.4)
RBC # BLD: 3.67 E12/L (ref 3.8–5.8)
SMEAR, RESPIRATORY: NORMAL
SODIUM BLD-SCNC: 138 MMOL/L (ref 132–146)
TOTAL CK: ABNORMAL U/L (ref 20–200)
URINE CULTURE, ROUTINE: NORMAL
WBC # BLD: 9.2 E9/L (ref 4.5–11.5)

## 2019-08-14 PROCEDURE — 6360000002 HC RX W HCPCS: Performed by: INTERNAL MEDICINE

## 2019-08-14 PROCEDURE — 3700000001 HC ADD 15 MINUTES (ANESTHESIA): Performed by: ORTHOPAEDIC SURGERY

## 2019-08-14 PROCEDURE — 82330 ASSAY OF CALCIUM: CPT

## 2019-08-14 PROCEDURE — 11043 DBRDMT MUSC&/FSCA 1ST 20/<: CPT | Performed by: ORTHOPAEDIC SURGERY

## 2019-08-14 PROCEDURE — 84100 ASSAY OF PHOSPHORUS: CPT

## 2019-08-14 PROCEDURE — 2580000003 HC RX 258: Performed by: INTERNAL MEDICINE

## 2019-08-14 PROCEDURE — 2700000000 HC OXYGEN THERAPY PER DAY

## 2019-08-14 PROCEDURE — 2580000003 HC RX 258: Performed by: NURSE ANESTHETIST, CERTIFIED REGISTERED

## 2019-08-14 PROCEDURE — 2500000003 HC RX 250 WO HCPCS: Performed by: NURSE ANESTHETIST, CERTIFIED REGISTERED

## 2019-08-14 PROCEDURE — 3600000002 HC SURGERY LEVEL 2 BASE: Performed by: ORTHOPAEDIC SURGERY

## 2019-08-14 PROCEDURE — 71045 X-RAY EXAM CHEST 1 VIEW: CPT

## 2019-08-14 PROCEDURE — 80048 BASIC METABOLIC PNL TOTAL CA: CPT

## 2019-08-14 PROCEDURE — 3600000012 HC SURGERY LEVEL 2 ADDTL 15MIN: Performed by: ORTHOPAEDIC SURGERY

## 2019-08-14 PROCEDURE — 7100000001 HC PACU RECOVERY - ADDTL 15 MIN: Performed by: ORTHOPAEDIC SURGERY

## 2019-08-14 PROCEDURE — 2709999900 HC NON-CHARGEABLE SUPPLY: Performed by: ORTHOPAEDIC SURGERY

## 2019-08-14 PROCEDURE — 0KBS0ZZ EXCISION OF RIGHT LOWER LEG MUSCLE, OPEN APPROACH: ICD-10-PCS | Performed by: ORTHOPAEDIC SURGERY

## 2019-08-14 PROCEDURE — 3700000000 HC ANESTHESIA ATTENDED CARE: Performed by: ORTHOPAEDIC SURGERY

## 2019-08-14 PROCEDURE — 94640 AIRWAY INHALATION TREATMENT: CPT

## 2019-08-14 PROCEDURE — 36415 COLL VENOUS BLD VENIPUNCTURE: CPT

## 2019-08-14 PROCEDURE — 6370000000 HC RX 637 (ALT 250 FOR IP): Performed by: INTERNAL MEDICINE

## 2019-08-14 PROCEDURE — 2000000000 HC ICU R&B

## 2019-08-14 PROCEDURE — 82550 ASSAY OF CK (CPK): CPT

## 2019-08-14 PROCEDURE — 7100000000 HC PACU RECOVERY - FIRST 15 MIN: Performed by: ORTHOPAEDIC SURGERY

## 2019-08-14 PROCEDURE — 85610 PROTHROMBIN TIME: CPT

## 2019-08-14 PROCEDURE — 6360000002 HC RX W HCPCS: Performed by: NURSE ANESTHETIST, CERTIFIED REGISTERED

## 2019-08-14 PROCEDURE — 85730 THROMBOPLASTIN TIME PARTIAL: CPT

## 2019-08-14 PROCEDURE — C9113 INJ PANTOPRAZOLE SODIUM, VIA: HCPCS | Performed by: INTERNAL MEDICINE

## 2019-08-14 PROCEDURE — 97605 NEG PRS WND THER DME<=50SQCM: CPT | Performed by: ORTHOPAEDIC SURGERY

## 2019-08-14 PROCEDURE — 11046 DBRDMT MUSC&/FSCA EA ADDL: CPT | Performed by: ORTHOPAEDIC SURGERY

## 2019-08-14 PROCEDURE — 2580000003 HC RX 258: Performed by: STUDENT IN AN ORGANIZED HEALTH CARE EDUCATION/TRAINING PROGRAM

## 2019-08-14 PROCEDURE — 85025 COMPLETE CBC W/AUTO DIFF WBC: CPT

## 2019-08-14 RX ORDER — OXYCODONE HYDROCHLORIDE 10 MG/1
10 TABLET ORAL EVERY 4 HOURS PRN
Status: DISCONTINUED | OUTPATIENT
Start: 2019-08-14 | End: 2019-08-28 | Stop reason: HOSPADM

## 2019-08-14 RX ORDER — SODIUM CHLORIDE 0.9 % (FLUSH) 0.9 %
10 SYRINGE (ML) INJECTION PRN
Status: DISCONTINUED | OUTPATIENT
Start: 2019-08-14 | End: 2019-08-22 | Stop reason: SDUPTHER

## 2019-08-14 RX ORDER — ROCURONIUM BROMIDE 10 MG/ML
INJECTION, SOLUTION INTRAVENOUS PRN
Status: DISCONTINUED | OUTPATIENT
Start: 2019-08-14 | End: 2019-08-14 | Stop reason: SDUPTHER

## 2019-08-14 RX ORDER — GLYCOPYRROLATE 1 MG/5 ML
SYRINGE (ML) INTRAVENOUS PRN
Status: DISCONTINUED | OUTPATIENT
Start: 2019-08-14 | End: 2019-08-14 | Stop reason: SDUPTHER

## 2019-08-14 RX ORDER — DEXAMETHASONE SODIUM PHOSPHATE 10 MG/ML
INJECTION INTRAMUSCULAR; INTRAVENOUS PRN
Status: DISCONTINUED | OUTPATIENT
Start: 2019-08-14 | End: 2019-08-14 | Stop reason: SDUPTHER

## 2019-08-14 RX ORDER — DOCUSATE SODIUM 100 MG/1
100 CAPSULE, LIQUID FILLED ORAL 2 TIMES DAILY
Status: DISCONTINUED | OUTPATIENT
Start: 2019-08-14 | End: 2019-08-22 | Stop reason: SDUPTHER

## 2019-08-14 RX ORDER — ONDANSETRON 2 MG/ML
INJECTION INTRAMUSCULAR; INTRAVENOUS PRN
Status: DISCONTINUED | OUTPATIENT
Start: 2019-08-14 | End: 2019-08-14 | Stop reason: SDUPTHER

## 2019-08-14 RX ORDER — HEPARIN SODIUM (PORCINE) LOCK FLUSH IV SOLN 100 UNIT/ML 100 UNIT/ML
SOLUTION INTRAVENOUS
Status: DISPENSED
Start: 2019-08-14 | End: 2019-08-14

## 2019-08-14 RX ORDER — PROPOFOL 10 MG/ML
INJECTION, EMULSION INTRAVENOUS PRN
Status: DISCONTINUED | OUTPATIENT
Start: 2019-08-14 | End: 2019-08-14 | Stop reason: SDUPTHER

## 2019-08-14 RX ORDER — NEOSTIGMINE METHYLSULFATE 1 MG/ML
INJECTION, SOLUTION INTRAVENOUS PRN
Status: DISCONTINUED | OUTPATIENT
Start: 2019-08-14 | End: 2019-08-14 | Stop reason: SDUPTHER

## 2019-08-14 RX ORDER — SODIUM CHLORIDE 9 MG/ML
INJECTION, SOLUTION INTRAVENOUS CONTINUOUS PRN
Status: DISCONTINUED | OUTPATIENT
Start: 2019-08-14 | End: 2019-08-14 | Stop reason: SDUPTHER

## 2019-08-14 RX ORDER — SODIUM CHLORIDE 0.9 % (FLUSH) 0.9 %
10 SYRINGE (ML) INJECTION PRN
Status: DISCONTINUED | OUTPATIENT
Start: 2019-08-14 | End: 2019-08-14 | Stop reason: SDUPTHER

## 2019-08-14 RX ORDER — OXYCODONE HYDROCHLORIDE 5 MG/1
5 TABLET ORAL EVERY 4 HOURS PRN
Status: DISCONTINUED | OUTPATIENT
Start: 2019-08-14 | End: 2019-08-28 | Stop reason: HOSPADM

## 2019-08-14 RX ORDER — SODIUM CHLORIDE 0.9 % (FLUSH) 0.9 %
10 SYRINGE (ML) INJECTION EVERY 12 HOURS SCHEDULED
Status: DISCONTINUED | OUTPATIENT
Start: 2019-08-14 | End: 2019-08-22 | Stop reason: SDUPTHER

## 2019-08-14 RX ORDER — ACETAMINOPHEN 325 MG/1
650 TABLET ORAL EVERY 4 HOURS PRN
Status: DISCONTINUED | OUTPATIENT
Start: 2019-08-14 | End: 2019-08-28 | Stop reason: HOSPADM

## 2019-08-14 RX ORDER — FENTANYL CITRATE 50 UG/ML
INJECTION, SOLUTION INTRAMUSCULAR; INTRAVENOUS PRN
Status: DISCONTINUED | OUTPATIENT
Start: 2019-08-14 | End: 2019-08-14 | Stop reason: SDUPTHER

## 2019-08-14 RX ORDER — LIDOCAINE HYDROCHLORIDE 20 MG/ML
INJECTION, SOLUTION INTRAVENOUS PRN
Status: DISCONTINUED | OUTPATIENT
Start: 2019-08-14 | End: 2019-08-14 | Stop reason: SDUPTHER

## 2019-08-14 RX ADMIN — Medication 3 MG: at 13:25

## 2019-08-14 RX ADMIN — SODIUM CHLORIDE, POTASSIUM CHLORIDE, SODIUM LACTATE AND CALCIUM CHLORIDE: 600; 310; 30; 20 INJECTION, SOLUTION INTRAVENOUS at 16:36

## 2019-08-14 RX ADMIN — Medication 10 ML: at 09:20

## 2019-08-14 RX ADMIN — Medication 10 ML: at 20:00

## 2019-08-14 RX ADMIN — FENTANYL CITRATE 150 MCG: 50 INJECTION, SOLUTION INTRAMUSCULAR; INTRAVENOUS at 12:51

## 2019-08-14 RX ADMIN — IPRATROPIUM BROMIDE AND ALBUTEROL SULFATE 1 AMPULE: .5; 3 SOLUTION RESPIRATORY (INHALATION) at 08:01

## 2019-08-14 RX ADMIN — DEXAMETHASONE SODIUM PHOSPHATE 10 MG: 10 INJECTION INTRAMUSCULAR; INTRAVENOUS at 12:56

## 2019-08-14 RX ADMIN — ROCURONIUM BROMIDE 40 MG: 10 INJECTION, SOLUTION INTRAVENOUS at 12:51

## 2019-08-14 RX ADMIN — ONDANSETRON HYDROCHLORIDE 4 MG: 2 INJECTION, SOLUTION INTRAMUSCULAR; INTRAVENOUS at 13:22

## 2019-08-14 RX ADMIN — IPRATROPIUM BROMIDE AND ALBUTEROL SULFATE 1 AMPULE: .5; 3 SOLUTION RESPIRATORY (INHALATION) at 16:35

## 2019-08-14 RX ADMIN — IPRATROPIUM BROMIDE AND ALBUTEROL SULFATE 1 AMPULE: .5; 3 SOLUTION RESPIRATORY (INHALATION) at 19:51

## 2019-08-14 RX ADMIN — FENTANYL CITRATE 25 MCG: 0.05 INJECTION, SOLUTION INTRAMUSCULAR; INTRAVENOUS at 16:37

## 2019-08-14 RX ADMIN — PANTOPRAZOLE SODIUM 40 MG: 40 INJECTION, POWDER, FOR SOLUTION INTRAVENOUS at 09:20

## 2019-08-14 RX ADMIN — Medication 0.6 MG: at 13:25

## 2019-08-14 RX ADMIN — FENTANYL CITRATE 50 MCG: 50 INJECTION, SOLUTION INTRAMUSCULAR; INTRAVENOUS at 13:39

## 2019-08-14 RX ADMIN — PIPERACILLIN SODIUM AND TAZOBACTAM SODIUM 3.38 G: 3; .375 INJECTION, POWDER, LYOPHILIZED, FOR SOLUTION INTRAVENOUS at 19:48

## 2019-08-14 RX ADMIN — LIDOCAINE HYDROCHLORIDE 60 MG: 20 INJECTION, SOLUTION INTRAVENOUS at 12:51

## 2019-08-14 RX ADMIN — PIPERACILLIN SODIUM AND TAZOBACTAM SODIUM 3.38 G: 3; .375 INJECTION, POWDER, LYOPHILIZED, FOR SOLUTION INTRAVENOUS at 03:19

## 2019-08-14 RX ADMIN — SODIUM CHLORIDE, POTASSIUM CHLORIDE, SODIUM LACTATE AND CALCIUM CHLORIDE: 600; 310; 30; 20 INJECTION, SOLUTION INTRAVENOUS at 06:17

## 2019-08-14 RX ADMIN — PROPOFOL 100 MG: 10 INJECTION, EMULSION INTRAVENOUS at 12:51

## 2019-08-14 RX ADMIN — SODIUM CHLORIDE: 9 INJECTION, SOLUTION INTRAVENOUS at 12:40

## 2019-08-14 RX ADMIN — PIPERACILLIN SODIUM AND TAZOBACTAM SODIUM 3.38 G: 3; .375 INJECTION, POWDER, LYOPHILIZED, FOR SOLUTION INTRAVENOUS at 12:37

## 2019-08-14 RX ADMIN — SODIUM CHLORIDE: 9 INJECTION, SOLUTION INTRAVENOUS at 13:11

## 2019-08-14 ASSESSMENT — PULMONARY FUNCTION TESTS
PIF_VALUE: 24
PIF_VALUE: 2
PIF_VALUE: 3
PIF_VALUE: 24
PIF_VALUE: 25
PIF_VALUE: 23
PIF_VALUE: 25
PIF_VALUE: 19
PIF_VALUE: 1
PIF_VALUE: 14
PIF_VALUE: 1
PIF_VALUE: 0
PIF_VALUE: 19
PIF_VALUE: 24
PIF_VALUE: 23
PIF_VALUE: 26
PIF_VALUE: 15
PIF_VALUE: 24
PIF_VALUE: 1
PIF_VALUE: 25
PIF_VALUE: 25
PIF_VALUE: 26
PIF_VALUE: 18
PIF_VALUE: 2
PIF_VALUE: 0
PIF_VALUE: 14
PIF_VALUE: 18
PIF_VALUE: 1
PIF_VALUE: 25
PIF_VALUE: 18
PIF_VALUE: 25
PIF_VALUE: 12
PIF_VALUE: 2
PIF_VALUE: 2
PIF_VALUE: 1
PIF_VALUE: 1
PIF_VALUE: 23
PIF_VALUE: 18
PIF_VALUE: 25
PIF_VALUE: 23
PIF_VALUE: 1
PIF_VALUE: 26
PIF_VALUE: 25
PIF_VALUE: 3
PIF_VALUE: 25
PIF_VALUE: 12
PIF_VALUE: 23
PIF_VALUE: 1
PIF_VALUE: 23
PIF_VALUE: 18
PIF_VALUE: 23
PIF_VALUE: 26
PIF_VALUE: 12
PIF_VALUE: 26
PIF_VALUE: 24
PIF_VALUE: 24
PIF_VALUE: 25
PIF_VALUE: 13
PIF_VALUE: 25
PIF_VALUE: 23
PIF_VALUE: 1
PIF_VALUE: 1
PIF_VALUE: 2
PIF_VALUE: 1
PIF_VALUE: 24
PIF_VALUE: 24

## 2019-08-14 ASSESSMENT — PAIN SCALES - GENERAL
PAINLEVEL_OUTOF10: 8
PAINLEVEL_OUTOF10: 0

## 2019-08-14 ASSESSMENT — PAIN DESCRIPTION - LOCATION: LOCATION: LEG;KNEE

## 2019-08-14 ASSESSMENT — PAIN DESCRIPTION - ORIENTATION: ORIENTATION: RIGHT

## 2019-08-14 ASSESSMENT — PAIN DESCRIPTION - DESCRIPTORS: DESCRIPTORS: PATIENT UNABLE TO DESCRIBE

## 2019-08-14 ASSESSMENT — PAIN DESCRIPTION - PAIN TYPE: TYPE: ACUTE PAIN;SURGICAL PAIN

## 2019-08-14 ASSESSMENT — LIFESTYLE VARIABLES: SMOKING_STATUS: 1

## 2019-08-14 NOTE — ANESTHESIA PRE PROCEDURE
dextrose 5 % 100 mL IVPB extended infusion (mini-bag)  3.375 g Intravenous Q8H Stephen Fountain MD 25 mL/hr at 08/14/19 1237 3.375 g at 08/14/19 1237    ipratropium-albuterol (DUONEB) nebulizer solution 1 ampule  1 ampule Inhalation Q4H WA Cecilia Barboza MD   1 ampule at 08/14/19 0801    enoxaparin (LOVENOX) injection 40 mg  40 mg Subcutaneous Daily Cecilia Barboza MD        pantoprazole (PROTONIX) injection 40 mg  40 mg Intravenous Daily Sindy Colon MD   40 mg at 08/14/19 0920    And    sodium chloride (PF) 0.9 % injection 10 mL  10 mL Intravenous Daily Sindy Colon MD   10 mL at 08/14/19 0920    fentaNYL (SUBLIMAZE) injection 25 mcg  25 mcg Intravenous Q2H PRN Saurabh Contreras MD   25 mcg at 08/13/19 2127    lactated ringers infusion   Intravenous Continuous Hilmer Chrissie Motta  mL/hr at 08/14/19 1142      docusate sodium (COLACE) capsule 100 mg  100 mg Oral BID Katheryn Cheek DO   100 mg at 08/13/19 2113    ondansetron (ZOFRAN) injection 4 mg  4 mg Intravenous Q6H PRN Katheryn Cheek DO        sodium chloride flush 0.9 % injection 10 mL  10 mL Intravenous 2 times per day Sindy Colon MD   10 mL at 08/14/19 0920    sodium chloride flush 0.9 % injection 10 mL  10 mL Intravenous PRN Sindy Colon MD   10 mL at 08/13/19 1616    magnesium hydroxide (MILK OF MAGNESIA) 400 MG/5ML suspension 30 mL  30 mL Oral Daily PRN Sindy Colon MD           Allergies:  No Known Allergies    Problem List:    Patient Active Problem List   Diagnosis Code    Intentional drug overdose ([address] T50.902A    Bipolar 1 disorder ([address] F31.9    Schizophrenia ([address] F20.9    Mood disorder ([address] F39    Aspiration pneumonia of both lungs due to gastric secretions ([address] J69.0    Respiratory failure ([address] J96.90    Drug overdose, accidental or unintentional, initial encounter T50.901A    Drug overdose T50.901A    Drug overdose, intentional, initial encounter ([facility] T50.902A       Past
08/14/2019     08/14/2019    CO2 27 08/14/2019    BUN 8 08/14/2019    CREATININE 0.9 08/14/2019    GFRAA >60 08/14/2019    LABGLOM >60 08/14/2019    GLUCOSE 102 08/14/2019    GLUCOSE 95 07/08/2011    PROT 8.6 08/11/2019    CALCIUM 7.8 08/14/2019    BILITOT 0.4 08/11/2019    ALKPHOS 102 08/11/2019     08/11/2019    ALT 90 08/11/2019       POC Tests: No results for input(s): POCGLU, POCNA, POCK, POCCL, POCBUN, POCHEMO, POCHCT in the last 72 hours.     Coags:   Lab Results   Component Value Date    PROTIME 13.4 08/14/2019    INR 1.2 08/14/2019    APTT 194.0 08/14/2019       HCG (If Applicable): No results found for: PREGTESTUR, PREGSERUM, HCG, HCGQUANT     ABGs:   Lab Results   Component Value Date    PHART 7.399 07/06/2011    V0TWEMYO 84.4 10/05/2010        Type & Screen (If Applicable):  No results found for: LABABO, [address]    Anesthesia Evaluation  Patient summary reviewed  Airway: Mallampati: II     Neck ROM: full  Comment: intubated  Mouth opening: < 3 FB Dental:          Pulmonary: breath sounds clear to auscultation  (+) current smoker                          ROS comment: Acute hypoxic resp failure   Cardiovascular:            Rhythm: regular  Rate: normal           Beta Blocker:  Not on Beta Blocker         Neuro/Psych:   (+) psychiatric history:             ROS comment: Bipolar 1 disorder (HCC)  Schizophrenia (HCC)  Mood disorder (HCC)    Decreased mental status GI/Hepatic/Renal:   (+) hepatitis: C,           Endo/Other:    (+) : arthritis: OA., .                  ROS comment: polysubstance abuse Abdominal:           Vascular:                                                ANUEL Ruiz - CRNA   8/14/2019

## 2019-08-14 NOTE — PROGRESS NOTES
[facility]  Internal Medicine Residency Program  Progress note    Patient:  Vipin Live 48 y.o. male MRN: [id number]     Date of Service: 8/14/2019    Hospital Day: 4      Chief complaint: f/u AMS and rhabdomyolysis  Subjective    Patient seen and examined this morning. Patient awake ,alert, following command . Patient does report pain in right lower extremities. Patient noted have increased pain and tenderness in right upper extremities. Patient denies SOB, chest pain, abdominal pain. 24 hour changes:  -currently on NC at 2L  -Patient febrile with Tmax for 101.7,initial blood culture :GPC in cluster, CONS , restarted zosyn  -s/p right leg fasciotomy for right leg compartment syndrome 8/11, plan for repeat I and D on 8/14  -elevated CK>100k>>80K> 66 k.40K this am, trending down this morning   -creatinine improving 0.9 with adequate urine output,continue IV fluid   -Patient seen by psychiatry consider transfer to psychiatry after medically stable. Physical Exam   · Vitals: /70   Pulse 112   Temp 101.7 °F (38.7 °C) (Core)   Resp 15   Ht 5' 7\" (1.702 m)   Wt 183 lb 14.4 oz (83.4 kg)   SpO2 95%   BMI 28.80 kg/m²     · General Appearance: currently in NC , alert, awake ,following command command ,awake off sedation  · Skin: warm and dry, no rash or erythema  · Head: normocephalic and atraumatic  · Pulmonary/Chest: coarse bilateral breath sounds   · Cardiovascular: normal rate, normal S1 and S2, no gallops, intact distal pulses and no carotid bruits  · Abdomen: soft, non-tender, non-distended, normal bowel sounds, no masses or organomegaly  · Extremities: no cyanosis and no clubbing, R leg wrapped, non-soaked, (+) bilateral pedal pulses, wound vac on place,blister in right foot , increased tenderness and tensed right thigh, able to move right thigh.   · Musculoskeletal: normal range of motion, no joint swelling, deformity or tenderness  · Neurologic:alertable to follow command Lab Results   Component Value Date    TROPONINI 0.04 2019    TROPONINI 0.02 2019    TROPONINI <0.01 2019     U/A:    Lab Results   Component Value Date    COLORU Yellow 2019    PROTEINU 30 2019    PHUR 5.5 2019    WBCUA NONE 2019    WBCUA 0-1 2011    RBCUA NONE 2019    RBCUA NONE 10/20/2012    BACTERIA RARE 2019    CLARITYU Clear 2019    SPECGRAV 1.010 2019    LEUKOCYTESUR Negative 2019    UROBILINOGEN 0.2 2019    BILIRUBINUR Negative 2019    BILIRUBINUR NEGATIVE 2011    BLOODU LARGE 2019    GLUCOSEU Negative 2019    GLUCOSEU NEGATIVE 2011    AMORPHOUS FEW 2019     ABG:    Lab Results   Component Value Date    PH 7.365 2019    PH 7.279 10/05/2010    PCO2 36.5 2019    PO2 101.9 2019    HCO3 20.4 2019    BE -4.3 2019    O2SAT 97.4 2019       Imaging Studies:     Ct Head Wo Contrast    Result Date: 2019  Patient MRN:  [id number] : 1965 Age: 48 years Gender: Male Order Date:  2019 3:15 PM EXAM: CT HEAD WO CONTRAST, CT CERVICAL SPINE WO CONTRAST NONCONTRAST CT SCAN OF THE HEAD: NUMBER OF IMAGES:  149 INDICATION:  Fall unresponsive, drug overdose COMPARISON: Previous noncontrasted CT study 2019 and prior study 2018 TECHNIQUE: Axial noncontrast images were extended through the head, and are reformatted in all 3 imaging planes. Images reviewed at soft tissue and bone window settings. Low-dose CT  acquisition technique included one of following options; 1 . Automated exposure control, 2. Adjustment of MA and or KV according to patient's size or 3. Use of iterative reconstruction. FINDINGS: Intracranial anatomy shows symmetry of cisterns, sulci, and ventricles, which are proportionate and symmetric.  Gray-white matter differentiation is normal. There is no evidence of hemorrhage or mass effect, and no pathologic extra-axial fluid collections are noted. There is no evidence of skeletal trauma or sinus opacification. 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 Extracranial soft tissues show no definite evidence of acute pathology. Subtle soft tissue prominence is noted over the left forehead, and is of questionable significance Mastoid air cells and middle ear cavities are normally aerated. Possible extracranial soft tissue swelling over the left for head. No evidence of calvarial trauma or acute intracranial hemorrhage, edema, mass effect, or pathologic extra-axial fluid collection. NONCONTRAST CT CERVICAL SPINE: NUMBER OF IMAGES \ views:  65 INDICATION:  Fall unresponsive, drug overdose COMPARISON: None TECHNIQUE: Axial images were extended through the cervical spine without intravenous contrast, and reformatted multiplanar images were provided for review in soft tissue and bone window settings. Low-dose CT acquisition technique included one of following options; 1 . Automated exposure control, 2. Adjustment of MA and or KV according to patient's size or 3. Use of iterative reconstruction. FINDINGS: Bony structures are intact with relative preservation of alignment and joint spacing throughout. Degenerative changes of the cervical spine are associated with mild kyphotic curvature between the C5 and C7 levels. Individual vertebrae and facets show no evidence of acute traumatic disruption or bony displacement. There is no paraspinous soft tissue swelling. Pulmonary apical region show no acute findings, and at the skull base, mastoid sinuses and middle ear cavities appear normally aerated. Orotracheal and orogastric tubes are present. They extend beneath the lower margin of the study, but show very prominent tortuosity in the jimmy and hypopharynx of the orogastric tube. IMPRESSION:  No evidence of acute cervical bony or adjacent soft tissue traumatic findings.  The orogastric tube is extremely

## 2019-08-14 NOTE — PROGRESS NOTES
Hospitalist Progress Note      PCP: Varun Gaines DO    Date of Admission: 8/11/2019    Chief Complaint: *drug OD**     Hospital Course: * found to be pos for benzo's , cocaine and methadone, concern for compartment syndrome, had to have a fasciotomy on RLE.  Just extubated, still lethargic ** for OR today     Subjective:  No complaints       Medications:  Reviewed    Infusion Medications    lactated ringers 125 mL/hr at 08/14/19 1142     Scheduled Medications    heparin flush        lidocaine  5 mL Intradermal Once    heparin flush (100 units/mL)  1 mL Intravenous 2 times per day    piperacillin-tazobactam  3.375 g Intravenous Q8H    ipratropium-albuterol  1 ampule Inhalation Q4H WA    enoxaparin  40 mg Subcutaneous Daily    pantoprazole  40 mg Intravenous Daily    And    sodium chloride (PF)  10 mL Intravenous Daily    docusate sodium  100 mg Oral BID    sodium chloride flush  10 mL Intravenous 2 times per day     PRN Meds: sodium chloride flush, heparin flush (100 units/mL), acetaminophen, fentanNYL, ondansetron, sodium chloride flush, magnesium hydroxide      Intake/Output Summary (Last 24 hours) at 8/14/2019 1418  Last data filed at 8/14/2019 1357  Gross per 24 hour   Intake 6929 ml   Output 4890 ml   Net 2039 ml       Exam:    /72   Pulse 105   Temp 100.7 °F (38.2 °C) (Tympanic)   Resp 10   Ht 5' 7\" (1.702 m)   Wt 183 lb 14.4 oz (83.4 kg)   SpO2 100%   BMI 28.80 kg/m²       Gen: * well developed  HEENT: NC/AT, moist mucous membranes,   Neck: supple, trachea midline, no anterior cervical or SC LAD  Heart:  Normal s1/s2, RRR, no murmurs, gallops, or rubs.    Lungs:  cta * bilaterally,   Abd: bowel sounds present, soft, nontender, nondistended, no masses  Extrem:  No clubbing, cyanosis,   Pos edema, RLE, dsd and * edema  Skin: no rashes or lesions  Psych: A & O x3  Neuro: grossly intact, moves all four extremities.    Capillary Refill: Brisk,< 3 seconds   Peripheral Pulses: +2

## 2019-08-14 NOTE — PROGRESS NOTES
08/14/2019    GFRAA >60 08/14/2019    LABGLOM >60 08/14/2019    GLUCOSE 102 08/14/2019    GLUCOSE 95 07/08/2011    PROT 8.6 08/11/2019    LABALBU 4.7 08/11/2019    LABALBU 3.6 07/08/2011    CALCIUM 7.8 08/14/2019    BILITOT 0.4 08/11/2019    ALKPHOS 102 08/11/2019     08/11/2019    ALT 90 08/11/2019     Magnesium:    Lab Results   Component Value Date    MG 2.2 08/12/2019     Phosphorus:    Lab Results   Component Value Date    PHOS 2.4 08/12/2019       UA Protein: 30  Urine Chloride: <20  Urine Creatinine: 170  Urine Osmolality: 513  Urine Potassium: 59.0  Urine Sodium: <20  FeNA: 0.1%    Total CK 8/12/19 1800: 103,200 U/L  Total CK 8/12/19 2140:  83,880 U/L  Total CK 8/13/19 0525: 66,180 U/L  Total CK 8/13/19 1251: 52,590 U/L  Total CK 8/13/19 1658: 43,890 U/L  Total CK 8/13/19 2337: 43,590 U/L  Total CK 8/13/19 0430: 40,290 U/L    Radiology Review:      Chest X-ray August 11, 2019   1. Distal tip of endotracheal tube is located of proximal tracheal   bifurcation. 2. Interval increase in amount of elevation of right hemidiaphragm,   suggesting interval volume loss. 3. Patchy bilateral lung atelectasis and/or infiltrates, greatest   within the medial right lung base           BRIEF SUMMARY OF INITIAL CONSULT:    Abdifatah Parsons is 48 y.o. male with history of polysubstance abuse, HCV, bipolar 1 disorder, and schizophrenia, who was admitted on 8/11/2019 by EMS who found him on the floor of his home. The patient was unresponsive, but administration of Narcan slightly improved his mentation. Upon arrival to the ER, he was found to have AMS and right lower leg compartment syndrome. He was brought into surgery to relieve the pressure. Urine drug screen showed positivity for benzos, cocaine, methadone, and amphetamine. He had an elevated creatinine level of 2 mg/dL as well as metabolic acidosis reasons for this consultation.  Prior to admission the patient was taking Diazepam, Alprazolam, Lithium, and Fluoxetine. Problems Resolved:    · Lactic acidosis, resolved  · LYNN Stage II, volume responsive pre-renal LYNN (FENa: 0.1%). Urine output >6.9 L in the past 24 hours; resolved. · Respiratory Failure, extubated yesterday, on 3 L O2 NC    IMPRESSION/RECOMMENDATIONS:        1. Rhabdomyolysis, due to prolonged decubitus, drugs; with compartment syndrome; no evidence of LYNN due to pigment. · Continues to resolve with total CK trending down with latest read at 40,290 U/L this morning    2. Acute Encephalopathy 2/2 polysubstance overdose, quite improved. 3. CONS bacteremia, on piperacillin-tazobactam  4. Bipolar 1 disorder with schizophrenia; psychiatry evaluated patient, will transfer to inpatient hospitalization once medically clear. 5. Compartment Leg Syndrome of Right Leg, s/p fasciotomy  6. Transaminitis 2/2 shock liver in the setting of Hep C  7. HCV  8.  Nutrition on clear liquid diet    PLAN:    · Continue LR, increase to 125 cc/hour   · Continue to monitor renal function  · Monitor phosphorus level  · Obtain Ionized calcium  · Continue monitor CPKs       Electronically signed by Sheryn Essex on 8/14/2019 at 8:58 AM

## 2019-08-14 NOTE — PROGRESS NOTES
Department of Internal Medicine  Infectious Diseases   Progress Note      C /C: Aspiration pneumonia,  leukocytosis , fever     Pt is awake and alert  Reports pain in the leg, thigh  Low grade fever     Current Facility-Administered Medications   Medication Dose Route Frequency Provider Last Rate Last Dose    heparin flush 100 UNIT/ML injection             lidocaine 1 % injection 5 mL  5 mL Intradermal Once Martin Gregory MD        sodium chloride flush 0.9 % injection 10 mL  10 mL Intravenous PRN Martin Gregory MD        heparin flush (100 units/mL) injection 100 Units  1 mL Intravenous 2 times per day Martin Gregory MD        heparin flush (100 units/mL) injection 100 Units  1 mL Intracatheter PRN Martin Gregory MD        acetaminophen (TYLENOL) tablet 650 mg  650 mg Oral Q4H PRN Martin Gregory MD        piperacillin-tazobactam (ZOSYN) 3.375 g in dextrose 5 % 100 mL IVPB extended infusion (mini-bag)  3.375 g Intravenous Q8H Bren Rojas MD   Stopped at 08/14/19 0819    ipratropium-albuterol (DUONEB) nebulizer solution 1 ampule  1 ampule Inhalation Q4H WA Martin Gregory MD   1 ampule at 08/14/19 0801    enoxaparin (LOVENOX) injection 40 mg  40 mg Subcutaneous Daily Martin Gregory MD        pantoprazole (PROTONIX) injection 40 mg  40 mg Intravenous Daily Madhuri Strauss MD   40 mg at 08/14/19 0920    And    sodium chloride (PF) 0.9 % injection 10 mL  10 mL Intravenous Daily Camryn Sherman MD   10 mL at 08/14/19 0920    fentaNYL (SUBLIMAZE) injection 25 mcg  25 mcg Intravenous Q2H PRN Nitish Rosenthal MD   25 mcg at 08/13/19 2127    lactated ringers infusion   Intravenous Continuous Arslan Richey  mL/hr at 08/14/19 1142      docusate sodium (COLACE) capsule 100 mg  100 mg Oral BID Taryn Connolly DO   100 mg at 08/13/19 2113    ondansetron (ZOFRAN) injection 4 mg  4 mg Intravenous Q6H PRN Taryn Connolly DO        sodium chloride flush 0.9 % injection 10 mL  10 congestion with thoracic  aortic vascular calcifications. IMPRESSION:     1. Polysubstance abuse   2. Hep C infection   3. Rhabdomyolysis s/p fasciotomy   4. Leukocytosis - reactive   5. Respiratory failure   6. CONS bacteremia       RECOMMENDATIONS:      1. Zosyn 3.375 grams IV q 8 hrs   2.  To OR today

## 2019-08-15 PROBLEM — E44.0 MODERATE PROTEIN-CALORIE MALNUTRITION (HCC): Chronic | Status: ACTIVE | Noted: 2019-08-15

## 2019-08-15 LAB
ALBUMIN SERPL-MCNC: 2.1 G/DL (ref 3.5–5.2)
ALP BLD-CCNC: 41 U/L (ref 40–129)
ALT SERPL-CCNC: 299 U/L (ref 0–40)
ANION GAP SERPL CALCULATED.3IONS-SCNC: 7 MMOL/L (ref 7–16)
ANISOCYTOSIS: ABNORMAL
AST SERPL-CCNC: 700 U/L (ref 0–39)
BASOPHILS ABSOLUTE: 0 E9/L (ref 0–0.2)
BASOPHILS RELATIVE PERCENT: 0.1 % (ref 0–2)
BILIRUB SERPL-MCNC: 0.6 MG/DL (ref 0–1.2)
BUN BLDV-MCNC: 15 MG/DL (ref 6–20)
CALCIUM SERPL-MCNC: 7.8 MG/DL (ref 8.6–10.2)
CHLORIDE BLD-SCNC: 102 MMOL/L (ref 98–107)
CO2: 29 MMOL/L (ref 22–29)
CREAT SERPL-MCNC: 0.9 MG/DL (ref 0.7–1.2)
EOSINOPHILS ABSOLUTE: 0 E9/L (ref 0.05–0.5)
EOSINOPHILS RELATIVE PERCENT: 0 % (ref 0–6)
GFR AFRICAN AMERICAN: >60
GFR NON-AFRICAN AMERICAN: >60 ML/MIN/1.73
GLUCOSE BLD-MCNC: 149 MG/DL (ref 74–99)
HCT VFR BLD CALC: 27.1 % (ref 37–54)
HEMOGLOBIN: 9.1 G/DL (ref 12.5–16.5)
LYMPHOCYTES ABSOLUTE: 0.24 E9/L (ref 1.5–4)
LYMPHOCYTES RELATIVE PERCENT: 2.6 % (ref 20–42)
MCH RBC QN AUTO: 31.8 PG (ref 26–35)
MCHC RBC AUTO-ENTMCNC: 33.6 % (ref 32–34.5)
MCV RBC AUTO: 94.8 FL (ref 80–99.9)
MONOCYTES ABSOLUTE: 0.47 E9/L (ref 0.1–0.95)
MONOCYTES RELATIVE PERCENT: 6.1 % (ref 2–12)
NEUTROPHILS ABSOLUTE: 7.19 E9/L (ref 1.8–7.3)
NEUTROPHILS RELATIVE PERCENT: 91.2 % (ref 43–80)
PDW BLD-RTO: 13.7 FL (ref 11.5–15)
PLATELET # BLD: 190 E9/L (ref 130–450)
PMV BLD AUTO: 10 FL (ref 7–12)
POLYCHROMASIA: ABNORMAL
POTASSIUM REFLEX MAGNESIUM: 4.1 MMOL/L (ref 3.5–5)
RBC # BLD: 2.86 E12/L (ref 3.8–5.8)
SODIUM BLD-SCNC: 138 MMOL/L (ref 132–146)
TOTAL CK: ABNORMAL U/L (ref 20–200)
TOTAL CK: ABNORMAL U/L (ref 20–200)
TOTAL PROTEIN: 5 G/DL (ref 6.4–8.3)
WBC # BLD: 7.9 E9/L (ref 4.5–11.5)

## 2019-08-15 PROCEDURE — 6360000002 HC RX W HCPCS: Performed by: INTERNAL MEDICINE

## 2019-08-15 PROCEDURE — 82550 ASSAY OF CK (CPK): CPT

## 2019-08-15 PROCEDURE — 94640 AIRWAY INHALATION TREATMENT: CPT

## 2019-08-15 PROCEDURE — 6370000000 HC RX 637 (ALT 250 FOR IP): Performed by: STUDENT IN AN ORGANIZED HEALTH CARE EDUCATION/TRAINING PROGRAM

## 2019-08-15 PROCEDURE — 97530 THERAPEUTIC ACTIVITIES: CPT

## 2019-08-15 PROCEDURE — 2060000000 HC ICU INTERMEDIATE R&B

## 2019-08-15 PROCEDURE — 80053 COMPREHEN METABOLIC PANEL: CPT

## 2019-08-15 PROCEDURE — 36592 COLLECT BLOOD FROM PICC: CPT

## 2019-08-15 PROCEDURE — 97168 OT RE-EVAL EST PLAN CARE: CPT

## 2019-08-15 PROCEDURE — 2700000000 HC OXYGEN THERAPY PER DAY

## 2019-08-15 PROCEDURE — 2580000003 HC RX 258: Performed by: INTERNAL MEDICINE

## 2019-08-15 PROCEDURE — 97535 SELF CARE MNGMENT TRAINING: CPT

## 2019-08-15 PROCEDURE — C9113 INJ PANTOPRAZOLE SODIUM, VIA: HCPCS | Performed by: INTERNAL MEDICINE

## 2019-08-15 PROCEDURE — 36415 COLL VENOUS BLD VENIPUNCTURE: CPT

## 2019-08-15 PROCEDURE — 97164 PT RE-EVAL EST PLAN CARE: CPT

## 2019-08-15 PROCEDURE — 6370000000 HC RX 637 (ALT 250 FOR IP): Performed by: INTERNAL MEDICINE

## 2019-08-15 PROCEDURE — 2580000003 HC RX 258: Performed by: STUDENT IN AN ORGANIZED HEALTH CARE EDUCATION/TRAINING PROGRAM

## 2019-08-15 PROCEDURE — 85025 COMPLETE CBC W/AUTO DIFF WBC: CPT

## 2019-08-15 PROCEDURE — 97162 PT EVAL MOD COMPLEX 30 MIN: CPT

## 2019-08-15 RX ORDER — CALCIUM CARBONATE 200(500)MG
500 TABLET,CHEWABLE ORAL 3 TIMES DAILY PRN
Status: DISCONTINUED | OUTPATIENT
Start: 2019-08-15 | End: 2019-08-28 | Stop reason: HOSPADM

## 2019-08-15 RX ORDER — SODIUM CHLORIDE, SODIUM LACTATE, POTASSIUM CHLORIDE, CALCIUM CHLORIDE 600; 310; 30; 20 MG/100ML; MG/100ML; MG/100ML; MG/100ML
INJECTION, SOLUTION INTRAVENOUS CONTINUOUS
Status: ACTIVE | OUTPATIENT
Start: 2019-08-15 | End: 2019-08-16

## 2019-08-15 RX ADMIN — FENTANYL CITRATE 25 MCG: 0.05 INJECTION, SOLUTION INTRAMUSCULAR; INTRAVENOUS at 16:02

## 2019-08-15 RX ADMIN — SODIUM CHLORIDE, POTASSIUM CHLORIDE, SODIUM LACTATE AND CALCIUM CHLORIDE: 600; 310; 30; 20 INJECTION, SOLUTION INTRAVENOUS at 00:42

## 2019-08-15 RX ADMIN — DOCUSATE SODIUM 100 MG: 100 CAPSULE, LIQUID FILLED ORAL at 20:55

## 2019-08-15 RX ADMIN — FENTANYL CITRATE 25 MCG: 0.05 INJECTION, SOLUTION INTRAMUSCULAR; INTRAVENOUS at 11:55

## 2019-08-15 RX ADMIN — Medication 10 ML: at 08:41

## 2019-08-15 RX ADMIN — OXYCODONE HYDROCHLORIDE 10 MG: 10 TABLET ORAL at 21:05

## 2019-08-15 RX ADMIN — FENTANYL CITRATE 25 MCG: 0.05 INJECTION, SOLUTION INTRAMUSCULAR; INTRAVENOUS at 09:38

## 2019-08-15 RX ADMIN — FENTANYL CITRATE 25 MCG: 0.05 INJECTION, SOLUTION INTRAMUSCULAR; INTRAVENOUS at 02:05

## 2019-08-15 RX ADMIN — SODIUM CHLORIDE, POTASSIUM CHLORIDE, SODIUM LACTATE AND CALCIUM CHLORIDE: 600; 310; 30; 20 INJECTION, SOLUTION INTRAVENOUS at 08:50

## 2019-08-15 RX ADMIN — Medication 10 ML: at 20:55

## 2019-08-15 RX ADMIN — PIPERACILLIN SODIUM AND TAZOBACTAM SODIUM 3.38 G: 3; .375 INJECTION, POWDER, LYOPHILIZED, FOR SOLUTION INTRAVENOUS at 10:36

## 2019-08-15 RX ADMIN — IPRATROPIUM BROMIDE AND ALBUTEROL SULFATE 1 AMPULE: .5; 3 SOLUTION RESPIRATORY (INHALATION) at 11:49

## 2019-08-15 RX ADMIN — ENOXAPARIN SODIUM 40 MG: 40 INJECTION SUBCUTANEOUS at 08:42

## 2019-08-15 RX ADMIN — OXYCODONE HYDROCHLORIDE 5 MG: 5 TABLET ORAL at 16:59

## 2019-08-15 RX ADMIN — IPRATROPIUM BROMIDE AND ALBUTEROL SULFATE 1 AMPULE: .5; 3 SOLUTION RESPIRATORY (INHALATION) at 07:43

## 2019-08-15 RX ADMIN — PIPERACILLIN SODIUM AND TAZOBACTAM SODIUM 3.38 G: 3; .375 INJECTION, POWDER, LYOPHILIZED, FOR SOLUTION INTRAVENOUS at 20:55

## 2019-08-15 RX ADMIN — OXYCODONE HYDROCHLORIDE 5 MG: 5 TABLET ORAL at 08:41

## 2019-08-15 RX ADMIN — OXYCODONE HYDROCHLORIDE 5 MG: 5 TABLET ORAL at 13:05

## 2019-08-15 RX ADMIN — IPRATROPIUM BROMIDE AND ALBUTEROL SULFATE 1 AMPULE: .5; 3 SOLUTION RESPIRATORY (INHALATION) at 15:36

## 2019-08-15 RX ADMIN — PIPERACILLIN SODIUM AND TAZOBACTAM SODIUM 3.38 G: 3; .375 INJECTION, POWDER, LYOPHILIZED, FOR SOLUTION INTRAVENOUS at 03:30

## 2019-08-15 RX ADMIN — PANTOPRAZOLE SODIUM 40 MG: 40 INJECTION, POWDER, FOR SOLUTION INTRAVENOUS at 08:45

## 2019-08-15 RX ADMIN — SODIUM CHLORIDE, POTASSIUM CHLORIDE, SODIUM LACTATE AND CALCIUM CHLORIDE: 600; 310; 30; 20 INJECTION, SOLUTION INTRAVENOUS at 16:59

## 2019-08-15 ASSESSMENT — PAIN DESCRIPTION - ORIENTATION
ORIENTATION: RIGHT

## 2019-08-15 ASSESSMENT — PAIN DESCRIPTION - PAIN TYPE
TYPE: ACUTE PAIN
TYPE: SURGICAL PAIN

## 2019-08-15 ASSESSMENT — PAIN DESCRIPTION - ONSET
ONSET: GRADUAL
ONSET: ON-GOING

## 2019-08-15 ASSESSMENT — PAIN DESCRIPTION - PROGRESSION: CLINICAL_PROGRESSION: NOT CHANGED

## 2019-08-15 ASSESSMENT — PAIN DESCRIPTION - LOCATION
LOCATION: LEG

## 2019-08-15 ASSESSMENT — PAIN DESCRIPTION - DESCRIPTORS
DESCRIPTORS: ACHING;DISCOMFORT;SHARP;SORE
DESCRIPTORS: ACHING;DISCOMFORT;SHARP;SORE
DESCRIPTORS: ACHING;CONSTANT;DISCOMFORT

## 2019-08-15 ASSESSMENT — PAIN - FUNCTIONAL ASSESSMENT
PAIN_FUNCTIONAL_ASSESSMENT: PREVENTS OR INTERFERES SOME ACTIVE ACTIVITIES AND ADLS
PAIN_FUNCTIONAL_ASSESSMENT: PREVENTS OR INTERFERES SOME ACTIVE ACTIVITIES AND ADLS

## 2019-08-15 ASSESSMENT — PAIN SCALES - GENERAL
PAINLEVEL_OUTOF10: 8
PAINLEVEL_OUTOF10: 10
PAINLEVEL_OUTOF10: 8
PAINLEVEL_OUTOF10: 8
PAINLEVEL_OUTOF10: 0
PAINLEVEL_OUTOF10: 4
PAINLEVEL_OUTOF10: 8
PAINLEVEL_OUTOF10: 8
PAINLEVEL_OUTOF10: 5
PAINLEVEL_OUTOF10: 5

## 2019-08-15 ASSESSMENT — PAIN DESCRIPTION - FREQUENCY
FREQUENCY: INTERMITTENT
FREQUENCY: CONTINUOUS

## 2019-08-15 NOTE — PROGRESS NOTES
Nutrition Assessment    Type and Reason for Visit: Initial    Nutrition Recommendations: Continue current diet, Start ONS    Nutrition Assessment: Pt w/ moderate malnutrition on admit AEB h/o poor po intake & fat/muscle wasting 2/2 polysubstance abuse w/ multiple OD admits. Pt at further risk d/t surgical wound s/p fasciotomy w/ wound vac in place. Will add magic cup TID & Jac BID to aid in recovery. Malnutrition Assessment:  · Malnutrition Status: Meets the criteria for moderate malnutrition  · Context: Chronic illness  · Findings of the 6 clinical characteristics of malnutrition (Minimum of 2 out of 6 clinical characteristics is required to make the diagnosis of moderate or severe Protein Calorie Malnutrition based on AND/ASPEN Guidelines):  1. Energy Intake-Less than or equal to 75% of estimated energy requirement(average), Greater than or equal to 3 months    2. Weight Loss-No significant weight loss, (x 2 mon RODNEY longterm changes )  3. Fat Loss-Mild subcutaneous fat loss, Orbital  4. Muscle Loss-Mild muscle mass loss, Clavicles (pectoralis and deltoids), Temples (temporalis muscle)  5. Fluid Accumulation-No significant fluid accumulation  6.  Strength-Not measured    Nutrition Risk Level: Moderate    Nutrient Needs:  · Estimated Daily Total Kcal: 9978-9861 (MSJ REE 1593 x 1.3 SF )  · Estimated Daily Protein (g): 100-120(1.5-1.8 g/kg )  · Estimated Daily Total Fluid (ml/day): per critical care     Nutrition Diagnosis:   · Problem:  Moderate malnutrition  · Etiology: related to Insufficient energy/nutrient consumption(2/2 polysubstance abuse)     Signs and symptoms:  as evidenced by Diet history of poor intake, Intake 25-50%, Mild loss of subcutaneous fat, Mild muscle loss    Objective Information:  · Nutrition-Focused Physical Findings: Pt alert s/p extubation, confused/poor historian, sitter at bedside, s/p drug OD, +I/O's 7L, +1/+2 edema, RLE compartment syndrome s/p fasciotomy w/ wound vac in

## 2019-08-15 NOTE — OP NOTE
as  outlined. DESCRIPTION OF PROCEDURE:  The patient was brought to the operating  suite, placed on the operating table in supine position, received  general anesthetic by the Department of Anesthesia as well as 2 gm of  Ancef. Right lower extremity was sterilely prepped and draped out in  standard sterile fashion using Betadine scrub after the previous wound  VAC had been removed. Surgical timeout was performed per protocol by  all members of the surgical team.  Procedure now began with inspection  of the compartments of the leg. There was significant discoloration and  edema to the muscular tissues of all four compartments including  anterolateral, deep posterior, and superficial posterior compartments. There was no contractility to any visible muscle bellies with  electrocautery stimulation. There was some capacity to bleed; however,  this was limited. There were multiple regions of poor consistency of  muscular tissue as well as discoloration with a yellowish tan color as  well as regions of a dark brown. The obvious necrotic muscle was now  debrided using curettes, rongeurs, scalpel, and dissecting scissors to  excise and remove the obvious necrotic muscular tissue to all  compartments including anterior, lateral, superficial, and deep  posterior compartments. The most severe compartments were the anterior  and lateral compartments. A total of 182 sq. cm. of necrotic muscle was  debrided pertaining to both wounds. After we felt we had all the obvious tissue excised, all wounds were  thoroughly irrigated as were the muscular tissues with pulsatile lavage  using multiple liters. Any tissue of questionable integrity and  viability was left intact as the patient will require further  intervention in the future and we can undertake a better look at that  time. Adaptic was now applied to the muscular tissue. Wound VAC  sponges were applied.   Vessel loops were applied with staples for  re-tensioning of the open wound. Wound VACs were now set to suction and  seal.  The patient was now awoken from anesthetic uneventfully,  transferred onto the [facility] back to the medical intensive care  unit in guarded condition. POSTOPERATIVE PLANS:  The patient will be continued on postoperative  antibiotics. He is to maintain his wound VAC. Therapy can work with  him to work on passive motion for the ankle and foot. Anticipate return  to the OR in the next five to seven days for repeat look, I and D of the  leg.         Alek Chavez DO    D: 08/14/2019 15:24:17       T: 08/14/2019 15:31:30     [email or web address]  Job#: [id number]     Doc#: [id number]    CC:

## 2019-08-15 NOTE — PROGRESS NOTES
mL at 08/15/19 0841    fentaNYL (SUBLIMAZE) injection 25 mcg  25 mcg Intravenous Q2H PRN Louie Thomas MD   25 mcg at 08/15/19 1155    lactated ringers infusion   Intravenous Continuous Jeffrey Hernandez  mL/hr at 08/15/19 1156      ondansetron (ZOFRAN) injection 4 mg  4 mg Intravenous Q6H PRN Asa Sake, DO        magnesium hydroxide (MILK OF MAGNESIA) 400 MG/5ML suspension 30 mL  30 mL Oral Daily PRN Madhuri Perales MD           REVIEW OF SYSTEMS:      CONSTITUTIONAL:  Denies fever     HEENT: denies blurring of vision or double vision, denies hearing problem  RESPIRATORY: cough   CARDIOVASCULAR:  Denies palpitation  GASTROINTESTINAL:  Denies abdomen pain, diarrhea or constipation. GENITOURINARY:  Denies burning urination or frequency of urination  INTEGUMENT: denies wound , rash  HEMATOLOGIC/LYMPHATIC:  Denies lymph node swelling, gum bleeding or easy bruising. MUSCULOSKELETAL: leg pain   NEUROLOGICAL:  Awake and alert       PHYSICAL EXAM:      Vitals:     BP (!) 98/56   Pulse 99   Temp 98.4 °F (36.9 °C) (Core)   Resp 17   Ht 5' 7\" (1.702 m)   Wt 177 lb 4.8 oz (80.4 kg) Comment: without wound vac  SpO2 92%   BMI 27.77 kg/m²       General Appearance:    Awake, intubated    Head:    Normocephalic, atraumatic   Eyes:    No pallor, no icterus,   Ears:    No obvious deformity or drainage.    Nose:   No nasal drainage   Throat:   Mucosa moist, no oral thrush   Neck:   Supple, no lymphadenopathy   Back:     no CVA tenderness   Lungs:     Scattered rhonchi    Heart:    Regular rate and rhythm, no murmur, rub or gallop   Abdomen:     Soft, non-tender, bowel sounds present    Extremities:    Right leg wound VAC    Pulses:   Dorsalis pedis palpable    Skin:   Wound right leg, blisters      CBC with Differential:      Lab Results   Component Value Date    WBC 7.9 08/15/2019    RBC 2.86 08/15/2019    HGB 9.1 08/15/2019    HCT 27.1 08/15/2019     08/15/2019    MCV 94.8 08/15/2019    MCH 31.8

## 2019-08-15 NOTE — ANESTHESIA POSTPROCEDURE EVALUATION
Department of Anesthesiology  Postprocedure Note    Patient: Kristine Le  MRN: [id number]  YOB: 1965  Date of evaluation: 8/15/2019  Time:  6:43 AM     Procedure Summary     Date:  08/14/19 Room / Location:  [facility]    Anesthesia Start:  1240 Anesthesia Stop:  0552    Procedure:  RIGHT LOWER EXTREMITY IRRIGATION  AND DEBRIDEMENT,  WOUND VAC CHANGE (Right Leg Lower) Diagnosis:  (WOUND RIGHT LOWER EXTREMITY)    Surgeon:  Chris Reed DO Responsible Provider:  Maria R Stein MD    Anesthesia Type:  general ASA Status:  3          Anesthesia Type: No value filed. Jordan Phase I: Jodran Score: 8    Jordan Phase II:      Last vitals: Reviewed and per EMR flowsheets.        Anesthesia Post Evaluation    Patient location during evaluation: PACU  Patient participation: complete - patient participated  Level of consciousness: awake and alert  Airway patency: patent  Nausea & Vomiting: no nausea and no vomiting  Complications: no  Cardiovascular status: hemodynamically stable and blood pressure returned to baseline  Respiratory status: acceptable  Hydration status: euvolemic

## 2019-08-15 NOTE — PROGRESS NOTES
[facility]  Internal Medicine Residency Program  Progress note    Patient:  Babita Butcher 48 y.o. male MRN: [id number]     Date of Service: 8/15/2019    Hospital Day: 5      Chief complaint: f/u AMS and rhabdomyolysis  Subjective    Patient seen and examined this morning. Patient awake ,alert, following command appropriately. Denies any chest pain ,SOB ,but does report right leg pain. 24 hour changes:  -currently on NC at 2L  -afebrile  -s/p right leg fasciotomy for right leg compartment syndrome 8/11,  repeat I and D on 8/14  -CK trending down with adequate urine output    -Patient seen by psychiatry consider transfer to psychiatry after medically stable. -Per ortho plan for repeat I and D with in 5-7 days  -per ID to continue zosyn    Physical Exam   · Vitals: /85   Pulse 102   Temp 98.4 °F (36.9 °C) (Core)   Resp 13   Ht 5' 7\" (1.702 m)   Wt 177 lb 4.8 oz (80.4 kg) Comment: without wound vac  SpO2 96%   BMI 27.77 kg/m²     · General Appearance: currently in NC , alert, awake ,following command command ,awake off sedation  · Skin: warm and dry, no rash or erythema  · Head: normocephalic and atraumatic  · Pulmonary/Chest: coarse bilateral breath sounds   · Cardiovascular: normal rate, normal S1 and S2, no gallops, intact distal pulses and no carotid bruits  · Abdomen: soft, non-tender, non-distended, normal bowel sounds, no masses or organomegaly  · Extremities: no cyanosis and no clubbing, R leg wrapped, non-soaked, (+) bilateral pedal pulses, wound vac on place,blister in right foot , increased tenderness and tensed right thigh, able to move right thigh.   · Musculoskeletal: normal range of motion, no joint swelling, deformity or tenderness  · Neurologic:alertable to follow command ,awake        Oxygen  NC :2L/min, wean as tolerate    ABG:     Lab Results   Component Value Date    PHART 7.399 07/06/2011    PH 7.365 08/12/2019    PH 7.279 10/05/2010    PCO2 36.5 08/12/2019 included one of following options; 1 . Automated exposure control, 2. Adjustment of MA and or KV according to patient's size or 3. Use of iterative reconstruction. FINDINGS: Bony structures are intact with relative preservation of alignment and joint spacing throughout. Degenerative changes of the cervical spine are associated with mild kyphotic curvature between the C5 and C7 levels. Individual vertebrae and facets show no evidence of acute traumatic disruption or bony displacement. There is no paraspinous soft tissue swelling. Pulmonary apical region show no acute findings, and at the skull base, mastoid sinuses and middle ear cavities appear normally aerated. Orotracheal and orogastric tubes are present. They extend beneath the lower margin of the study, but show very prominent tortuosity in the jimmy and hypopharynx of the orogastric tube. IMPRESSION:  No evidence of acute cervical bony or adjacent soft tissue traumatic findings. The orogastric tube is extremely redundant in the neck region. Note: Emergency department was notified at the time of this dictation of the redundant orogastric tube appearance by myself. Xr Chest Portable    Result Date: 8/11/2019  Location:200 Exam: XR CHEST PORTABLE Indications: Altered mental status Findings: A portable AP supine view of the chest was obtained and compared to the previous exam of 6/14/2019. Distal tip of endotracheal tube is located approximately 2.0 cm above the level of the tracheal bifurcation. There is elevation of the right hemidiaphragm, which has increased since previous study. There is mild patchy increased density within the right suprahilar region centrally and within the right infrahilar region, extending to the medial right lung base. There is increased interstitial prominence within the left infrahilar region, extending into the medial left lung base. No pleural effusion or pneumothorax is seen bilaterally.  Cardiac silhouette and osseous structures are

## 2019-08-15 NOTE — PROGRESS NOTES
Comprehension: F+   Problem solving: P+   Judgement/safety: P+ continued reminders for NWB RLE during transfers and standing portions of ADL routine     RASS: 0  CAM-ICU: NT     Functional Assessment:   Initial Eval Status  Date: 8/15/19 Treatment Status  Date: Short Term Goals  Treatment frequency: 1-3x/week on ICU; PRN on stepdown unit  -pt will improve. .. Feeding Indep         Grooming Min A  Standing at sink with ww; mod VC to maintain RLE NWB restrictions. Educated on [employer] prevention techniques (prepping task seated and maintaining 1 hand support on ww/sink for balance)     Mod I   while seated;  / standing with device prn     UB Dressing SBA  Seated arm chair changing gown    Mod I; including clothing retrieval   LB Dressing Mod A overall  Educated on AE use (reacher/sock aide) with Min A and min VC for proper technique; Min A dyn standing balance during simulation of CM    Mod I   with AE/device PRN; including clothing retrieval   Bathing UB-  SBA  LB-  Max A  simulation    UB-  Setup  LB-  SBA with AE/DME prn   Toileting Mod A  Ed; Min A dyn balance ww level during simulated tasks; recommend BSC to RN     Mod I  including clothing mgmt and toileting hygiene using DME/device prn   Bed Mobility  Supine to sit: Min A  Sit to supine: NT  Rolling: NT   Mod I   in prep for EOB ADL tasks   Functional/  Bathroom  Transfers Sit to stand: Min A  Stand to sit: Min A  Stand pivot: Min A ww; VC for NWB RLE   Mod I  from varying surfaces using device/DME prn with G safety   Functional Mobility Min A  Short distances in room to/from sink and into hallway using ww, hopping on LLE and mod VC for RLE NWB   Mod I   Household distance using device prn   Balance Sitting:     Static:  Sup    Dynamic:SBA  Standing:     Static:  Min A ww    Dynamic:Min A ww  demo Indep dynamic sitting balance EOB during ADL tasks;  Mod I dynamic standing balance during ADL tasks using device prn   Endurance/  Activity Tolerance F activity tolerance/endurance during light ADL task     Standing tolerance 5-7 minutes  demo G activity tolerance/endurance during [id number] min ADL task     G demo of EC, pacing and proper breathing techniques   Visual/  Perceptual Glasses: None    Reports no change in vision; acuity WNL able to tell time on wall clock and read whiteboard for orientation questions        Safety P+      G    BUE strength/endurance See below       Hand dominance: right       Strength ROM  Comments   RUE  Grossly 4+/5 WFL F+   F+ FMC/dexterity noted during ADL tasks   LUE Grossly 4+/5 WFL F+   F+ FMC/dexterity noted during ADL tasks     Hearing: Appears WFL   Sensation: Pt reports numbness/tingling in R foot/toes. Tone: WNL  Edema: mild R foot edema    Vitals:   BP at rest: 102/62 BP at end of session: 104/58   HR at rest: 96 bpm HR at end of session: 95 bpm   Spo2 at rest:95% 3L  Spo2 at end of session: 96%3L                             Comments/Treatment Narrative: Thorough chart review and re-evaluation completed with PT collaboration. Upon arrival patient supine with HOB slightly elevated. Patient agreeable to session. Vitals monitored continuously during session. Extensive line mgmt required. Pt re-educated on NWB RLE with G verbal understanding however required moderate VC and reminders to maintain during transfers and standing portions of ADL routine using ww. Pt performed supine to sit EOB, LB dressing with AE education, STS and pivot transfer to arm chair, ambulation short distances in room to hallway and standing at sink for grooming tasks as stated above. Returned to bedside arm chair for UB dressing as stated above. Pt left sitting up in chair with RLE elevated on stool with pillows for edema mgmt; all devices within reach, all lines and tubes intact, nursing notified of pt status- recommend BSC. Pt required cues and education as noted above for safe facilitation and completion of tasks.  During functional activites and ADLs pt educated on RLE NWB precautions, proper hand placement during STS and when standing at ww level, safety techniques, sequencing, posture and energy conservation/pacing/breathing techniques. Pt additionally educated on OT POC, OT role, importance of EOB/OOB activity and completing ADL tasks daily as IND as possible to aide in recovery process, fall risk precautions/call light use and proper RLE positioning for edema mgmt. Therapist provided skilled monitoring of HR, O2 saturation, blood pressure and patients response during treatment session. Prior to and at the end of session, environmental modifications/line management completed for patients safety and efficiency of treatment session. Re-eval d/t recent procedure on 8/14/19     Assessment of current deficits:   Functional mobility [x]  ADLs [x] Strength [x]  Cognition [x]  Functional transfers  [x] IADLs [x] Safety Awareness [x]  Endurance [x]  Fine Motor Coordination [] Balance [x] Vision/perception [] Sensation [x]   Gross Motor Coordination [] ROM [] Delirium []                  Motor Control []    Plan of Care:  ADL retraining [x]   Equipment needs [x]   Neuromuscular re-education [x] Energy Conservation Techniques [x]  Functional Transfer training [x] Patient and/or Family Education [x]  Functional Mobility training [x]  Environmental Modifications [x]  Cognitive re-training [x]   Compensatory techniques for ADLs [x]  Splinting Needs []   Positioning to improve overall function [x]   Therapeutic Activity [x]                       Therapeutic Exercise  [x]  Visual/Perceptual: []    Delirium prevention/treatment  [x]   Other:  []    Rehab Potential: Good for established goals    Patient / Family Goal: None stated     Patient and/or family were instructed/educated on diagnosis, prognosis/goals and plan of care. Demonstrated G understanding, further information not needed.     [] Malnutrition indicators have been identified and nursing

## 2019-08-15 NOTE — PROGRESS NOTES
Component Value Date     08/15/2019    K 4.1 08/15/2019     08/15/2019    CO2 29 08/15/2019    BUN 15 08/15/2019    CREATININE 0.9 08/15/2019    GFRAA >60 08/15/2019    LABGLOM >60 08/15/2019    GLUCOSE 149 08/15/2019    GLUCOSE 95 07/08/2011    PROT 5.0 08/15/2019    LABALBU 2.1 08/15/2019    LABALBU 3.6 07/08/2011    CALCIUM 7.8 08/15/2019    BILITOT 0.6 08/15/2019    ALKPHOS 41 08/15/2019     08/15/2019     08/15/2019     Magnesium:    Lab Results   Component Value Date    MG 2.2 08/12/2019     Phosphorus:    Lab Results   Component Value Date    PHOS 2.5 08/14/2019       UA Protein: 30  Urine Chloride: <20  Urine Creatinine: 170  Urine Osmolality: 513  Urine Potassium: 59.0  Urine Sodium: <20  FeNA: 0.1%    Total CK 8/12/19 1800: 103,200 U/L  Total CK 8/12/19 2140:  83,880 U/L  Total CK 8/13/19 0525: 66,180 U/L  Total CK 8/13/19 1251: 52,590 U/L  Total CK 8/13/19 1658: 43,890 U/L  Total CK 8/13/19 2337: 43,590 U/L  Total CK 8/13/19 0430: 40,290 U/L    Radiology Review:    CXR 08/14/2019   No acute process       BRIEF SUMMARY OF INITIAL CONSULT:    Abdifatah St is 48 y.o. male with history of polysubstance abuse, HCV, bipolar 1 disorder, and schizophrenia, who was admitted on 8/11/2019 by EMS who found him on the floor of his home. The patient was unresponsive, but administration of Narcan slightly improved his mentation. Upon arrival to the ER, he was found to have AMS and right lower leg compartment syndrome. He was brought into surgery to relieve the pressure. Urine drug screen showed positivity for benzos, cocaine, methadone, and amphetamine. He had an elevated creatinine level of 2 mg/dL as well as metabolic acidosis reasons for this consultation. Prior to admission the patient was taking Diazepam, Alprazolam, Lithium, and Fluoxetine. Problems Resolved:    · Lactic acidosis, resolved  · LYNN Stage II, volume responsive pre-renal LYNN (FENa: 0.1%).  Urine output >6.9 L in the past 24 hours; resolved. · Respiratory Failure, extubated, on 2 L O2 NC    IMPRESSION/RECOMMENDATIONS:        1. Rhabdomyolysis, due to prolonged decubitus, drugs; with compartment syndrome; no evidence of LYNN due to pigment. · Continues to resolve with total CK trending down with latest read at 17,111 U/L this morning    2. Acute Encephalopathy 2/2 polysubstance overdose, quite improved. 3. CONS bacteremia, on piperacillin-tazobactam  4. Bipolar 1 disorder with schizophrenia; psychiatry evaluated patient, will transfer to inpatient hospitalization once medically clear. 5. Compartment Leg Syndrome of Right Leg, s/p fasciotomy,wound vac  6. Transaminitis 2/2 shock liver in the setting of Hep C  7. HCV  8.  Nutrition on clear liquid diet    PLAN:    · Continue LR, increase to 125 cc/hour   · Continue to monitor renal function  · Monitor phosphorus level  · Obtain Ionized calcium  · Continue monitor CPKs       Electronically signed by Rose Sparks MD on 8/15/2019 at 9:30 AM

## 2019-08-15 NOTE — PROGRESS NOTES
[facility] Quality Flow/Interdisciplinary Rounds Progress Note        Quality Flow Rounds held on August 15, 2019    Disciplines Attending:  Bedside Nurse, , , Nursing Unit Leadership and Physical Therapy    Cece Mukherjee was admitted on 8/11/2019  3:05 PM    Anticipated Discharge Date:  Expected Discharge Date: 08/18/19    Disposition:    Dylan Score:  Dylan Scale Score: 13    Readmission Risk              Risk of Unplanned Readmission:        30           Discussed patient goal for the day, patient clinical progression, and barriers to discharge. The following Goal(s) of the Day/Commitment(s) have been identified:  Transfer to [facility].        Ericka Davis  August 15, 2019

## 2019-08-16 LAB
ANION GAP SERPL CALCULATED.3IONS-SCNC: 12 MMOL/L (ref 7–16)
ANISOCYTOSIS: ABNORMAL
BASOPHILS ABSOLUTE: 0.01 E9/L (ref 0–0.2)
BASOPHILS RELATIVE PERCENT: 0.1 % (ref 0–2)
BLOOD CULTURE, ROUTINE: NORMAL
BUN BLDV-MCNC: 22 MG/DL (ref 6–20)
CALCIUM SERPL-MCNC: 8.1 MG/DL (ref 8.6–10.2)
CHLORIDE BLD-SCNC: 105 MMOL/L (ref 98–107)
CO2: 26 MMOL/L (ref 22–29)
CREAT SERPL-MCNC: 0.9 MG/DL (ref 0.7–1.2)
EOSINOPHILS ABSOLUTE: 0 E9/L (ref 0.05–0.5)
EOSINOPHILS RELATIVE PERCENT: 0 % (ref 0–6)
GFR AFRICAN AMERICAN: >60
GFR NON-AFRICAN AMERICAN: >60 ML/MIN/1.73
GLUCOSE BLD-MCNC: 116 MG/DL (ref 74–99)
HCT VFR BLD CALC: 27.1 % (ref 37–54)
HEMOGLOBIN: 8.9 G/DL (ref 12.5–16.5)
IMMATURE GRANULOCYTES #: 0.05 E9/L
IMMATURE GRANULOCYTES %: 0.6 % (ref 0–5)
LYMPHOCYTES ABSOLUTE: 0.78 E9/L (ref 1.5–4)
LYMPHOCYTES RELATIVE PERCENT: 9.6 % (ref 20–42)
MCH RBC QN AUTO: 31.3 PG (ref 26–35)
MCHC RBC AUTO-ENTMCNC: 32.8 % (ref 32–34.5)
MCV RBC AUTO: 95.4 FL (ref 80–99.9)
MONOCYTES ABSOLUTE: 0.88 E9/L (ref 0.1–0.95)
MONOCYTES RELATIVE PERCENT: 10.8 % (ref 2–12)
NEUTROPHILS ABSOLUTE: 6.4 E9/L (ref 1.8–7.3)
NEUTROPHILS RELATIVE PERCENT: 78.9 % (ref 43–80)
PDW BLD-RTO: 13.7 FL (ref 11.5–15)
PLATELET # BLD: 204 E9/L (ref 130–450)
PMV BLD AUTO: 10.3 FL (ref 7–12)
POLYCHROMASIA: ABNORMAL
POTASSIUM REFLEX MAGNESIUM: 4.2 MMOL/L (ref 3.5–5)
RBC # BLD: 2.84 E12/L (ref 3.8–5.8)
SODIUM BLD-SCNC: 143 MMOL/L (ref 132–146)
TOTAL CK: ABNORMAL U/L (ref 20–200)
WBC # BLD: 8.1 E9/L (ref 4.5–11.5)

## 2019-08-16 PROCEDURE — 2580000003 HC RX 258: Performed by: INTERNAL MEDICINE

## 2019-08-16 PROCEDURE — 6370000000 HC RX 637 (ALT 250 FOR IP): Performed by: INTERNAL MEDICINE

## 2019-08-16 PROCEDURE — 36415 COLL VENOUS BLD VENIPUNCTURE: CPT

## 2019-08-16 PROCEDURE — 82550 ASSAY OF CK (CPK): CPT

## 2019-08-16 PROCEDURE — 80048 BASIC METABOLIC PNL TOTAL CA: CPT

## 2019-08-16 PROCEDURE — 97606 NEG PRS WND THER DME>50 SQCM: CPT

## 2019-08-16 PROCEDURE — 6360000002 HC RX W HCPCS: Performed by: INTERNAL MEDICINE

## 2019-08-16 PROCEDURE — C9113 INJ PANTOPRAZOLE SODIUM, VIA: HCPCS | Performed by: INTERNAL MEDICINE

## 2019-08-16 PROCEDURE — 94664 DEMO&/EVAL PT USE INHALER: CPT

## 2019-08-16 PROCEDURE — 94640 AIRWAY INHALATION TREATMENT: CPT

## 2019-08-16 PROCEDURE — 85025 COMPLETE CBC W/AUTO DIFF WBC: CPT

## 2019-08-16 PROCEDURE — 2060000000 HC ICU INTERMEDIATE R&B

## 2019-08-16 RX ORDER — AMOXICILLIN AND CLAVULANATE POTASSIUM 562.5; 437.5; 62.5 MG/1; MG/1; MG/1
2 TABLET, FILM COATED, EXTENDED RELEASE ORAL EVERY 12 HOURS SCHEDULED
Status: DISCONTINUED | OUTPATIENT
Start: 2019-08-16 | End: 2019-08-20

## 2019-08-16 RX ADMIN — Medication 10 ML: at 09:42

## 2019-08-16 RX ADMIN — AMOXICILLIN AND CLAVULANATE POTASSIUM 2 TABLET: 562.5; 437.5; 62.5 TABLET, MULTILAYER, EXTENDED RELEASE ORAL at 20:22

## 2019-08-16 RX ADMIN — ENOXAPARIN SODIUM 40 MG: 40 INJECTION SUBCUTANEOUS at 09:42

## 2019-08-16 RX ADMIN — SODIUM CHLORIDE, POTASSIUM CHLORIDE, SODIUM LACTATE AND CALCIUM CHLORIDE: 600; 310; 30; 20 INJECTION, SOLUTION INTRAVENOUS at 21:21

## 2019-08-16 RX ADMIN — IPRATROPIUM BROMIDE AND ALBUTEROL SULFATE 1 AMPULE: .5; 3 SOLUTION RESPIRATORY (INHALATION) at 17:24

## 2019-08-16 RX ADMIN — IPRATROPIUM BROMIDE AND ALBUTEROL SULFATE 1 AMPULE: .5; 3 SOLUTION RESPIRATORY (INHALATION) at 13:50

## 2019-08-16 RX ADMIN — IPRATROPIUM BROMIDE AND ALBUTEROL SULFATE 1 AMPULE: .5; 3 SOLUTION RESPIRATORY (INHALATION) at 20:47

## 2019-08-16 RX ADMIN — SODIUM CHLORIDE, POTASSIUM CHLORIDE, SODIUM LACTATE AND CALCIUM CHLORIDE: 600; 310; 30; 20 INJECTION, SOLUTION INTRAVENOUS at 04:23

## 2019-08-16 RX ADMIN — OXYCODONE HYDROCHLORIDE 10 MG: 10 TABLET ORAL at 20:22

## 2019-08-16 RX ADMIN — DOCUSATE SODIUM 100 MG: 100 CAPSULE, LIQUID FILLED ORAL at 09:40

## 2019-08-16 RX ADMIN — DOCUSATE SODIUM 100 MG: 100 CAPSULE, LIQUID FILLED ORAL at 20:22

## 2019-08-16 RX ADMIN — PIPERACILLIN SODIUM AND TAZOBACTAM SODIUM 3.38 G: 3; .375 INJECTION, POWDER, LYOPHILIZED, FOR SOLUTION INTRAVENOUS at 11:45

## 2019-08-16 RX ADMIN — OXYCODONE HYDROCHLORIDE 10 MG: 10 TABLET ORAL at 05:26

## 2019-08-16 RX ADMIN — PIPERACILLIN SODIUM AND TAZOBACTAM SODIUM 3.38 G: 3; .375 INJECTION, POWDER, LYOPHILIZED, FOR SOLUTION INTRAVENOUS at 04:15

## 2019-08-16 RX ADMIN — OXYCODONE HYDROCHLORIDE 10 MG: 10 TABLET ORAL at 15:40

## 2019-08-16 RX ADMIN — OXYCODONE HYDROCHLORIDE 10 MG: 10 TABLET ORAL at 09:42

## 2019-08-16 RX ADMIN — PANTOPRAZOLE SODIUM 40 MG: 40 INJECTION, POWDER, FOR SOLUTION INTRAVENOUS at 09:42

## 2019-08-16 RX ADMIN — IPRATROPIUM BROMIDE AND ALBUTEROL SULFATE 1 AMPULE: .5; 3 SOLUTION RESPIRATORY (INHALATION) at 08:30

## 2019-08-16 ASSESSMENT — PAIN DESCRIPTION - DESCRIPTORS
DESCRIPTORS: ACHING;CRAMPING;DISCOMFORT
DESCRIPTORS: ACHING;CRAMPING;DISCOMFORT

## 2019-08-16 ASSESSMENT — PAIN SCALES - GENERAL
PAINLEVEL_OUTOF10: 10
PAINLEVEL_OUTOF10: 9
PAINLEVEL_OUTOF10: 8
PAINLEVEL_OUTOF10: 4
PAINLEVEL_OUTOF10: 2
PAINLEVEL_OUTOF10: 0
PAINLEVEL_OUTOF10: 10

## 2019-08-16 ASSESSMENT — PAIN DESCRIPTION - ORIENTATION
ORIENTATION: RIGHT
ORIENTATION: RIGHT

## 2019-08-16 ASSESSMENT — PAIN DESCRIPTION - ONSET
ONSET: ON-GOING
ONSET: ON-GOING

## 2019-08-16 ASSESSMENT — PAIN DESCRIPTION - PROGRESSION
CLINICAL_PROGRESSION: NOT CHANGED
CLINICAL_PROGRESSION: NOT CHANGED

## 2019-08-16 ASSESSMENT — PAIN DESCRIPTION - LOCATION
LOCATION: LEG
LOCATION: LEG

## 2019-08-16 ASSESSMENT — PAIN DESCRIPTION - PAIN TYPE
TYPE: ACUTE PAIN
TYPE: ACUTE PAIN;SURGICAL PAIN

## 2019-08-16 ASSESSMENT — PAIN DESCRIPTION - FREQUENCY
FREQUENCY: CONTINUOUS
FREQUENCY: CONTINUOUS

## 2019-08-16 ASSESSMENT — PAIN - FUNCTIONAL ASSESSMENT: PAIN_FUNCTIONAL_ASSESSMENT: PREVENTS OR INTERFERES SOME ACTIVE ACTIVITIES AND ADLS

## 2019-08-16 NOTE — PROGRESS NOTES
polysubstance overdose    IMPRESSION/RECOMMENDATIONS:        1. Rhabdomyolysis, due to prolonged decubitus, drugs; with compartment syndrome; no evidence of LYNN due to pigment. · Continues to resolve with total CK trending down with latest read at 17,111 U/L this morning    2. CONS bacteremia, on piperacillin-tazobactam  3. Bipolar 1 disorder with schizophrenia; psychiatry evaluated patient, will transfer to inpatient hospitalization once medically clear. 4. Compartment Leg Syndrome of Right Leg, s/p fasciotomy,wound vac  5. Transaminitis 2/2 shock liver in the setting of Hep C  6.  HCV    PLAN:    · Continue LR, increase to 100 cc/hour   · Continue to monitor renal function  · Continue monitor CPKs       Electronically signed by Carlos Vanessa MD on 8/16/2019 at 10:20 AM

## 2019-08-16 NOTE — PLAN OF CARE
Problem: Risk for Impaired Skin Integrity  Goal: Tissue integrity - skin and mucous membranes  Description  Structural intactness and normal physiological function of skin and  mucous membranes.   Outcome: Met This Shift     Problem: Falls - Risk of:  Goal: Will remain free from falls  Description  Will remain free from falls  Outcome: Met This Shift  Goal: Absence of physical injury  Description  Absence of physical injury  Outcome: Met This Shift     Problem: Pain:  Goal: Pain level will decrease  Description  Pain level will decrease  Outcome: Met This Shift  Goal: Control of acute pain  Description  Control of acute pain  Outcome: Met This Shift  Goal: Control of chronic pain  Description  Control of chronic pain  Outcome: Met This Shift     Problem: Skin Integrity:  Goal: Will show no infection signs and symptoms  Description  Will show no infection signs and symptoms  Outcome: Met This Shift  Goal: Absence of new skin breakdown  Description  Absence of new skin breakdown  Outcome: Met This Shift     Problem: Suicide risk  Goal: Provide patient with safe environment  Description  Provide patient with safe environment  Outcome: Met This Shift

## 2019-08-16 NOTE — FLOWSHEET NOTE
Inpatient Wound Care(follow up)7409    Admit Date: 8/11/2019  3:05 PM    Reason for consult:  Right leg wound vac management    Findings:       08/16/19 1724   Wound 08/11/19 Leg Right; Lower;Medial   Date First Assessed: 08/11/19   Present on Hospital Admission: Yes  Location: Leg  Wound Location Orientation: Right; Lower;Medial   Wound Image    Dressing/Treatment Vacuum dressing   Wound Cleansed Rinsed/Irrigated with saline   Dressing Change Due 08/19/19   Wound Length (cm) 18 cm   Wound Width (cm) 6.6 cm   Wound Depth (cm) 1.6 cm   Wound Surface Area (cm^2) 118.8 cm^2   Wound Volume (cm^3) 190.08 cm^3   Wound Assessment Red  (red shoe string sutures secured with staples)   Drainage Amount Small   Drainage Description Serosanguinous   Odor None   Exposed structure Muscle   Nayana-wound Assessment Red; Other (Comment)  (raw)   Red%Wound Bed 100   Wound 08/11/19 Leg Right; Lower; Lateral   Date First Assessed: 08/11/19   Present on Hospital Admission: Yes  Location: Leg  Wound Location Orientation: Right; Lower; Lateral   Wound Image    Dressing/Treatment Vacuum dressing   Wound Cleansed Rinsed/Irrigated with saline   Wound Length (cm) 17 cm   Wound Width (cm) 4.4 cm   Wound Depth (cm) 1.8 cm   Wound Surface Area (cm^2) 74.8 cm^2   Wound Volume (cm^3) 134.64 cm^3   Wound Assessment Red  (red shoe string sutures secured with staples)   Drainage Amount Moderate   Drainage Description Serosanguinous   Odor None   Exposed structure Muscle   Nayana-wound Assessment Red; Other (Comment)  (raw)   Red%Wound Bed 100   Negative Pressure Wound Therapy Leg Anterior; Lower;Right   Placement Date/Time: 08/11/19 1809   Pre-existing: No  Location: Leg  Wound Location Orientation: Anterior; Lower;Right   $ Standard NPWT >50 sq cm PER TX $ Yes   Wound Type Surgical   Number of pieces used 4  (2 pieces black sponge to each wound)   Cycle Continuous   Target Pressure (mmHg) 125   Canister changed?  No   Dressing Change Due 08/19/19     **Informed

## 2019-08-16 NOTE — PLAN OF CARE
Problem: Suicide risk  Goal: Provide patient with safe environment  Description  Provide patient with safe environment  Outcome: Met This Shift

## 2019-08-16 NOTE — PROGRESS NOTES
Department of Orthopedic Surgery   Progress Note    Patient seen and examined POD#2, from most recent wash out. Sitter at bedside. Pain controlled. No new complaints or paresthesias. Denies chest pain, shortness of breath, calf pain, dizziness/lightheadedness. Denies fevers or chills. Denies N/V/D. Feels sensation in his foot is improving having tingling \"from the knee down\". Patient states he feels swelling is improved as well. VITALS:  /63   Pulse 84   Temp 97.8 °F (36.6 °C) (Temporal)   Resp 18   Ht 5' 7\" (1.702 m)   Wt 182 lb 12.8 oz (82.9 kg)   SpO2 94%   BMI 28.63 kg/m²     GENERAL: AAOx3  MUSCULOSKELETAL:   right lower extremity:  · Wound vac C/D/I, holding suction; dressing CDI  · Compartments firm but compressible.  Thigh is soft and compressible  · Palpable dorsalis pedis and posterior tibialis pulse, brisk cap refill to toes, foot warm and perfused  · Motor and sensation not intact at this time to leg/foot again noted    CBC:   Lab Results   Component Value Date    WBC 8.1 08/16/2019    HGB 8.9 08/16/2019    HCT 27.1 08/16/2019     08/16/2019       ASSESSMENT  · S/P R lower leg fasciotomy 8/11, repeat I&D 8/14    PLAN    · NWB RLE  · Pain control pr primary team  · DVT prophylaxis- per primary team  · Monitor Labs  · Trend H&H  · Will need repeat I&D and secondary closure in the next 5-7 days   · PT/OT  · D/C planning, SW/PT recs  · Continue to follow   · Electronically signed by Yousuf Sands DO on 8/16/2019 at 6:44 AM

## 2019-08-17 LAB
7-AMINOCLONAZEPAM, URINE: <5 NG/ML
ALPHA-HYDROXYALPRAZOLAM, URINE: <5 NG/ML
ALPHA-HYDROXYMIDAZOLAM, URINE: <20 NG/ML
ALPRAZOLAM, URINE: <5 NG/ML
ANION GAP SERPL CALCULATED.3IONS-SCNC: 12 MMOL/L (ref 7–16)
BASOPHILS ABSOLUTE: 0.01 E9/L (ref 0–0.2)
BASOPHILS RELATIVE PERCENT: 0.1 % (ref 0–2)
BLOOD CULTURE, ROUTINE: NORMAL
BUN BLDV-MCNC: 18 MG/DL (ref 6–20)
CALCIUM SERPL-MCNC: 8.2 MG/DL (ref 8.6–10.2)
CHLORDIAZEPOXIDE, URINE: <20 NG/ML
CHLORIDE BLD-SCNC: 100 MMOL/L (ref 98–107)
CLONAZEPAM, URINE: <5 NG/ML
CO2: 28 MMOL/L (ref 22–29)
COCAINE, CONFIRM, URINE: >1000 NG/ML
CREAT SERPL-MCNC: 0.8 MG/DL (ref 0.7–1.2)
DIAZEPAM, URINE: <20 NG/ML
EDDP, URINE: >5000 NG/ML
EOSINOPHILS ABSOLUTE: 0.03 E9/L (ref 0.05–0.5)
EOSINOPHILS RELATIVE PERCENT: 0.4 % (ref 0–6)
GFR AFRICAN AMERICAN: >60
GFR NON-AFRICAN AMERICAN: >60 ML/MIN/1.73
GLUCOSE BLD-MCNC: 103 MG/DL (ref 74–99)
HCT VFR BLD CALC: 30.9 % (ref 37–54)
HEMOGLOBIN: 10.2 G/DL (ref 12.5–16.5)
IMMATURE GRANULOCYTES #: 0.05 E9/L
IMMATURE GRANULOCYTES %: 0.7 % (ref 0–5)
LORAZEPAM, URINE: <20 NG/ML
LYMPHOCYTES ABSOLUTE: 1.88 E9/L (ref 1.5–4)
LYMPHOCYTES RELATIVE PERCENT: 27.6 % (ref 20–42)
MCH RBC QN AUTO: 31.3 PG (ref 26–35)
MCHC RBC AUTO-ENTMCNC: 33 % (ref 32–34.5)
MCV RBC AUTO: 94.8 FL (ref 80–99.9)
METHADONE, URINE: >5000 NG/ML
MIDAZOLAM, URINE: <20 NG/ML
MONOCYTES ABSOLUTE: 0.77 E9/L (ref 0.1–0.95)
MONOCYTES RELATIVE PERCENT: 11.3 % (ref 2–12)
NEUTROPHILS ABSOLUTE: 4.07 E9/L (ref 1.8–7.3)
NEUTROPHILS RELATIVE PERCENT: 59.9 % (ref 43–80)
NORDIAZEPAM, URINE: 821 NG/ML
OXAZEPAM, URINE: 1902 NG/ML
PDW BLD-RTO: 14.2 FL (ref 11.5–15)
PLATELET # BLD: 265 E9/L (ref 130–450)
PMV BLD AUTO: 10 FL (ref 7–12)
POTASSIUM REFLEX MAGNESIUM: 3.8 MMOL/L (ref 3.5–5)
RBC # BLD: 3.26 E12/L (ref 3.8–5.8)
SODIUM BLD-SCNC: 140 MMOL/L (ref 132–146)
TEMAZEPAM, URINE: 1669 NG/ML
TOTAL CK: 6402 U/L (ref 20–200)
WBC # BLD: 6.8 E9/L (ref 4.5–11.5)

## 2019-08-17 PROCEDURE — 2060000000 HC ICU INTERMEDIATE R&B

## 2019-08-17 PROCEDURE — 36415 COLL VENOUS BLD VENIPUNCTURE: CPT

## 2019-08-17 PROCEDURE — 80048 BASIC METABOLIC PNL TOTAL CA: CPT

## 2019-08-17 PROCEDURE — 6360000002 HC RX W HCPCS: Performed by: INTERNAL MEDICINE

## 2019-08-17 PROCEDURE — 6370000000 HC RX 637 (ALT 250 FOR IP): Performed by: INTERNAL MEDICINE

## 2019-08-17 PROCEDURE — 85025 COMPLETE CBC W/AUTO DIFF WBC: CPT

## 2019-08-17 PROCEDURE — 2580000003 HC RX 258: Performed by: INTERNAL MEDICINE

## 2019-08-17 PROCEDURE — C9113 INJ PANTOPRAZOLE SODIUM, VIA: HCPCS | Performed by: INTERNAL MEDICINE

## 2019-08-17 PROCEDURE — 6370000000 HC RX 637 (ALT 250 FOR IP): Performed by: FAMILY MEDICINE

## 2019-08-17 PROCEDURE — 82550 ASSAY OF CK (CPK): CPT

## 2019-08-17 PROCEDURE — 94640 AIRWAY INHALATION TREATMENT: CPT

## 2019-08-17 RX ORDER — SODIUM CHLORIDE, SODIUM LACTATE, POTASSIUM CHLORIDE, CALCIUM CHLORIDE 600; 310; 30; 20 MG/100ML; MG/100ML; MG/100ML; MG/100ML
INJECTION, SOLUTION INTRAVENOUS CONTINUOUS
Status: DISCONTINUED | OUTPATIENT
Start: 2019-08-17 | End: 2019-08-18

## 2019-08-17 RX ADMIN — OXYCODONE HYDROCHLORIDE 10 MG: 10 TABLET ORAL at 20:15

## 2019-08-17 RX ADMIN — AMOXICILLIN AND CLAVULANATE POTASSIUM 2 TABLET: 562.5; 437.5; 62.5 TABLET, MULTILAYER, EXTENDED RELEASE ORAL at 09:13

## 2019-08-17 RX ADMIN — ACETAMINOPHEN 650 MG: 325 TABLET, FILM COATED ORAL at 23:36

## 2019-08-17 RX ADMIN — ENOXAPARIN SODIUM 40 MG: 40 INJECTION SUBCUTANEOUS at 09:13

## 2019-08-17 RX ADMIN — OXYCODONE HYDROCHLORIDE 10 MG: 10 TABLET ORAL at 15:49

## 2019-08-17 RX ADMIN — IPRATROPIUM BROMIDE AND ALBUTEROL SULFATE 1 AMPULE: .5; 3 SOLUTION RESPIRATORY (INHALATION) at 20:04

## 2019-08-17 RX ADMIN — OXYCODONE HYDROCHLORIDE 10 MG: 10 TABLET ORAL at 01:18

## 2019-08-17 RX ADMIN — PANTOPRAZOLE SODIUM 40 MG: 40 INJECTION, POWDER, FOR SOLUTION INTRAVENOUS at 09:13

## 2019-08-17 RX ADMIN — CALCIUM CARBONATE (ANTACID) CHEW TAB 500 MG 500 MG: 500 CHEW TAB at 20:15

## 2019-08-17 RX ADMIN — DOCUSATE SODIUM 100 MG: 100 CAPSULE, LIQUID FILLED ORAL at 09:13

## 2019-08-17 RX ADMIN — Medication 10 ML: at 09:14

## 2019-08-17 RX ADMIN — Medication 10 ML: at 09:13

## 2019-08-17 RX ADMIN — IPRATROPIUM BROMIDE AND ALBUTEROL SULFATE 1 AMPULE: .5; 3 SOLUTION RESPIRATORY (INHALATION) at 07:48

## 2019-08-17 RX ADMIN — OXYCODONE HYDROCHLORIDE 10 MG: 10 TABLET ORAL at 07:20

## 2019-08-17 RX ADMIN — SODIUM CHLORIDE, POTASSIUM CHLORIDE, SODIUM LACTATE AND CALCIUM CHLORIDE: 600; 310; 30; 20 INJECTION, SOLUTION INTRAVENOUS at 17:30

## 2019-08-17 RX ADMIN — IPRATROPIUM BROMIDE AND ALBUTEROL SULFATE 1 AMPULE: .5; 3 SOLUTION RESPIRATORY (INHALATION) at 16:22

## 2019-08-17 RX ADMIN — IPRATROPIUM BROMIDE AND ALBUTEROL SULFATE 1 AMPULE: .5; 3 SOLUTION RESPIRATORY (INHALATION) at 11:29

## 2019-08-17 RX ADMIN — DOCUSATE SODIUM 100 MG: 100 CAPSULE, LIQUID FILLED ORAL at 20:40

## 2019-08-17 RX ADMIN — OXYCODONE HYDROCHLORIDE 10 MG: 10 TABLET ORAL at 11:39

## 2019-08-17 RX ADMIN — AMOXICILLIN AND CLAVULANATE POTASSIUM 2 TABLET: 562.5; 437.5; 62.5 TABLET, MULTILAYER, EXTENDED RELEASE ORAL at 20:40

## 2019-08-17 ASSESSMENT — PAIN SCALES - GENERAL
PAINLEVEL_OUTOF10: 9
PAINLEVEL_OUTOF10: 0
PAINLEVEL_OUTOF10: 0
PAINLEVEL_OUTOF10: 9
PAINLEVEL_OUTOF10: 0
PAINLEVEL_OUTOF10: 10
PAINLEVEL_OUTOF10: 7
PAINLEVEL_OUTOF10: 9
PAINLEVEL_OUTOF10: 9
PAINLEVEL_OUTOF10: 10

## 2019-08-17 ASSESSMENT — PAIN DESCRIPTION - LOCATION
LOCATION: LEG

## 2019-08-17 ASSESSMENT — PAIN DESCRIPTION - PAIN TYPE
TYPE: ACUTE PAIN;SURGICAL PAIN

## 2019-08-17 ASSESSMENT — PAIN DESCRIPTION - FREQUENCY
FREQUENCY: CONTINUOUS

## 2019-08-17 ASSESSMENT — PAIN DESCRIPTION - ONSET
ONSET: ON-GOING

## 2019-08-17 ASSESSMENT — PAIN DESCRIPTION - ORIENTATION
ORIENTATION: RIGHT

## 2019-08-17 ASSESSMENT — PAIN DESCRIPTION - DESCRIPTORS
DESCRIPTORS: ACHING;DISCOMFORT;SHARP
DESCRIPTORS: ACHING;DISCOMFORT;SHARP
DESCRIPTORS: ACHING;CRAMPING;DISCOMFORT

## 2019-08-17 ASSESSMENT — PAIN DESCRIPTION - PROGRESSION
CLINICAL_PROGRESSION: NOT CHANGED
CLINICAL_PROGRESSION: NOT CHANGED

## 2019-08-17 NOTE — PLAN OF CARE
Problem: Risk for Impaired Skin Integrity  Goal: Tissue integrity - skin and mucous membranes  Description  Structural intactness and normal physiological function of skin and  mucous membranes.   8/17/2019 0024 by Duane Simpers, RN  Outcome: Met This Shift  8/16/2019 2047 by Duane Simpers, RN  Outcome: Met This Shift  8/16/2019 1841 by Loretta Mosqueda RN  Outcome: Met This Shift     Problem: Falls - Risk of:  Goal: Will remain free from falls  Description  Will remain free from falls  8/16/2019 2047 by Duane Simpers, RN  Outcome: Met This Shift  8/16/2019 1841 by Loretta Mosqueda RN  Outcome: Met This Shift  Goal: Absence of physical injury  Description  Absence of physical injury  8/17/2019 0024 by Duane Simpers, RN  Outcome: Met This Shift  8/16/2019 2047 by Duane Simpers, RN  Outcome: Met This Shift  8/16/2019 1841 by Loretta Mosqueda RN  Outcome: Met This Shift     Problem: Pain:  Goal: Pain level will decrease  Description  Pain level will decrease  8/16/2019 1841 by Loretta Mosqueda RN  Outcome: Met This Shift  Goal: Control of acute pain  Description  Control of acute pain  8/17/2019 0024 by Duane Simpers, RN  Outcome: Met This Shift  8/16/2019 2047 by Duane Simpers, RN  Outcome: Met This Shift  8/16/2019 1841 by Loretta Mosqueda RN  Outcome: Met This Shift  Goal: Control of chronic pain  Description  Control of chronic pain  8/16/2019 1841 by Loretta Mosqueda RN  Outcome: Met This Shift     Problem: Skin Integrity:  Goal: Will show no infection signs and symptoms  Description  Will show no infection signs and symptoms  8/16/2019 1841 by Loretta Mosqueda RN  Outcome: Met This Shift  Goal: Absence of new skin breakdown  Description  Absence of new skin breakdown  8/17/2019 0024 by Duane Simpers, RN  Outcome: Met This Shift  8/16/2019 2047 by Duane Simpers, RN  Outcome: Met This Shift  8/16/2019 1841 by Loretta Mosqueda RN  Outcome: Met This Shift     Problem: Suicide risk  Goal: Provide patient with safe environment  Description  Provide patient with safe environment  8/17/2019 0024 by Marta Hall RN  Outcome: Met This Shift  8/16/2019 1841 by Александр Cabral RN  Outcome: Met This Shift

## 2019-08-17 NOTE — PROGRESS NOTES
Department of Internal Medicine  Infectious Diseases   Progress Note      C /C: pain to right leg s/p fasciotomy    Patient seen and examined at bedside with wife present  He is complaining of pain to right leg  Wound vacc present with serosanguinous drainage  Afebrile, no leukocytosis  Tolerating antbiotic without side effects    Current Facility-Administered Medications   Medication Dose Route Frequency Provider Last Rate Last Dose    amoxicillin-clavulanate (AUGMENTIN XR) 1000-62.5 MG per extended release tablet 2 tablet  2 tablet Oral 2 times per day Leena Barrett MD   2 tablet at 08/17/19 0913    calcium carbonate (TUMS) chewable tablet 500 mg  500 mg Oral TID PRN Jannet Palacios MD        lidocaine 1 % injection 5 mL  5 mL Intradermal Once Jose Dave MD        acetaminophen (TYLENOL) tablet 650 mg  650 mg Oral Q4H PRN Jose Dave MD        sodium chloride flush 0.9 % injection 10 mL  10 mL Intravenous 2 times per day Jose Dave MD   10 mL at 08/17/19 0914    sodium chloride flush 0.9 % injection 10 mL  10 mL Intravenous PRN Jose Dave MD        docusate sodium (COLACE) capsule 100 mg  100 mg Oral BID Jose Dave MD   100 mg at 08/17/19 0913    oxyCODONE (ROXICODONE) immediate release tablet 5 mg  5 mg Oral Q4H PRN Jose Dave MD   5 mg at 08/15/19 1659    Or    oxyCODONE (ROXICODONE) immediate release tablet 10 mg  10 mg Oral Q4H PRN Jose Dave MD   10 mg at 08/17/19 1139    ipratropium-albuterol (DUONEB) nebulizer solution 1 ampule  1 ampule Inhalation Q4H WA Jose Dave MD   1 ampule at 08/17/19 1129    enoxaparin (LOVENOX) injection 40 mg  40 mg Subcutaneous Daily Jose Dave MD   40 mg at 08/17/19 0913    pantoprazole (PROTONIX) injection 40 mg  40 mg Intravenous Daily Jose Dave MD   40 mg at 08/17/19 0913    And    sodium chloride (PF) 0.9 % injection 10 mL  10 mL Intravenous Daily Jose aDve MD   10 mL at 08/17/19 0913    fentaNYL (SUBLIMAZE) injection 25 mcg  25 mcg Intravenous Q2H PRN Lauren Braun MD   25 mcg at 08/15/19 1602    ondansetron (ZOFRAN) injection 4 mg  4 mg Intravenous Q6H PRN Lauren Braun MD        magnesium hydroxide (MILK OF MAGNESIA) 400 MG/5ML suspension 30 mL  30 mL Oral Daily PRN Lauren Braun MD           REVIEW OF SYSTEMS:      CONSTITUTIONAL:  Denies fever     HEENT: denies blurring of vision or double vision, denies hearing problem  RESPIRATORY: denies cough or SOB   CARDIOVASCULAR:  Denies palpitation  GASTROINTESTINAL:  Denies abdomen pain, diarrhea or constipation. GENITOURINARY:  Denies burning urination or frequency of urination  INTEGUMENT: denies wound , rash  HEMATOLOGIC/LYMPHATIC:  Denies lymph node swelling, gum bleeding or easy bruising. MUSCULOSKELETAL: leg pain   NEUROLOGICAL:  Awake and alert       PHYSICAL EXAM:      Vitals:     BP (!) 119/59   Pulse 105   Temp 97.9 °F (36.6 °C) (Temporal)   Resp 18   Ht 5' 7\" (1.702 m)   Wt 193 lb 8 oz (87.8 kg)   SpO2 94%   BMI 30.31 kg/m²       General Appearance:    Awake, intubated    Head:    Normocephalic, atraumatic   Eyes:    No pallor, no icterus,   Ears:    No obvious deformity or drainage.    Nose:   No nasal drainage   Throat:   Mucosa moist, no oral thrush   Neck:   Supple, no lymphadenopathy   Back:     no CVA tenderness   Lungs:     Scattered rhonchi    Heart:    Regular rate and rhythm, no murmur, rub or gallop   Abdomen:     Soft, non-tender, bowel sounds present    Extremities:    Right leg wound VAC - serosanguinous drainage   Pulses:   Dorsalis pedis palpable    Skin:   Wound right leg, blisters      CBC with Differential:      Lab Results   Component Value Date    WBC 6.8 08/17/2019    RBC 3.26 08/17/2019    HGB 10.2 08/17/2019    HCT 30.9 08/17/2019     08/17/2019    MCV 94.8 08/17/2019    MCH 31.3 08/17/2019    MCHC 33.0 08/17/2019    RDW 14.2 08/17/2019    SEGSPCT 71 10/20/2012    BANDSPCT 11 09/04/2014 disease. 2. Underlying central pulmonary vascular congestion with thoracic  aortic vascular calcifications. ASSESSMENT    1. Polysubstance abuse   2. Hep C infection ( HIV neg )   3. Rhabdomyolysis/ compartment syndrome ( right leg )  s/p fasciotomy   4. Leukocytosis / Fever - improved   5. Aspiration pneumonia   6. CONS bacteremia - contamination      PLAN    1. Augmentin XR 2 gm q 12 hrs X 4 more days   2. Local wound care with wound vacc     Pt seen and examined. Above discussed agree with advanced practice nurse. Labs, cultures, and radiographs reviewed. Face to Face encounter occurred. Changes made as necessary.      Torres Ayala MD

## 2019-08-17 NOTE — PLAN OF CARE
Problem: Risk for Impaired Skin Integrity  Goal: Tissue integrity - skin and mucous membranes  Description  Structural intactness and normal physiological function of skin and  mucous membranes.   8/16/2019 2047 by Kaleb Gilbert RN  Outcome: Met This Shift  8/16/2019 1841 by Familia Avlia RN  Outcome: Met This Shift     Problem: Falls - Risk of:  Goal: Will remain free from falls  Description  Will remain free from falls  8/16/2019 2047 by Kaleb Gilbert RN  Outcome: Met This Shift  8/16/2019 1841 by Familia Avila RN  Outcome: Met This Shift  Goal: Absence of physical injury  Description  Absence of physical injury  8/16/2019 2047 by Kaleb Gilbert RN  Outcome: Met This Shift  8/16/2019 1841 by Familia Avila RN  Outcome: Met This Shift     Problem: Pain:  Goal: Pain level will decrease  Description  Pain level will decrease  8/16/2019 1841 by Familia Avila RN  Outcome: Met This Shift  Goal: Control of acute pain  Description  Control of acute pain  8/16/2019 2047 by Kaleb Gilbert RN  Outcome: Met This Shift  8/16/2019 1841 by Familia Avila RN  Outcome: Met This Shift  Goal: Control of chronic pain  Description  Control of chronic pain  8/16/2019 1841 by Familia Avila RN  Outcome: Met This Shift     Problem: Skin Integrity:  Goal: Will show no infection signs and symptoms  Description  Will show no infection signs and symptoms  8/16/2019 1841 by Familia Avila RN  Outcome: Met This Shift  Goal: Absence of new skin breakdown  Description  Absence of new skin breakdown  8/16/2019 2047 by Kaleb Gilbert RN  Outcome: Met This Shift  8/16/2019 1841 by Familia Avila RN  Outcome: Met This Shift     Problem: Suicide risk  Goal: Provide patient with safe environment  Description  Provide patient with safe environment  8/16/2019 1841 by Familia Avila RN  Outcome: Met This Shift

## 2019-08-17 NOTE — PROGRESS NOTES
Department of Orthopedic Surgery   Progress Note    Patient seen and examined POD#3, from most recent wash out. Pain controlled. No new complaints or paresthesias. Denies chest pain, shortness of breath, calf pain, dizziness/lightheadedness. Denies fevers or chills. Denies N/V/D. Feels sensation much better than yesterday per patient    VITALS:  /65   Pulse 100   Temp 97.7 °F (36.5 °C) (Temporal)   Resp 16   Ht 5' 7\" (1.702 m)   Wt 193 lb 8 oz (87.8 kg)   SpO2 93%   BMI 30.31 kg/m²     GENERAL: AAOx3  MUSCULOSKELETAL:   right lower extremity:  · Wound vac C/D/I, holding suction; dressing CDI  · Compartments firm but compressible.  Thigh is soft and compressible  · Palpable dorsalis pedis and posterior tibialis pulse, brisk cap refill to toes, foot warm and perfused  · Motor not intact at this time to leg/foot again noted  · Sensation intact to toes    CBC:   Lab Results   Component Value Date    WBC 8.1 08/16/2019    HGB 8.9 08/16/2019    HCT 27.1 08/16/2019     08/16/2019       ASSESSMENT  · S/P R lower leg fasciotomy 8/11, repeat I&D 8/14    PLAN    · NWB RLE  · Pain control pr primary team  · DVT prophylaxis- per primary team  · Monitor Labs  · Trend H&H  · Will need repeat I&D and secondary closure Monday, 8/19/19  · PT/OT  · D/C planning, SW/PT recs  · Continue to follow   · Electronically signed by Narciso Granda DO on 8/17/2019 at 7:28 AM

## 2019-08-17 NOTE — PROGRESS NOTES
Department of Internal Medicine  Nephrology Consult Note      SUBJECTIVE: We are following Nury Chain for LYNN and acid-base disturbances. Patient has no complaints.           PHYSICAL EXAM:      Vitals:    VITALS:  /63   Pulse 98   Temp 98.2 °F (36.8 °C) (Temporal)   Resp 18   Ht 5' 7\" (1.702 m)   Wt 193 lb 8 oz (87.8 kg)   SpO2 95%   BMI 30.31 kg/m²   24HR INTAKE/OUTPUT:      Intake/Output Summary (Last 24 hours) at 8/17/2019 1637  Last data filed at 8/17/2019 1333  Gross per 24 hour   Intake 2178.33 ml   Output 4850 ml   Net -2671.67 ml       Constitutional:  Patient is awake and alert  HEENT:  normal  Respiratory:  Clear to auscultation  Cardiovascular/Edema:  Regular rhythm and rate, no edema  Gastrointestinal:  Soft non-tender, present bowel sounds  Musculoskeletal: s/p fasciotomy of right leg, wound vac, mild erythema and decreased swelling  Neurologic:  No focal signs, alert and oriented    Scheduled Meds:   amoxicillin-clavulanate  2 tablet Oral 2 times per day    lidocaine  5 mL Intradermal Once    sodium chloride flush  10 mL Intravenous 2 times per day    docusate sodium  100 mg Oral BID    ipratropium-albuterol  1 ampule Inhalation Q4H WA    enoxaparin  40 mg Subcutaneous Daily    pantoprazole  40 mg Intravenous Daily    And    sodium chloride (PF)  10 mL Intravenous Daily     Continuous Infusions:    PRN Meds:.calcium carbonate, acetaminophen, sodium chloride flush, oxyCODONE **OR** oxyCODONE, fentanNYL, ondansetron, magnesium hydroxide    DATA:    CBC:   Lab Results   Component Value Date    WBC 6.8 08/17/2019    RBC 3.26 08/17/2019    HGB 10.2 08/17/2019    HCT 30.9 08/17/2019    MCV 94.8 08/17/2019    MCH 31.3 08/17/2019    MCHC 33.0 08/17/2019    RDW 14.2 08/17/2019     08/17/2019    MPV 10.0 08/17/2019     CMP:    Lab Results   Component Value Date     08/17/2019    K 3.8 08/17/2019     08/17/2019    CO2 28 08/17/2019    BUN 18 08/17/2019    CREATININE

## 2019-08-18 ENCOUNTER — ANESTHESIA EVENT (OUTPATIENT)
Dept: OPERATING ROOM | Age: 54
DRG: 317 | End: 2019-08-18
Payer: COMMERCIAL

## 2019-08-18 LAB
ANION GAP SERPL CALCULATED.3IONS-SCNC: 11 MMOL/L (ref 7–16)
BASOPHILS ABSOLUTE: 0.03 E9/L (ref 0–0.2)
BASOPHILS RELATIVE PERCENT: 0.3 % (ref 0–2)
BLOOD CULTURE, ROUTINE: NORMAL
BUN BLDV-MCNC: 25 MG/DL (ref 6–20)
CALCIUM SERPL-MCNC: 8.5 MG/DL (ref 8.6–10.2)
CHLORIDE BLD-SCNC: 98 MMOL/L (ref 98–107)
CO2: 30 MMOL/L (ref 22–29)
CREAT SERPL-MCNC: 0.8 MG/DL (ref 0.7–1.2)
CULTURE, BLOOD 2: NORMAL
EOSINOPHILS ABSOLUTE: 0.12 E9/L (ref 0.05–0.5)
EOSINOPHILS RELATIVE PERCENT: 1.4 % (ref 0–6)
GFR AFRICAN AMERICAN: >60
GFR NON-AFRICAN AMERICAN: >60 ML/MIN/1.73
GLUCOSE BLD-MCNC: 93 MG/DL (ref 74–99)
HCT VFR BLD CALC: 34.6 % (ref 37–54)
HEMOGLOBIN: 11.5 G/DL (ref 12.5–16.5)
IMMATURE GRANULOCYTES #: 0.18 E9/L
IMMATURE GRANULOCYTES %: 2.1 % (ref 0–5)
LYMPHOCYTES ABSOLUTE: 2.16 E9/L (ref 1.5–4)
LYMPHOCYTES RELATIVE PERCENT: 25.1 % (ref 20–42)
MCH RBC QN AUTO: 31.3 PG (ref 26–35)
MCHC RBC AUTO-ENTMCNC: 33.2 % (ref 32–34.5)
MCV RBC AUTO: 94 FL (ref 80–99.9)
MONOCYTES ABSOLUTE: 0.76 E9/L (ref 0.1–0.95)
MONOCYTES RELATIVE PERCENT: 8.8 % (ref 2–12)
NEUTROPHILS ABSOLUTE: 5.35 E9/L (ref 1.8–7.3)
NEUTROPHILS RELATIVE PERCENT: 62.3 % (ref 43–80)
PDW BLD-RTO: 14.1 FL (ref 11.5–15)
PLATELET # BLD: 335 E9/L (ref 130–450)
PMV BLD AUTO: 9.7 FL (ref 7–12)
POTASSIUM REFLEX MAGNESIUM: 4 MMOL/L (ref 3.5–5)
RBC # BLD: 3.68 E12/L (ref 3.8–5.8)
SODIUM BLD-SCNC: 139 MMOL/L (ref 132–146)
TOTAL CK: 4290 U/L (ref 20–200)
WBC # BLD: 8.6 E9/L (ref 4.5–11.5)

## 2019-08-18 PROCEDURE — 6370000000 HC RX 637 (ALT 250 FOR IP): Performed by: INTERNAL MEDICINE

## 2019-08-18 PROCEDURE — C9113 INJ PANTOPRAZOLE SODIUM, VIA: HCPCS | Performed by: INTERNAL MEDICINE

## 2019-08-18 PROCEDURE — 36415 COLL VENOUS BLD VENIPUNCTURE: CPT

## 2019-08-18 PROCEDURE — 2060000000 HC ICU INTERMEDIATE R&B

## 2019-08-18 PROCEDURE — 94640 AIRWAY INHALATION TREATMENT: CPT

## 2019-08-18 PROCEDURE — 85025 COMPLETE CBC W/AUTO DIFF WBC: CPT

## 2019-08-18 PROCEDURE — 2580000003 HC RX 258: Performed by: INTERNAL MEDICINE

## 2019-08-18 PROCEDURE — 6360000002 HC RX W HCPCS: Performed by: INTERNAL MEDICINE

## 2019-08-18 PROCEDURE — 82550 ASSAY OF CK (CPK): CPT

## 2019-08-18 PROCEDURE — 80048 BASIC METABOLIC PNL TOTAL CA: CPT

## 2019-08-18 RX ORDER — SODIUM CHLORIDE 9 MG/ML
INJECTION, SOLUTION INTRAVENOUS CONTINUOUS
Status: DISCONTINUED | OUTPATIENT
Start: 2019-08-18 | End: 2019-08-19

## 2019-08-18 RX ADMIN — OXYCODONE HYDROCHLORIDE 10 MG: 10 TABLET ORAL at 13:05

## 2019-08-18 RX ADMIN — OXYCODONE HYDROCHLORIDE 10 MG: 10 TABLET ORAL at 00:36

## 2019-08-18 RX ADMIN — SODIUM CHLORIDE, POTASSIUM CHLORIDE, SODIUM LACTATE AND CALCIUM CHLORIDE: 600; 310; 30; 20 INJECTION, SOLUTION INTRAVENOUS at 08:54

## 2019-08-18 RX ADMIN — PANTOPRAZOLE SODIUM 40 MG: 40 INJECTION, POWDER, FOR SOLUTION INTRAVENOUS at 08:52

## 2019-08-18 RX ADMIN — DOCUSATE SODIUM 100 MG: 100 CAPSULE, LIQUID FILLED ORAL at 20:22

## 2019-08-18 RX ADMIN — DOCUSATE SODIUM 100 MG: 100 CAPSULE, LIQUID FILLED ORAL at 08:51

## 2019-08-18 RX ADMIN — OXYCODONE HYDROCHLORIDE 10 MG: 10 TABLET ORAL at 21:26

## 2019-08-18 RX ADMIN — AMOXICILLIN AND CLAVULANATE POTASSIUM 2 TABLET: 562.5; 437.5; 62.5 TABLET, MULTILAYER, EXTENDED RELEASE ORAL at 08:51

## 2019-08-18 RX ADMIN — ENOXAPARIN SODIUM 40 MG: 40 INJECTION SUBCUTANEOUS at 08:51

## 2019-08-18 RX ADMIN — AMOXICILLIN AND CLAVULANATE POTASSIUM 2 TABLET: 562.5; 437.5; 62.5 TABLET, MULTILAYER, EXTENDED RELEASE ORAL at 20:22

## 2019-08-18 RX ADMIN — Medication 10 ML: at 08:53

## 2019-08-18 RX ADMIN — Medication 10 ML: at 08:52

## 2019-08-18 RX ADMIN — OXYCODONE HYDROCHLORIDE 10 MG: 10 TABLET ORAL at 17:10

## 2019-08-18 RX ADMIN — IPRATROPIUM BROMIDE AND ALBUTEROL SULFATE 1 AMPULE: .5; 3 SOLUTION RESPIRATORY (INHALATION) at 12:17

## 2019-08-18 RX ADMIN — IPRATROPIUM BROMIDE AND ALBUTEROL SULFATE 1 AMPULE: .5; 3 SOLUTION RESPIRATORY (INHALATION) at 15:44

## 2019-08-18 RX ADMIN — SODIUM CHLORIDE: 9 INJECTION, SOLUTION INTRAVENOUS at 16:24

## 2019-08-18 RX ADMIN — OXYCODONE HYDROCHLORIDE 10 MG: 10 TABLET ORAL at 08:52

## 2019-08-18 ASSESSMENT — PAIN SCALES - GENERAL
PAINLEVEL_OUTOF10: 10
PAINLEVEL_OUTOF10: 8
PAINLEVEL_OUTOF10: 6
PAINLEVEL_OUTOF10: 7
PAINLEVEL_OUTOF10: 8
PAINLEVEL_OUTOF10: 8
PAINLEVEL_OUTOF10: 10
PAINLEVEL_OUTOF10: 8
PAINLEVEL_OUTOF10: 0
PAINLEVEL_OUTOF10: 7

## 2019-08-18 ASSESSMENT — PAIN DESCRIPTION - FREQUENCY
FREQUENCY: CONTINUOUS

## 2019-08-18 ASSESSMENT — PAIN DESCRIPTION - DESCRIPTORS
DESCRIPTORS: DISCOMFORT;BURNING
DESCRIPTORS: DISCOMFORT;ACHING
DESCRIPTORS: DISCOMFORT;SHARP
DESCRIPTORS: ACHING;DISCOMFORT;SHARP
DESCRIPTORS: ACHING;DISCOMFORT;THROBBING
DESCRIPTORS: SHARP;THROBBING;ACHING

## 2019-08-18 ASSESSMENT — PAIN DESCRIPTION - ORIENTATION
ORIENTATION: RIGHT

## 2019-08-18 ASSESSMENT — PAIN DESCRIPTION - PAIN TYPE
TYPE: ACUTE PAIN;SURGICAL PAIN

## 2019-08-18 ASSESSMENT — PAIN DESCRIPTION - PROGRESSION
CLINICAL_PROGRESSION: GRADUALLY IMPROVING
CLINICAL_PROGRESSION: NOT CHANGED

## 2019-08-18 ASSESSMENT — PAIN DESCRIPTION - LOCATION
LOCATION: KNEE;LEG
LOCATION: KNEE;LEG
LOCATION: LEG
LOCATION: KNEE;LEG
LOCATION: KNEE;LEG
LOCATION: LEG

## 2019-08-18 ASSESSMENT — PAIN DESCRIPTION - ONSET
ONSET: ON-GOING

## 2019-08-18 NOTE — PROGRESS NOTES
08/18/2019    BUN 25 08/18/2019    CREATININE 0.8 08/18/2019    GFRAA >60 08/18/2019    LABGLOM >60 08/18/2019    GLUCOSE 93 08/18/2019    GLUCOSE 95 07/08/2011    PROT 5.0 08/15/2019    LABALBU 2.1 08/15/2019    LABALBU 3.6 07/08/2011    CALCIUM 8.5 08/18/2019    BILITOT 0.6 08/15/2019    ALKPHOS 41 08/15/2019     08/15/2019     08/15/2019     Magnesium:    Lab Results   Component Value Date    MG 2.2 08/12/2019     Phosphorus:    Lab Results   Component Value Date    PHOS 2.5 08/14/2019       UA Protein: 30  Urine Chloride: <20  Urine Creatinine: 170  Urine Osmolality: 513  Urine Potassium: 59.0  Urine Sodium: <20  FeNA: 0.1%    Total CK 8/12/19 1800: 103,200 U/L  Total CK 8/12/19 2140:  83,880 U/L  Total CK 8/13/19 0525: 66,180 U/L  Total CK 8/13/19 1251: 52,590 U/L  Total CK 8/13/19 1658: 43,890 U/L  Total CK 8/13/19 2337: 43,590 U/L  Total CK 8/13/19 0430: 40,290 U/L    Radiology Review:    CXR 08/14/2019   No acute process       BRIEF SUMMARY OF INITIAL CONSULT:    Briefly Latesha Epps is 48 y.o. male with history of polysubstance abuse, HCV, bipolar 1 disorder, and schizophrenia, who was admitted on 8/11/2019 by EMS who found him on the floor of his home. The patient was unresponsive, but administration of Narcan slightly improved his mentation. Upon arrival to the ER, he was found to have AMS and right lower leg compartment syndrome. He was brought into surgery to relieve the pressure. Urine drug screen showed positivity for benzos, cocaine, methadone, and amphetamine. He had an elevated creatinine level of 2 mg/dL as well as metabolic acidosis reasons for this consultation. Prior to admission the patient was taking Diazepam, Alprazolam, Lithium, and Fluoxetine. Problems Resolved:    · Lactic acidosis, resolved  · LYNN Stage II, volume responsive pre-renal LYNN (FENa: 0.1%). Urine output >6.9 L in the past 24 hours; resolved.   · Respiratory Failure, extubated, on 2 L O2 NC  · Acute

## 2019-08-19 ENCOUNTER — ANESTHESIA (OUTPATIENT)
Dept: OPERATING ROOM | Age: 54
DRG: 317 | End: 2019-08-19
Payer: COMMERCIAL

## 2019-08-19 VITALS — DIASTOLIC BLOOD PRESSURE: 58 MMHG | OXYGEN SATURATION: 100 % | SYSTOLIC BLOOD PRESSURE: 66 MMHG | TEMPERATURE: 97.9 F

## 2019-08-19 LAB
AMPHETAMINES, URINE: 879 NG/ML
ANION GAP SERPL CALCULATED.3IONS-SCNC: 11 MMOL/L (ref 7–16)
BASOPHILS ABSOLUTE: 0.06 E9/L (ref 0–0.2)
BASOPHILS RELATIVE PERCENT: 0.5 % (ref 0–2)
BUN BLDV-MCNC: 21 MG/DL (ref 6–20)
CALCIUM SERPL-MCNC: 7.9 MG/DL (ref 8.6–10.2)
CHLORIDE BLD-SCNC: 101 MMOL/L (ref 98–107)
CO2: 26 MMOL/L (ref 22–29)
CREAT SERPL-MCNC: 0.9 MG/DL (ref 0.7–1.2)
EOSINOPHILS ABSOLUTE: 0.2 E9/L (ref 0.05–0.5)
EOSINOPHILS RELATIVE PERCENT: 1.7 % (ref 0–6)
GFR AFRICAN AMERICAN: >60
GFR NON-AFRICAN AMERICAN: >60 ML/MIN/1.73
GLUCOSE BLD-MCNC: 108 MG/DL (ref 74–99)
HCT VFR BLD CALC: 35.3 % (ref 37–54)
HEMOGLOBIN: 12 G/DL (ref 12.5–16.5)
IMMATURE GRANULOCYTES #: 0.28 E9/L
IMMATURE GRANULOCYTES %: 2.3 % (ref 0–5)
LYMPHOCYTES ABSOLUTE: 1.98 E9/L (ref 1.5–4)
LYMPHOCYTES RELATIVE PERCENT: 16.4 % (ref 20–42)
MCH RBC QN AUTO: 31.3 PG (ref 26–35)
MCHC RBC AUTO-ENTMCNC: 34 % (ref 32–34.5)
MCV RBC AUTO: 92.2 FL (ref 80–99.9)
METHAMPHETAMINE, URINE: 3347 NG/ML
METHYLENEDIOXYAMPHETAMINE, UR: <200 NG/ML
METHYLENEDIOXYETHYLAMPHETAMINE, UR: <200 NG/ML
METHYLENEDIOXYMETHAMPHETAMINE, UR: <200 NG/ML
MONOCYTES ABSOLUTE: 1.03 E9/L (ref 0.1–0.95)
MONOCYTES RELATIVE PERCENT: 8.6 % (ref 2–12)
NEUTROPHILS ABSOLUTE: 8.49 E9/L (ref 1.8–7.3)
NEUTROPHILS RELATIVE PERCENT: 70.5 % (ref 43–80)
PDW BLD-RTO: 13.8 FL (ref 11.5–15)
PLATELET # BLD: 379 E9/L (ref 130–450)
PMV BLD AUTO: 9.5 FL (ref 7–12)
POTASSIUM REFLEX MAGNESIUM: 3.8 MMOL/L (ref 3.5–5)
RBC # BLD: 3.83 E12/L (ref 3.8–5.8)
SODIUM BLD-SCNC: 138 MMOL/L (ref 132–146)
TOTAL CK: 2650 U/L (ref 20–200)
WBC # BLD: 12 E9/L (ref 4.5–11.5)

## 2019-08-19 PROCEDURE — 11046 DBRDMT MUSC&/FSCA EA ADDL: CPT | Performed by: ORTHOPAEDIC SURGERY

## 2019-08-19 PROCEDURE — 2500000003 HC RX 250 WO HCPCS: Performed by: NURSE ANESTHETIST, CERTIFIED REGISTERED

## 2019-08-19 PROCEDURE — 3600000012 HC SURGERY LEVEL 2 ADDTL 15MIN: Performed by: ORTHOPAEDIC SURGERY

## 2019-08-19 PROCEDURE — 7100000000 HC PACU RECOVERY - FIRST 15 MIN: Performed by: ORTHOPAEDIC SURGERY

## 2019-08-19 PROCEDURE — 3700000001 HC ADD 15 MINUTES (ANESTHESIA): Performed by: ORTHOPAEDIC SURGERY

## 2019-08-19 PROCEDURE — 11043 DBRDMT MUSC&/FSCA 1ST 20/<: CPT | Performed by: ORTHOPAEDIC SURGERY

## 2019-08-19 PROCEDURE — 36415 COLL VENOUS BLD VENIPUNCTURE: CPT

## 2019-08-19 PROCEDURE — 2060000000 HC ICU INTERMEDIATE R&B

## 2019-08-19 PROCEDURE — 2580000003 HC RX 258: Performed by: ANESTHESIOLOGY

## 2019-08-19 PROCEDURE — 6360000002 HC RX W HCPCS: Performed by: ANESTHESIOLOGY

## 2019-08-19 PROCEDURE — 6370000000 HC RX 637 (ALT 250 FOR IP): Performed by: STUDENT IN AN ORGANIZED HEALTH CARE EDUCATION/TRAINING PROGRAM

## 2019-08-19 PROCEDURE — 2700000000 HC OXYGEN THERAPY PER DAY

## 2019-08-19 PROCEDURE — 6360000002 HC RX W HCPCS: Performed by: NURSE ANESTHETIST, CERTIFIED REGISTERED

## 2019-08-19 PROCEDURE — 3700000000 HC ANESTHESIA ATTENDED CARE: Performed by: ORTHOPAEDIC SURGERY

## 2019-08-19 PROCEDURE — C9113 INJ PANTOPRAZOLE SODIUM, VIA: HCPCS | Performed by: INTERNAL MEDICINE

## 2019-08-19 PROCEDURE — 3600000002 HC SURGERY LEVEL 2 BASE: Performed by: ORTHOPAEDIC SURGERY

## 2019-08-19 PROCEDURE — 0KBS0ZZ EXCISION OF RIGHT LOWER LEG MUSCLE, OPEN APPROACH: ICD-10-PCS | Performed by: ORTHOPAEDIC SURGERY

## 2019-08-19 PROCEDURE — 2580000003 HC RX 258: Performed by: NURSE ANESTHETIST, CERTIFIED REGISTERED

## 2019-08-19 PROCEDURE — 2709999900 HC NON-CHARGEABLE SUPPLY: Performed by: ORTHOPAEDIC SURGERY

## 2019-08-19 PROCEDURE — 2580000003 HC RX 258: Performed by: INTERNAL MEDICINE

## 2019-08-19 PROCEDURE — 6370000000 HC RX 637 (ALT 250 FOR IP): Performed by: INTERNAL MEDICINE

## 2019-08-19 PROCEDURE — 97605 NEG PRS WND THER DME<=50SQCM: CPT | Performed by: ORTHOPAEDIC SURGERY

## 2019-08-19 PROCEDURE — 80048 BASIC METABOLIC PNL TOTAL CA: CPT

## 2019-08-19 PROCEDURE — 82550 ASSAY OF CK (CPK): CPT

## 2019-08-19 PROCEDURE — 85025 COMPLETE CBC W/AUTO DIFF WBC: CPT

## 2019-08-19 PROCEDURE — 7100000001 HC PACU RECOVERY - ADDTL 15 MIN: Performed by: ORTHOPAEDIC SURGERY

## 2019-08-19 PROCEDURE — 6360000002 HC RX W HCPCS: Performed by: INTERNAL MEDICINE

## 2019-08-19 RX ORDER — FENTANYL CITRATE 50 UG/ML
50 INJECTION, SOLUTION INTRAMUSCULAR; INTRAVENOUS EVERY 5 MIN PRN
Status: DISCONTINUED | OUTPATIENT
Start: 2019-08-19 | End: 2019-08-19 | Stop reason: HOSPADM

## 2019-08-19 RX ORDER — GLYCOPYRROLATE 1 MG/5 ML
SYRINGE (ML) INTRAVENOUS PRN
Status: DISCONTINUED | OUTPATIENT
Start: 2019-08-19 | End: 2019-08-19 | Stop reason: SDUPTHER

## 2019-08-19 RX ORDER — PROPOFOL 10 MG/ML
INJECTION, EMULSION INTRAVENOUS PRN
Status: DISCONTINUED | OUTPATIENT
Start: 2019-08-19 | End: 2019-08-19 | Stop reason: SDUPTHER

## 2019-08-19 RX ORDER — FENTANYL CITRATE 50 UG/ML
INJECTION, SOLUTION INTRAMUSCULAR; INTRAVENOUS PRN
Status: DISCONTINUED | OUTPATIENT
Start: 2019-08-19 | End: 2019-08-19 | Stop reason: SDUPTHER

## 2019-08-19 RX ORDER — SODIUM CHLORIDE 9 MG/ML
INJECTION, SOLUTION INTRAVENOUS CONTINUOUS PRN
Status: DISCONTINUED | OUTPATIENT
Start: 2019-08-19 | End: 2019-08-19 | Stop reason: SDUPTHER

## 2019-08-19 RX ORDER — FENTANYL CITRATE 50 UG/ML
25 INJECTION, SOLUTION INTRAMUSCULAR; INTRAVENOUS EVERY 5 MIN PRN
Status: DISCONTINUED | OUTPATIENT
Start: 2019-08-19 | End: 2019-08-19 | Stop reason: HOSPADM

## 2019-08-19 RX ORDER — 0.9 % SODIUM CHLORIDE 0.9 %
500 INTRAVENOUS SOLUTION INTRAVENOUS ONCE
Status: COMPLETED | OUTPATIENT
Start: 2019-08-19 | End: 2019-08-19

## 2019-08-19 RX ORDER — ROCURONIUM BROMIDE 10 MG/ML
INJECTION, SOLUTION INTRAVENOUS PRN
Status: DISCONTINUED | OUTPATIENT
Start: 2019-08-19 | End: 2019-08-19 | Stop reason: SDUPTHER

## 2019-08-19 RX ORDER — DEXAMETHASONE SODIUM PHOSPHATE 10 MG/ML
INJECTION INTRAMUSCULAR; INTRAVENOUS PRN
Status: DISCONTINUED | OUTPATIENT
Start: 2019-08-19 | End: 2019-08-19 | Stop reason: SDUPTHER

## 2019-08-19 RX ORDER — CEFAZOLIN SODIUM 1 G/3ML
INJECTION, POWDER, FOR SOLUTION INTRAMUSCULAR; INTRAVENOUS PRN
Status: DISCONTINUED | OUTPATIENT
Start: 2019-08-19 | End: 2019-08-19 | Stop reason: SDUPTHER

## 2019-08-19 RX ORDER — DIPHENHYDRAMINE HYDROCHLORIDE 50 MG/ML
12.5 INJECTION INTRAMUSCULAR; INTRAVENOUS
Status: DISCONTINUED | OUTPATIENT
Start: 2019-08-19 | End: 2019-08-19 | Stop reason: HOSPADM

## 2019-08-19 RX ORDER — ONDANSETRON 2 MG/ML
INJECTION INTRAMUSCULAR; INTRAVENOUS PRN
Status: DISCONTINUED | OUTPATIENT
Start: 2019-08-19 | End: 2019-08-19 | Stop reason: SDUPTHER

## 2019-08-19 RX ORDER — LIDOCAINE HYDROCHLORIDE 10 MG/ML
INJECTION, SOLUTION EPIDURAL; INFILTRATION; INTRACAUDAL; PERINEURAL PRN
Status: DISCONTINUED | OUTPATIENT
Start: 2019-08-19 | End: 2019-08-19 | Stop reason: SDUPTHER

## 2019-08-19 RX ORDER — NEOSTIGMINE METHYLSULFATE 1 MG/ML
INJECTION, SOLUTION INTRAVENOUS PRN
Status: DISCONTINUED | OUTPATIENT
Start: 2019-08-19 | End: 2019-08-19 | Stop reason: SDUPTHER

## 2019-08-19 RX ORDER — MEPERIDINE HYDROCHLORIDE 50 MG/ML
12.5 INJECTION INTRAMUSCULAR; INTRAVENOUS; SUBCUTANEOUS EVERY 5 MIN PRN
Status: DISCONTINUED | OUTPATIENT
Start: 2019-08-19 | End: 2019-08-19 | Stop reason: HOSPADM

## 2019-08-19 RX ORDER — OXYCODONE HYDROCHLORIDE AND ACETAMINOPHEN 5; 325 MG/1; MG/1
1 TABLET ORAL
Status: DISCONTINUED | OUTPATIENT
Start: 2019-08-19 | End: 2019-08-19 | Stop reason: HOSPADM

## 2019-08-19 RX ORDER — SUCCINYLCHOLINE CHLORIDE 20 MG/ML
INJECTION INTRAMUSCULAR; INTRAVENOUS PRN
Status: DISCONTINUED | OUTPATIENT
Start: 2019-08-19 | End: 2019-08-19 | Stop reason: SDUPTHER

## 2019-08-19 RX ORDER — PROMETHAZINE HYDROCHLORIDE 25 MG/ML
6.25 INJECTION, SOLUTION INTRAMUSCULAR; INTRAVENOUS
Status: DISCONTINUED | OUTPATIENT
Start: 2019-08-19 | End: 2019-08-19 | Stop reason: HOSPADM

## 2019-08-19 RX ADMIN — DEXAMETHASONE SODIUM PHOSPHATE 10 MG: 10 INJECTION INTRAMUSCULAR; INTRAVENOUS at 13:21

## 2019-08-19 RX ADMIN — ONDANSETRON HYDROCHLORIDE 4 MG: 2 INJECTION, SOLUTION INTRAMUSCULAR; INTRAVENOUS at 13:21

## 2019-08-19 RX ADMIN — Medication 3 MG: at 14:07

## 2019-08-19 RX ADMIN — DOCUSATE SODIUM 100 MG: 100 CAPSULE, LIQUID FILLED ORAL at 20:36

## 2019-08-19 RX ADMIN — Medication 10 ML: at 08:30

## 2019-08-19 RX ADMIN — SUCCINYLCHOLINE CHLORIDE 120 MG: 20 INJECTION, SOLUTION INTRAMUSCULAR; INTRAVENOUS at 13:11

## 2019-08-19 RX ADMIN — SODIUM CHLORIDE 500 ML: 9 INJECTION, SOLUTION INTRAVENOUS at 16:20

## 2019-08-19 RX ADMIN — FENTANYL CITRATE 50 MCG: 50 INJECTION, SOLUTION INTRAMUSCULAR; INTRAVENOUS at 13:02

## 2019-08-19 RX ADMIN — PROPOFOL 160 MG: 10 INJECTION, EMULSION INTRAVENOUS at 13:02

## 2019-08-19 RX ADMIN — AMOXICILLIN AND CLAVULANATE POTASSIUM 2 TABLET: 562.5; 437.5; 62.5 TABLET, MULTILAYER, EXTENDED RELEASE ORAL at 20:36

## 2019-08-19 RX ADMIN — FENTANYL CITRATE 50 MCG: 50 INJECTION, SOLUTION INTRAMUSCULAR; INTRAVENOUS at 14:23

## 2019-08-19 RX ADMIN — FENTANYL CITRATE 50 MCG: 50 INJECTION, SOLUTION INTRAMUSCULAR; INTRAVENOUS at 13:16

## 2019-08-19 RX ADMIN — SODIUM CHLORIDE: 9 INJECTION, SOLUTION INTRAVENOUS at 12:58

## 2019-08-19 RX ADMIN — LIDOCAINE HYDROCHLORIDE 20 MG: 10 INJECTION, SOLUTION EPIDURAL; INFILTRATION; INTRACAUDAL; PERINEURAL at 13:02

## 2019-08-19 RX ADMIN — FENTANYL CITRATE 50 MCG: 50 INJECTION, SOLUTION INTRAMUSCULAR; INTRAVENOUS at 14:06

## 2019-08-19 RX ADMIN — OXYCODONE HYDROCHLORIDE 5 MG: 5 TABLET ORAL at 22:32

## 2019-08-19 RX ADMIN — CEFAZOLIN 2000 MG: 1 INJECTION, POWDER, FOR SOLUTION INTRAMUSCULAR; INTRAVENOUS at 13:18

## 2019-08-19 RX ADMIN — FENTANYL CITRATE 50 MCG: 50 INJECTION, SOLUTION INTRAMUSCULAR; INTRAVENOUS at 14:18

## 2019-08-19 RX ADMIN — PANTOPRAZOLE SODIUM 40 MG: 40 INJECTION, POWDER, FOR SOLUTION INTRAVENOUS at 08:30

## 2019-08-19 RX ADMIN — SODIUM CHLORIDE: 9 INJECTION, SOLUTION INTRAVENOUS at 14:36

## 2019-08-19 RX ADMIN — DOCUSATE SODIUM 100 MG: 100 CAPSULE, LIQUID FILLED ORAL at 08:30

## 2019-08-19 RX ADMIN — Medication 0.6 MG: at 14:07

## 2019-08-19 RX ADMIN — SODIUM CHLORIDE 500 ML: 9 INJECTION, SOLUTION INTRAVENOUS at 15:40

## 2019-08-19 RX ADMIN — PROMETHAZINE HYDROCHLORIDE 6.25 MG: 25 INJECTION INTRAMUSCULAR; INTRAVENOUS at 14:49

## 2019-08-19 RX ADMIN — OXYCODONE HYDROCHLORIDE 10 MG: 10 TABLET ORAL at 06:26

## 2019-08-19 RX ADMIN — ROCURONIUM BROMIDE 20 MG: 10 INJECTION, SOLUTION INTRAVENOUS at 13:11

## 2019-08-19 RX ADMIN — AMOXICILLIN AND CLAVULANATE POTASSIUM 2 TABLET: 562.5; 437.5; 62.5 TABLET, MULTILAYER, EXTENDED RELEASE ORAL at 08:30

## 2019-08-19 ASSESSMENT — PAIN DESCRIPTION - PROGRESSION
CLINICAL_PROGRESSION: NOT CHANGED
CLINICAL_PROGRESSION: OTHER (COMMENT)
CLINICAL_PROGRESSION: OTHER (COMMENT)

## 2019-08-19 ASSESSMENT — PAIN DESCRIPTION - PAIN TYPE
TYPE: ACUTE PAIN;SURGICAL PAIN

## 2019-08-19 ASSESSMENT — PAIN DESCRIPTION - ORIENTATION
ORIENTATION: RIGHT

## 2019-08-19 ASSESSMENT — PAIN DESCRIPTION - FREQUENCY
FREQUENCY: CONTINUOUS

## 2019-08-19 ASSESSMENT — PAIN SCALES - GENERAL
PAINLEVEL_OUTOF10: 8
PAINLEVEL_OUTOF10: 10
PAINLEVEL_OUTOF10: 6
PAINLEVEL_OUTOF10: 10
PAINLEVEL_OUTOF10: 0

## 2019-08-19 ASSESSMENT — PAIN DESCRIPTION - LOCATION
LOCATION: KNEE;LEG
LOCATION: LEG
LOCATION: KNEE;LEG

## 2019-08-19 ASSESSMENT — LIFESTYLE VARIABLES: SMOKING_STATUS: 1

## 2019-08-19 ASSESSMENT — PAIN DESCRIPTION - DIRECTION: RADIATING_TOWARDS: BUTTOCKS

## 2019-08-19 ASSESSMENT — PAIN DESCRIPTION - DESCRIPTORS
DESCRIPTORS: ACHING;DISCOMFORT
DESCRIPTORS: SHARP;SHOOTING
DESCRIPTORS: ACHING;DISCOMFORT;CONSTANT

## 2019-08-19 ASSESSMENT — PAIN DESCRIPTION - ONSET: ONSET: ON-GOING

## 2019-08-19 NOTE — ANESTHESIA PRE PROCEDURE
Aspiration pneumonia of both lungs due to gastric secretions ([facility] 6/10/2017    Bipolar 1 disorder ([facility]     Hepatitis C     [facility]        Past Surgical History:        Procedure Laterality Date    COLONOSCOPY      ENDOSCOPY, COLON, DIAGNOSTIC      EYE SURGERY      FRACTURE SURGERY      INCISION AND DRAINAGE Right 8/11/2019    RIGHT LOWER LEG FASCIOTOMY WITH APPLICATION WOUND VAC performed by Heri Carolina DO at [address] [place] [address] Right 8/14/2019    RIGHT LOWER EXTREMITY IRRIGATION  AND DEBRIDEMENT,  WOUND VAC CHANGE performed by Heri Carolina DO at [place] [address]      left knee    ORBITAL FRACTURE SURGERY      ROTATOR CUFF REPAIR         Social History:    Social History     Tobacco Use    Smoking status: Current Every Day Smoker     Packs/day: 0.50     Years: 20.00     Pack years: 10.00     Types: Cigarettes    Smokeless tobacco: Never Used   Substance Use Topics    Alcohol use: Yes     Comment: 6-12 beers daily                                Ready to quit: Not Answered  Counseling given: Not Answered      Vital Signs (Current): There were no vitals filed for this visit.                                            BP Readings from Last 3 Encounters:   08/19/19 110/60   08/14/19 120/71   08/11/19 83/62       NPO Status:  greater than 8 hours                                                                               BMI:   Wt Readings from Last 3 Encounters:   08/19/19 189 lb (85.7 kg)   08/11/19 170 lb (77.1 kg)   06/18/19 170 lb 4.8 oz (77.2 kg)     There is no height or weight on file to calculate BMI.    CBC:   Lab Results   Component Value Date    WBC 12.0 08/19/2019    RBC 3.83 08/19/2019    HGB 12.0 08/19/2019    HCT 35.3 08/19/2019    MCV 92.2 08/19/2019    RDW 13.8 08/19/2019     08/19/2019       CMP:   Lab Results   Component Value Date     08/19/2019    K 3.8 08/19/2019     08/19/2019    CO2 26 08/19/2019    BUN

## 2019-08-20 PROCEDURE — 6360000002 HC RX W HCPCS: Performed by: STUDENT IN AN ORGANIZED HEALTH CARE EDUCATION/TRAINING PROGRAM

## 2019-08-20 PROCEDURE — 97530 THERAPEUTIC ACTIVITIES: CPT

## 2019-08-20 PROCEDURE — 6370000000 HC RX 637 (ALT 250 FOR IP): Performed by: STUDENT IN AN ORGANIZED HEALTH CARE EDUCATION/TRAINING PROGRAM

## 2019-08-20 PROCEDURE — 97535 SELF CARE MNGMENT TRAINING: CPT

## 2019-08-20 PROCEDURE — 2580000003 HC RX 258: Performed by: STUDENT IN AN ORGANIZED HEALTH CARE EDUCATION/TRAINING PROGRAM

## 2019-08-20 PROCEDURE — 1200000000 HC SEMI PRIVATE

## 2019-08-20 PROCEDURE — C9113 INJ PANTOPRAZOLE SODIUM, VIA: HCPCS | Performed by: STUDENT IN AN ORGANIZED HEALTH CARE EDUCATION/TRAINING PROGRAM

## 2019-08-20 RX ADMIN — PANTOPRAZOLE SODIUM 40 MG: 40 INJECTION, POWDER, FOR SOLUTION INTRAVENOUS at 08:50

## 2019-08-20 RX ADMIN — Medication 10 ML: at 21:01

## 2019-08-20 RX ADMIN — ACETAMINOPHEN 650 MG: 325 TABLET, FILM COATED ORAL at 14:27

## 2019-08-20 RX ADMIN — DOCUSATE SODIUM 100 MG: 100 CAPSULE, LIQUID FILLED ORAL at 08:50

## 2019-08-20 RX ADMIN — AMOXICILLIN AND CLAVULANATE POTASSIUM 2 TABLET: 562.5; 437.5; 62.5 TABLET, MULTILAYER, EXTENDED RELEASE ORAL at 08:50

## 2019-08-20 RX ADMIN — DOCUSATE SODIUM 100 MG: 100 CAPSULE, LIQUID FILLED ORAL at 21:01

## 2019-08-20 RX ADMIN — OXYCODONE HYDROCHLORIDE 10 MG: 10 TABLET ORAL at 09:07

## 2019-08-20 RX ADMIN — OXYCODONE HYDROCHLORIDE 10 MG: 10 TABLET ORAL at 21:01

## 2019-08-20 RX ADMIN — ENOXAPARIN SODIUM 40 MG: 40 INJECTION SUBCUTANEOUS at 08:50

## 2019-08-20 RX ADMIN — Medication 10 ML: at 08:50

## 2019-08-20 RX ADMIN — Medication 10 ML: at 08:51

## 2019-08-20 RX ADMIN — OXYCODONE HYDROCHLORIDE 10 MG: 10 TABLET ORAL at 16:22

## 2019-08-20 ASSESSMENT — PAIN DESCRIPTION - ORIENTATION
ORIENTATION: RIGHT
ORIENTATION: RIGHT;OTHER (COMMENT)

## 2019-08-20 ASSESSMENT — PAIN DESCRIPTION - FREQUENCY
FREQUENCY: CONTINUOUS

## 2019-08-20 ASSESSMENT — PAIN SCALES - GENERAL
PAINLEVEL_OUTOF10: 10
PAINLEVEL_OUTOF10: 8
PAINLEVEL_OUTOF10: 10
PAINLEVEL_OUTOF10: 6
PAINLEVEL_OUTOF10: 0
PAINLEVEL_OUTOF10: 8
PAINLEVEL_OUTOF10: 10
PAINLEVEL_OUTOF10: 0

## 2019-08-20 ASSESSMENT — PAIN DESCRIPTION - PROGRESSION
CLINICAL_PROGRESSION: NOT CHANGED
CLINICAL_PROGRESSION: GRADUALLY WORSENING
CLINICAL_PROGRESSION: GRADUALLY IMPROVING
CLINICAL_PROGRESSION: NOT CHANGED
CLINICAL_PROGRESSION: GRADUALLY WORSENING
CLINICAL_PROGRESSION: GRADUALLY IMPROVING

## 2019-08-20 ASSESSMENT — PAIN DESCRIPTION - ONSET
ONSET: ON-GOING
ONSET: PROGRESSIVE
ONSET: ON-GOING
ONSET: ON-GOING

## 2019-08-20 ASSESSMENT — PAIN DESCRIPTION - DESCRIPTORS
DESCRIPTORS: SHOOTING;THROBBING
DESCRIPTORS: SHARP;BURNING
DESCRIPTORS: CONSTANT;SHOOTING;THROBBING
DESCRIPTORS: BURNING;SHARP
DESCRIPTORS: BURNING;DULL;SHARP
DESCRIPTORS: THROBBING;SHOOTING

## 2019-08-20 ASSESSMENT — PAIN DESCRIPTION - PAIN TYPE
TYPE: SURGICAL PAIN;ACUTE PAIN
TYPE: ACUTE PAIN;SURGICAL PAIN

## 2019-08-20 ASSESSMENT — PAIN DESCRIPTION - LOCATION
LOCATION: BACK;LEG
LOCATION: LEG

## 2019-08-20 ASSESSMENT — PAIN DESCRIPTION - DIRECTION: RADIATING_TOWARDS: BUTTOCKS

## 2019-08-20 NOTE — PROGRESS NOTES
Messaged Dr. Debbie Kunz about pt BP being 95/60 and requesting pain meds, she said it is okay to have

## 2019-08-20 NOTE — FLOWSHEET NOTE
Inpatient Wound Care(follow up)7409    Admit Date: 8/11/2019  3:05 PM    Reason for consult:  Wound vac management RLE      Findings:       08/20/19 1403   Wound 08/11/19 Leg Right; Lower;Medial   Date First Assessed: 08/11/19   Present on Hospital Admission: Yes  Location: Leg  Wound Location Orientation: Right; Lower;Medial   Dressing/Treatment Vacuum dressing   Wound Assessment Other (Comment)  (dressing not removed)   Wound 08/11/19 Leg Right; Lower; Lateral   Date First Assessed: 08/11/19   Present on Hospital Admission: Yes  Location: Leg  Wound Location Orientation: Right; Lower; Lateral   Dressing/Treatment Vacuum dressing   Wound Assessment Other (Comment)  (dressing not removed)   Negative Pressure Wound Therapy Leg Anterior; Lower;Right   Placement Date/Time: 08/11/19 1809   Pre-existing: No  Location: Leg  Wound Location Orientation: Anterior; Lower;Right   Wound Type Surgical   Cycle Continuous   Target Pressure (mmHg) 125   Dressing Change Due 08/21/19       Impression:    S/P I&D of the RLE 08-19-19 by ortho  Re-application of wound vac therapy in the OR. Plan:wound vac seal maintained settings at  125mm Hg negetive pressure. Leg Ace wrapped as well,will follow for wound vac dressing changes.  Mariya Dhillon 8/20/2019 2:05 PM

## 2019-08-20 NOTE — CARE COORDINATION
Called Pt's Sister Daksha Gallardo to inform that Pt ws denied [facility] stay by insurance. Referral faxed to [employer]. Daksha Gallardo prefers Pt go to [address] [facility] or [facility]. Daksha Gallardo feels that with an open wound [facility] is not clean. SW notified CM of Sister's concerns. Pt has a home to go to but lives alone and can not take care of leg wound by himself per Sister.

## 2019-08-20 NOTE — PROGRESS NOTES
[place]    IMPRESSION/RECOMMENDATIONS:        1. LYNN Stage 2, volume responsive pre-renal LYNN  (FENa: 0.1%). Urine output 2.6 L/day; resolved. 2. Rhabdomyolysis, due to prolonged decubitus, drugs; with compartment syndrome; CPK levels quite improved. 3. Acute Encephalopathy 2/2 polysubstance overdose  4. Compartment Leg Syndrome of Right Leg, s/p fasciotomy  5. Transaminitis 2/2 shock liver in the setting of Hep C  6. HCV  7.  Nutrition on clear liquid diet    PLAN:    · Continue to monitor renal function  · We will follow peripherally  · Discharge planning       Electronically signed by Chery Morgan MD on 8/20/2019 at 9:44 AM

## 2019-08-20 NOTE — PROGRESS NOTES
OT BEDSIDE TREATMENT NOTE      Date:2019  Patient Name: Anderson Torres  MRN: [id number]  : 1965  Room: [address] [place] [address]-     Per OT Eval:       Evaluating OT: [place], OTR/L      AM-PAC Daily Activity Raw Score:   Recommended Adaptive Equipment: Continue to assess for bathroom DME, LB AE, ww     Comments: Based on patient's functional performance as stated above and level of assistance needed prior to admission, this therapist believes that the patient would benefit from continued skilled OT during/following hospital stay in an effort to increase safety, functional independence with ADLs/IADLs, and quality of life.        Diagnosis: Drug overdose  Procedure: Intubated 19,  RLE fasciotomy with application of wound vac 19,  Extubated 19; 19 I &D RLE with application of wound vac  Pertinent Medical History: Arthritis, Aspiration PNA, Bipolar 1 disorder, Hep C, Schizophrenia, vertebral osteomyelitis      Precautions:  Falls, RLE NWB with wound vac,, general diet, *Allowed PROM to R ankle and foot per ortho*     Home Living: Pt lives in a [facility] home with 3 step(s) to enter and B rail(s); bed/bath on 1 floor. Reports upstairs is an apartment. Son lives next door. Bathroom setup: Pt using walk-in shower both on 1st floor and in basement with grab bars; standard toilet  Equipment owned: none     Prior Level of Function: Indep with ADLs; indep with IADLs; using no device for ambulation. Driving: yes  Occupation: retired construction     Pain Level: 7/10 RLE; provided pain meds prior to session  Communication: G ability to express needs.     Cognition: A&O: 2/4    Initiation/termination: G              Sequencing: F              Comprehension: F              Problem solving: P+              Judgement/safety: P+ continued reminders for NWB RLE during transfers and standing portions of ADL routine                Functional Assessment:    Initial Eval Status  Date: 8/15/19 Dynamic:SBA  Standing:     Static:  Min A ww    Dynamic:Min A ww Sitting:     Static:  Sup    Dynamic:SBA  Standing:     Static:  Min A ww    Dynamic:Min A ww  demo Indep dynamic sitting balance EOB during ADL tasks; Mod I dynamic standing balance during ADL tasks using device prn   Endurance/  Activity Tolerance F activity tolerance/endurance during light ADL task      Standing tolerance 5-7 minutes  Fair  Moderate task, mobility in & out of bathroom with SOB & fatigued noted  demo G activity tolerance/endurance during [id number] min ADL task      G demo of EC, pacing and proper breathing techniques   Visual/  Perceptual Glasses: None     Reports no change in vision; acuity WNL able to tell time on wall clock and read whiteboard for orientation questions          Safety P+        Poor+  Impulsive with movements & required moderate cues to maintain NWB R LE G    BUE strength/endurance See below         Education:  Pt was educated through out treatment regarding proper technique & safety with bed mobility, functional transfers & mobility, ADL compensatory strategies to ease tasks to improve safety & prevent falls and allow pt to return home safely. Comments: Upon arrival pt was in bed, sister present & agreeable for thearpy. At end of session pt was seated in chair, R LE elevated all lines and tubes intact, call light within reach. · Pt has made Fair progress towards set goals. · Continue with current plan of care    Time in: 1:15pm  Time out: 1:45pm  Total Tx Time: 30 minutes    James Beebe.  [address] [place] [address], [address] [place] [address]

## 2019-08-20 NOTE — PROGRESS NOTES
Labs:   Recent Labs     08/18/19  0606 08/19/19  0704   WBC 8.6 12.0*   HGB 11.5* 12.0*   HCT 34.6* 35.3*    379     Recent Labs     08/18/19  0606 08/19/19  0704    138   K 4.0 3.8   CL 98 101   CO2 30* 26   BUN 25* 21*   CREATININE 0.8 0.9   CALCIUM 8.5* 7.9*     No results for input(s): AST, ALT, BILIDIR, BILITOT, ALKPHOS in the last 72 hours. No results for input(s): INR in the last 72 hours. Recent Labs     08/18/19  0606 08/19/19  0704   CKTOTAL 4,290* 2,650*     No results for input(s): AST, ALT, ALB, BILIDIR, BILITOT, ALKPHOS in the last 72 hours. No results for input(s): LACTA in the last 72 hours.   No results found for: [place]  Lab Results   Component Value Date    AMMONIA 40.0 08/12/2019    AMMONIA 137.0 (H) 08/11/2019    AMMONIA 28.0 06/14/2019       Assessment:    Active Hospital Problems    Diagnosis Date Noted    Moderate protein-calorie malnutrition ([facility] [E44.0] 08/15/2019    Drug overdose, intentional, initial encounter ([facility] Anson Doles 08/11/2019   poly subastance abuse(benzos, cocaine, and methadone)  Hepatitis C   compartment syndrome  Schizophrenia  Rhabdomyolysis(OR august 19, 2019)   CONS bacteremia, ? contaminate     Plan:   cont supportive care   Augmentin discontinued by ID  Wound care  Pain control  PT/OT     DVT Prophylaxis: *scd  Diet: DIET CLEAR LIQUID;  Code Status: Full Code     PT/OT Eval Status: ordered     Dispo - psych vs alondra vs home with c       Electronically signed by Hellen Lockett MD on 8/20/2019 at 3:58 PM [facility]

## 2019-08-20 NOTE — OP NOTE
transferred  to the operating table by multiple individuals in a safe fashion with  the Anesthesia in control of the patient's C-spine and airway. Once on  the operating table, all points of pressure were identified and well  padded. The patient's right lower extremity was sterilely prepped and  draped in a standard orthopedic fashion. The wound VAC was removed. A  timeout was performed indicating the appropriate identification of the  patient, the procedure to be performed, and the site to be performed  upon. This was agreed upon by all individuals in the room. After the  timeout was performed, we performed visual inspection. He had necrotic  skin edges which were debrided and the incisions were extended. On  testing in the muscle, it had no response to Bovie, no mechanical  response, and was turning gray in nature, therefore was dead muscle. A  significant amount of muscle was debrided from both the lateral and  medial incisions. The bundles were left intact although significant  amount of anterior and lateral compartments were removed. Significant  amount of medial gastrocnemius muscle was removed. It was dead in  nature with no bleeding. Once we got back to good bleeding healthy  muscle, it was then copiously irrigated with sterile normal saline. At  this point in time, the incision seemed to still not be closed. Therefore, decision was made to use a VAC dressing. VAC dressing was  placed, sealed, and the patient was taken to the PACU in stable  condition. The plan will be to take him back most likely later this  week or early next week for split-thickness skin grafting versus closing  and possible further debridement if he continues to have muscle loss.         Lesa Perkins MD    D: 08/19/2019 16:37:34       T: 08/19/2019 21:09:51     SHERRY/CARLOS [email or web address]  Job#: [id number]     Doc#: [id number]    CC:

## 2019-08-21 ENCOUNTER — ANESTHESIA EVENT (OUTPATIENT)
Dept: OPERATING ROOM | Age: 54
DRG: 317 | End: 2019-08-21
Payer: COMMERCIAL

## 2019-08-21 LAB
ABO/RH: NORMAL
ABO/RH: NORMAL
ANION GAP SERPL CALCULATED.3IONS-SCNC: 7 MMOL/L (ref 7–16)
ANISOCYTOSIS: ABNORMAL
ANTIBODY SCREEN: NORMAL
APTT: 25.1 SEC (ref 24.5–35.1)
BASOPHILIC STIPPLING: ABNORMAL
BASOPHILS ABSOLUTE: 0 E9/L (ref 0–0.2)
BASOPHILS RELATIVE PERCENT: 0.3 % (ref 0–2)
BLOOD BANK DISPENSE STATUS: NORMAL
BLOOD BANK DISPENSE STATUS: NORMAL
BLOOD BANK PRODUCT CODE: NORMAL
BLOOD BANK PRODUCT CODE: NORMAL
BPU ID: NORMAL
BPU ID: NORMAL
BUN BLDV-MCNC: 28 MG/DL (ref 6–20)
CALCIUM SERPL-MCNC: 7.3 MG/DL (ref 8.6–10.2)
CHLORIDE BLD-SCNC: 100 MMOL/L (ref 98–107)
CO2: 26 MMOL/L (ref 22–29)
CREAT SERPL-MCNC: 0.9 MG/DL (ref 0.7–1.2)
DESCRIPTION BLOOD BANK: NORMAL
DESCRIPTION BLOOD BANK: NORMAL
EOSINOPHILS ABSOLUTE: 0 E9/L (ref 0.05–0.5)
EOSINOPHILS RELATIVE PERCENT: 1.1 % (ref 0–6)
GFR AFRICAN AMERICAN: >60
GFR NON-AFRICAN AMERICAN: >60 ML/MIN/1.73
GLUCOSE BLD-MCNC: 94 MG/DL (ref 74–99)
HCT VFR BLD CALC: 20 % (ref 37–54)
HCT VFR BLD CALC: 21.4 % (ref 37–54)
HCT VFR BLD CALC: 24.4 % (ref 37–54)
HEMOGLOBIN: 6.6 G/DL (ref 12.5–16.5)
HEMOGLOBIN: 7.1 G/DL (ref 12.5–16.5)
HEMOGLOBIN: 8.2 G/DL (ref 12.5–16.5)
INR BLD: 0.9
LYMPHOCYTES ABSOLUTE: 1.32 E9/L (ref 1.5–4)
LYMPHOCYTES RELATIVE PERCENT: 8.7 % (ref 20–42)
MCH RBC QN AUTO: 31.6 PG (ref 26–35)
MCHC RBC AUTO-ENTMCNC: 33.2 % (ref 32–34.5)
MCV RBC AUTO: 95.1 FL (ref 80–99.9)
METAMYELOCYTES RELATIVE PERCENT: 0.9 % (ref 0–1)
MONOCYTES ABSOLUTE: 0.15 E9/L (ref 0.1–0.95)
MONOCYTES RELATIVE PERCENT: 0.9 % (ref 2–12)
MYELOCYTE PERCENT: 0.9 % (ref 0–0)
NEUTROPHILS ABSOLUTE: 13.38 E9/L (ref 1.8–7.3)
NEUTROPHILS RELATIVE PERCENT: 88.7 % (ref 43–80)
PDW BLD-RTO: 14.6 FL (ref 11.5–15)
PLATELET # BLD: 501 E9/L (ref 130–450)
PMV BLD AUTO: 9.9 FL (ref 7–12)
POLYCHROMASIA: ABNORMAL
POTASSIUM REFLEX MAGNESIUM: 3.6 MMOL/L (ref 3.5–5)
PROTHROMBIN TIME: 10.6 SEC (ref 9.3–12.4)
RBC # BLD: 2.25 E12/L (ref 3.8–5.8)
SODIUM BLD-SCNC: 133 MMOL/L (ref 132–146)
TOTAL CK: 588 U/L (ref 20–200)
WBC # BLD: 14.7 E9/L (ref 4.5–11.5)

## 2019-08-21 PROCEDURE — 85730 THROMBOPLASTIN TIME PARTIAL: CPT

## 2019-08-21 PROCEDURE — 86901 BLOOD TYPING SEROLOGIC RH(D): CPT

## 2019-08-21 PROCEDURE — 6370000000 HC RX 637 (ALT 250 FOR IP): Performed by: STUDENT IN AN ORGANIZED HEALTH CARE EDUCATION/TRAINING PROGRAM

## 2019-08-21 PROCEDURE — 85025 COMPLETE CBC W/AUTO DIFF WBC: CPT

## 2019-08-21 PROCEDURE — P9016 RBC LEUKOCYTES REDUCED: HCPCS

## 2019-08-21 PROCEDURE — 80048 BASIC METABOLIC PNL TOTAL CA: CPT

## 2019-08-21 PROCEDURE — 86900 BLOOD TYPING SEROLOGIC ABO: CPT

## 2019-08-21 PROCEDURE — 6360000002 HC RX W HCPCS: Performed by: STUDENT IN AN ORGANIZED HEALTH CARE EDUCATION/TRAINING PROGRAM

## 2019-08-21 PROCEDURE — 6370000000 HC RX 637 (ALT 250 FOR IP): Performed by: INTERNAL MEDICINE

## 2019-08-21 PROCEDURE — 82550 ASSAY OF CK (CPK): CPT

## 2019-08-21 PROCEDURE — 1200000000 HC SEMI PRIVATE

## 2019-08-21 PROCEDURE — 36415 COLL VENOUS BLD VENIPUNCTURE: CPT

## 2019-08-21 PROCEDURE — 2580000003 HC RX 258: Performed by: STUDENT IN AN ORGANIZED HEALTH CARE EDUCATION/TRAINING PROGRAM

## 2019-08-21 PROCEDURE — 36430 TRANSFUSION BLD/BLD COMPNT: CPT

## 2019-08-21 PROCEDURE — 2580000003 HC RX 258: Performed by: INTERNAL MEDICINE

## 2019-08-21 PROCEDURE — 85610 PROTHROMBIN TIME: CPT

## 2019-08-21 PROCEDURE — 86923 COMPATIBILITY TEST ELECTRIC: CPT

## 2019-08-21 PROCEDURE — 86850 RBC ANTIBODY SCREEN: CPT

## 2019-08-21 PROCEDURE — 85018 HEMOGLOBIN: CPT

## 2019-08-21 PROCEDURE — 85014 HEMATOCRIT: CPT

## 2019-08-21 PROCEDURE — 94640 AIRWAY INHALATION TREATMENT: CPT

## 2019-08-21 RX ORDER — 0.9 % SODIUM CHLORIDE 0.9 %
250 INTRAVENOUS SOLUTION INTRAVENOUS ONCE
Status: COMPLETED | OUTPATIENT
Start: 2019-08-21 | End: 2019-08-21

## 2019-08-21 RX ORDER — PANTOPRAZOLE SODIUM 40 MG/1
40 TABLET, DELAYED RELEASE ORAL
Status: DISCONTINUED | OUTPATIENT
Start: 2019-08-22 | End: 2019-08-21

## 2019-08-21 RX ORDER — PANTOPRAZOLE SODIUM 40 MG/1
40 TABLET, DELAYED RELEASE ORAL
Status: DISCONTINUED | OUTPATIENT
Start: 2019-08-21 | End: 2019-08-28 | Stop reason: HOSPADM

## 2019-08-21 RX ADMIN — Medication 10 ML: at 08:52

## 2019-08-21 RX ADMIN — DOCUSATE SODIUM 100 MG: 100 CAPSULE, LIQUID FILLED ORAL at 20:39

## 2019-08-21 RX ADMIN — PANTOPRAZOLE SODIUM 40 MG: 40 TABLET, DELAYED RELEASE ORAL at 09:07

## 2019-08-21 RX ADMIN — Medication 10 ML: at 20:39

## 2019-08-21 RX ADMIN — IPRATROPIUM BROMIDE AND ALBUTEROL SULFATE 1 AMPULE: .5; 3 SOLUTION RESPIRATORY (INHALATION) at 07:58

## 2019-08-21 RX ADMIN — OXYCODONE HYDROCHLORIDE 10 MG: 10 TABLET ORAL at 01:18

## 2019-08-21 RX ADMIN — OXYCODONE HYDROCHLORIDE 10 MG: 10 TABLET ORAL at 17:31

## 2019-08-21 RX ADMIN — DOCUSATE SODIUM 100 MG: 100 CAPSULE, LIQUID FILLED ORAL at 08:52

## 2019-08-21 RX ADMIN — OXYCODONE HYDROCHLORIDE 5 MG: 5 TABLET ORAL at 09:07

## 2019-08-21 RX ADMIN — FENTANYL CITRATE 25 MCG: 0.05 INJECTION, SOLUTION INTRAMUSCULAR; INTRAVENOUS at 23:53

## 2019-08-21 RX ADMIN — OXYCODONE HYDROCHLORIDE 10 MG: 10 TABLET ORAL at 12:52

## 2019-08-21 RX ADMIN — SODIUM CHLORIDE 250 ML: 9 INJECTION, SOLUTION INTRAVENOUS at 17:30

## 2019-08-21 RX ADMIN — OXYCODONE HYDROCHLORIDE 10 MG: 10 TABLET ORAL at 21:42

## 2019-08-21 RX ADMIN — ENOXAPARIN SODIUM 40 MG: 40 INJECTION SUBCUTANEOUS at 09:07

## 2019-08-21 RX ADMIN — SODIUM CHLORIDE 250 ML: 9 INJECTION, SOLUTION INTRAVENOUS at 12:51

## 2019-08-21 ASSESSMENT — PAIN DESCRIPTION - LOCATION
LOCATION: LEG

## 2019-08-21 ASSESSMENT — PAIN DESCRIPTION - ORIENTATION
ORIENTATION: RIGHT

## 2019-08-21 ASSESSMENT — PAIN SCALES - GENERAL
PAINLEVEL_OUTOF10: 4
PAINLEVEL_OUTOF10: 7
PAINLEVEL_OUTOF10: 10
PAINLEVEL_OUTOF10: 6
PAINLEVEL_OUTOF10: 5
PAINLEVEL_OUTOF10: 9
PAINLEVEL_OUTOF10: 4
PAINLEVEL_OUTOF10: 5
PAINLEVEL_OUTOF10: 10
PAINLEVEL_OUTOF10: 7
PAINLEVEL_OUTOF10: 6

## 2019-08-21 ASSESSMENT — PAIN DESCRIPTION - ONSET
ONSET: ON-GOING

## 2019-08-21 ASSESSMENT — PAIN DESCRIPTION - DESCRIPTORS
DESCRIPTORS: CONSTANT;SHARP;STABBING
DESCRIPTORS: ACHING;DISCOMFORT;SORE
DESCRIPTORS: ACHING;CONSTANT;DISCOMFORT
DESCRIPTORS: ACHING;CONSTANT;DISCOMFORT;STABBING
DESCRIPTORS: CONSTANT;SHARP;STABBING

## 2019-08-21 ASSESSMENT — PAIN DESCRIPTION - FREQUENCY
FREQUENCY: CONTINUOUS

## 2019-08-21 ASSESSMENT — LIFESTYLE VARIABLES: SMOKING_STATUS: 1

## 2019-08-21 ASSESSMENT — PAIN DESCRIPTION - PAIN TYPE
TYPE: SURGICAL PAIN
TYPE: ACUTE PAIN;SURGICAL PAIN

## 2019-08-21 ASSESSMENT — PAIN - FUNCTIONAL ASSESSMENT: PAIN_FUNCTIONAL_ASSESSMENT: PREVENTS OR INTERFERES SOME ACTIVE ACTIVITIES AND ADLS

## 2019-08-21 ASSESSMENT — PAIN DESCRIPTION - PROGRESSION
CLINICAL_PROGRESSION: NOT CHANGED

## 2019-08-21 NOTE — PROGRESS NOTES
wound vac     · Wound Type: Multiple, Open Wounds, Surgical Wound(wounds x 2 noted to R leg with wound vac ;  Incision to R leg)     · Current Nutrition Therapies:  · Oral Diet Orders: General   · Oral Diet intake: 26-50%, 51-75%(per flowsheets )  · Oral Nutrition Supplement (ONS) Orders: Frozen Oral Supplement, Wound Healing Oral Supplement  · ONS intake: 51-75%     · Anthropometric Measures:  · Ht: 5' 7\" (170.2 cm)   · Current Body Wt: 187 lb (84.8 kg)(8/20/19, bedscale)  · Admission Body Wt: 184 lb (83.5 kg)(8/12/19, first bedscale wt)  · Usual Body Wt: 170 lb (77.1 kg)(6/13/19, bedscale)  · Weight has been flucuating since admission ; EMR shows past weight of 170# bedscale on 6/13/19  · Ideal Body Wt: 148 lb (67.1 kg), % Ideal Body 126%  · BMI Classification: BMI 25.0 - 29.9 Overweight    Nutrition Interventions:   Continue current diet, Continue current ONS  Continued Inpatient Monitoring, Coordination of Care    Nutrition Evaluation:   · Evaluation: Goals set   · Goals: Patient will consume >75% of most meals and supplements served     · Monitoring: Meal Intake, Supplement Intake, Diet Tolerance, Skin Integrity, Wound Healing, I&O, Monitor Hemodynamic Status, Monitor Bowel Function, Weight, Pertinent Labs, Chewing/Swallowing      Electronically signed by Chrissy Henderson RD, LD on 8/21/19 at 9:41 AM    Contact Number: [phone]

## 2019-08-21 NOTE — PROGRESS NOTES
Department of Orthopedic Surgery   Progress Note    Patient seen and examined. Pain controlled, improving. No new complaints or paresthesias. Wound vac in place. Denies chest pain, SOB, abdominal pain. States his numbness is improving      VITALS:  BP (!) 93/55   Pulse 94   Temp 97.7 °F (36.5 °C) (Temporal)   Resp 16   Ht 5' 7\" (1.702 m)   Wt 187 lb 14.4 oz (85.2 kg)   SpO2 95%   BMI 29.43 kg/m²     GENERAL: Alert, oriented, somnolent  MUSCULOSKELETAL:   right lower extremity:  · Wound vac C/D/I, holding suction; dressing CDI  · Compartments firm but compressible.  Thigh is soft and compressible  · Brisk cap refill to toes, foot warm and perfused  · Motor not intact at this time to leg/foot again noted  · Pressure sensation intact to toes    CBC:   Lab Results   Component Value Date    WBC 14.7 08/21/2019    HGB 7.1 08/21/2019    HCT 21.4 08/21/2019     08/21/2019       ASSESSMENT  · S/P R lower leg fasciotomy 8/11, repeat I&D 8/14  · S/P I and D, debulking 8/19    PLAN    · NWB RLE  · Pain control pr primary team  · DVT prophylaxis- per primary team  · Monitor Labs  · Trend H&H  · Will need repeat I&D and secondary closure in near future  · PT/OT  · D/C planning, SW/PT recs  · Discuss with attending

## 2019-08-21 NOTE — PROGRESS NOTES
Department of Internal Medicine  Nephrology Consult Note      SUBJECTIVE: We are following [facility] [employer] for LYNN and acid-base disturbances. Reports no complaints. PHYSICAL EXAM:      Vitals:    VITALS:  BP (!) 102/56   Pulse 91   Temp 97.2 °F (36.2 °C) (Temporal)   Resp 16   Ht 5' 7\" (1.702 m)   Wt 187 lb 14.4 oz (85.2 kg)   SpO2 92%   BMI 29.43 kg/m²   24HR INTAKE/OUTPUT:      Intake/Output Summary (Last 24 hours) at 8/21/2019 1050  Last data filed at 8/21/2019 0603  Gross per 24 hour   Intake 420 ml   Output 1425 ml   Net -1005 ml       Constitutional: Patient is awake alert no distress.   HEENT: Pupils are equal and reactive  Respiratory:  Clear to auscultation  Cardiovascular/Edema:  Regular rhythm and rate, no edema  Gastrointestinal:  Soft non-tender, present bowel sounds  Musculoskeletal: s/p fasciotomy of right leg, mild erythema and decreased swelling  Neurologic:  No focal signs, alert and oriented    Scheduled Meds:   pantoprazole  40 mg Oral QAM AC    sodium chloride  250 mL Intravenous Once    lidocaine  5 mL Intradermal Once    sodium chloride flush  10 mL Intravenous 2 times per day    docusate sodium  100 mg Oral BID    ipratropium-albuterol  1 ampule Inhalation Q4H WA    enoxaparin  40 mg Subcutaneous Daily    sodium chloride (PF)  10 mL Intravenous Daily     Continuous Infusions:    PRN Meds:.calcium carbonate, acetaminophen, sodium chloride flush, oxyCODONE **OR** oxyCODONE, fentanNYL, ondansetron, magnesium hydroxide    DATA:    CBC:   Lab Results   Component Value Date    WBC 14.7 08/21/2019    RBC 2.25 08/21/2019    HGB 6.6 08/21/2019    HCT 20.0 08/21/2019    MCV 95.1 08/21/2019    MCH 31.6 08/21/2019    MCHC 33.2 08/21/2019    RDW 14.6 08/21/2019     08/21/2019    MPV 9.9 08/21/2019     CMP:    Lab Results   Component Value Date     08/21/2019    K 3.6 08/21/2019     08/21/2019    CO2 26 08/21/2019    BUN 28 08/21/2019    CREATININE 0.9 08/21/2019 GFRAA >60 08/21/2019    LABGLOM >60 08/21/2019    GLUCOSE 94 08/21/2019    GLUCOSE 95 07/08/2011    PROT 5.0 08/15/2019    LABALBU 2.1 08/15/2019    LABALBU 3.6 07/08/2011    CALCIUM 7.3 08/21/2019    BILITOT 0.6 08/15/2019    ALKPHOS 41 08/15/2019     08/15/2019     08/15/2019     Magnesium:    Lab Results   Component Value Date    MG 2.2 08/12/2019     Phosphorus:    Lab Results   Component Value Date    PHOS 2.5 08/14/2019       UA Protein: 30  Urine Chloride: <20  Urine Creatinine: 170  Urine Osmolality: 513  Urine Potassium: 59.0  Urine Sodium: <20  FeNA: 0.1%    Total CK 8/12/19 1800: 103,200 U/L  Total CK 8/12/19 2140:  83,880 U/L  Total CK 8/13/19 0525: 66,180 U/L    Radiology Review:      Chest X-ray August 11, 2019   1. Distal tip of endotracheal tube is located of proximal tracheal   bifurcation. 2. Interval increase in amount of elevation of right hemidiaphragm,   suggesting interval volume loss. 3. Patchy bilateral lung atelectasis and/or infiltrates, greatest   within the medial right lung base           BRIEF SUMMARY OF INITIAL CONSULT:    Briefly Darby Mendoza is 48 y.o. male with history of polysubstance abuse, HCV, bipolar 1 disorder, and schizophrenia, who was admitted on 8/11/2019 by EMS who found him on the floor of his home. The patient was unresponsive, but administration of Narcan slightly improved his mentation. Upon arrival to the ER, he was found to have AMS and right lower leg compartment syndrome. He was brought into surgery to relieve the pressure. Urine drug screen showed positivity for benzos, cocaine, methadone, and amphetamine. He had an elevated creatinine level of 2 mg/dL as well as metabolic acidosis reasons for this consultation. Prior to admission the patient was taking Diazepam, Alprazolam, Lithium, and Fluoxetine.     Problems Resolved:    · Lactic acidosis, resolved  · Respiratory Failure, extubated yesterday, on 3 L O2 NC    IMPRESSION/RECOMMENDATIONS:

## 2019-08-21 NOTE — ANESTHESIA PRE PROCEDURE
Department of Anesthesiology  Preprocedure Note       Name:  Vijay Beauchamp   Age:  48 y.o.  :  1965                                          MRN:  [id number]         Date:  2019      Surgeon: Brian Prater):  Tobi Reddy MD    Procedure: REMOVAL OF WOUND VAC, POSSIBLE CLOSURE, POSSIBLE SPLIT THICKNESS SKIN GRAFT - RIGHT LEG (Right )    Medications prior to admission:   Prior to Admission medications    Medication Sig Start Date End Date Taking? Authorizing Provider   aspirin 325 MG EC tablet Take 1 tablet by mouth 2 times daily for 28 days 19  Shaunna Carrillo, DO   diazepam (VALIUM) 2 MG tablet Take 2 mg by mouth every 6 hours as needed for Anxiety    Historical Provider, MD   ALPRAZolam Terrell Bernabe) 0.5 MG tablet Take 0.5 mg by mouth nightly as needed for Sleep    Historical Provider, MD   LITHIUM CARBONATE PO Take 100 mg by mouth 2 times daily. Historical Provider, MD   FLUoxetine (PROZAC) 10 MG tablet Take 50 mg by mouth 2 times daily. Historical Provider, MD       Current medications:    No current facility-administered medications for this visit.       Current Outpatient Medications   Medication Sig Dispense Refill    aspirin 325 MG EC tablet Take 1 tablet by mouth 2 times daily for 28 days 56 tablet 0     Facility-Administered Medications Ordered in Other Visits   Medication Dose Route Frequency Provider Last Rate Last Dose    pantoprazole (PROTONIX) tablet 40 mg  40 mg Oral QAM AC Jeni Lao MD   40 mg at 19 0907    0.9 % sodium chloride bolus  250 mL Intravenous Once Jeni Lao  mL/hr at 19 1251 250 mL at 19 1251    calcium carbonate (TUMS) chewable tablet 500 mg  500 mg Oral TID PRN Shaunna Carrillo, DO   500 mg at 19    lidocaine 1 % injection 5 mL  5 mL Intradermal Once Shaunna Carrillo, DO        acetaminophen (TYLENOL) tablet 650 mg  650 mg Oral Q4H PRN Shaunna Carrillo, DO   650 mg at 19 1427    [facility] secretions [facility] 6/10/2017    Bipolar 1 disorder ([facility]     Hepatitis C     Schizophrenia ([facility]        Past Surgical History:        Procedure Laterality Date    COLONOSCOPY      ENDOSCOPY, COLON, DIAGNOSTIC      EYE SURGERY      FRACTURE SURGERY      INCISION AND DRAINAGE Right 8/11/2019    RIGHT LOWER LEG FASCIOTOMY WITH APPLICATION WOUND VAC performed by Zahida Carbajal DO at [facility] [address] Right 8/14/2019    RIGHT LOWER EXTREMITY IRRIGATION  AND DEBRIDEMENT,  WOUND VAC CHANGE performed by Zahida Carbajal DO at [facility] [address] Right 8/19/2019    RIGHT LOWER LEG IRRIGATION AND DEBRIDEMENT WITH POSSIBLE SECONDARY CLOSURE performed by Nahum Daniels MD at [place] [address]      left knee    ORBITAL FRACTURE SURGERY      ROTATOR CUFF REPAIR         Social History:    Social History     Tobacco Use    Smoking status: Current Every Day Smoker     Packs/day: 0.50     Years: 20.00     Pack years: 10.00     Types: Cigarettes    Smokeless tobacco: Never Used   Substance Use Topics    Alcohol use: Yes     Comment: 6-12 beers daily                                Ready to quit: Not Answered  Counseling given: Not Answered      Vital Signs (Current): There were no vitals filed for this visit.                                            BP Readings from Last 3 Encounters:   08/21/19 (!) 99/58   08/19/19 (!) 66/58   08/14/19 120/71       NPO Status:   NPO at 0000                                                                               BMI:   Wt Readings from Last 3 Encounters:   08/20/19 187 lb 14.4 oz (85.2 kg)   08/11/19 170 lb (77.1 kg)   06/18/19 170 lb 4.8 oz (77.2 kg)     There is no height or weight on file to calculate BMI.    CBC:   Lab Results   Component Value Date    WBC 14.7 08/21/2019    RBC 2.25 08/21/2019    HGB 6.6 08/21/2019    HCT 20.0 08/21/2019    MCV 95.1 08/21/2019    RDW 14.6 08/21/2019     08/21/2019       CMP: Lab Results   Component Value Date     08/21/2019    K 3.6 08/21/2019     08/21/2019    CO2 26 08/21/2019    BUN 28 08/21/2019    CREATININE 0.9 08/21/2019    GFRAA >60 08/21/2019    LABGLOM >60 08/21/2019    GLUCOSE 94 08/21/2019    GLUCOSE 95 07/08/2011    PROT 5.0 08/15/2019    CALCIUM 7.3 08/21/2019    BILITOT 0.6 08/15/2019    ALKPHOS 41 08/15/2019     08/15/2019     08/15/2019       POC Tests: No results for input(s): POCGLU, POCNA, POCK, POCCL, POCBUN, POCHEMO, POCHCT in the last 72 hours. Coags:   Lab Results   Component Value Date    PROTIME 13.4 08/14/2019    INR 1.2 08/14/2019    APTT 194.0 08/14/2019       HCG (If Applicable): No results found for: PREGTESTUR, PREGSERUM, HCG, HCGQUANT     ABGs:   Lab Results   Component Value Date    PHART 7.399 07/06/2011    [id number] 84.4 10/05/2010        Type & Screen (If Applicable):  No results found for: [facility]    Anesthesia Evaluation  Patient summary reviewed and Nursing notes reviewed no history of anesthetic complications:   Airway: Mallampati: II  TM distance: >3 FB     Mouth opening: > = 3 FB Dental:          Pulmonary: breath sounds clear to auscultation  (+) COPD:  current smoker          Patient did not smoke on day of surgery. ROS comment: H/o aspiration in the past   Cardiovascular:        (-) past MI and CAD      Rhythm: regular  Rate: normal           Beta Blocker:  Not on Beta Blocker         Neuro/Psych:   (+) psychiatric history: stable with treatment             ROS comment: Bipolar 1 disorder (HCC)  Schizophrenia (HCC)  Mood disorder (HCC)     GI/Hepatic/Renal:   (+) hepatitis: C,           Endo/Other:    (+) : arthritis: OA., .                  ROS comment: polysubstance abuse      S/p multiple right lower extremity I&D procedures during this admission Abdominal:       Abdomen: soft. Vascular: negative vascular ROS.                                        Anesthesia Plan      general     ASA 3 Induction: intravenous. Anesthetic plan and risks discussed with patient. Plan discussed with CRNA. Constance Tena RN   8/21/2019      DOS STAFF ADDENDUM:    Patient seen and chart reviewed. Physical exam and history updated as indicated. NPO status confirmed. Anesthesia options and plan discussed including risks benefits with patient/legal guardian and family as available. Concerns and questions addressed. Consent verbalized to proceed.   Anesthesia plan, options and intraoperative/postoperative concerns discussed with care team.    Kendrick Flores MD  8/22/2019  7:48 AM

## 2019-08-21 NOTE — PROGRESS NOTES
abuse   2. Hep C infection ( HIV neg )   3. Rhabdomyolysis/ compartment syndrome ( right leg )  s/p fasciotomy   4. Leukocytosis / Fever - improved   5. Aspiration pneumonia   6. CONS bacteremia - contamination      PLAN    1. Off abx   2.  Local wound care with wound VAC

## 2019-08-22 ENCOUNTER — ANESTHESIA (OUTPATIENT)
Dept: OPERATING ROOM | Age: 54
DRG: 317 | End: 2019-08-22
Payer: COMMERCIAL

## 2019-08-22 VITALS
SYSTOLIC BLOOD PRESSURE: 112 MMHG | RESPIRATION RATE: 8 BRPM | DIASTOLIC BLOOD PRESSURE: 65 MMHG | TEMPERATURE: 96.8 F | OXYGEN SATURATION: 100 %

## 2019-08-22 LAB
ANION GAP SERPL CALCULATED.3IONS-SCNC: 8 MMOL/L (ref 7–16)
ANISOCYTOSIS: ABNORMAL
BASOPHILS ABSOLUTE: 0 E9/L (ref 0–0.2)
BASOPHILS RELATIVE PERCENT: 0.4 % (ref 0–2)
BUN BLDV-MCNC: 19 MG/DL (ref 6–20)
CALCIUM SERPL-MCNC: 7.3 MG/DL (ref 8.6–10.2)
CHLORIDE BLD-SCNC: 105 MMOL/L (ref 98–107)
CO2: 26 MMOL/L (ref 22–29)
CREAT SERPL-MCNC: 0.9 MG/DL (ref 0.7–1.2)
EOSINOPHILS ABSOLUTE: 0.1 E9/L (ref 0.05–0.5)
EOSINOPHILS RELATIVE PERCENT: 0.9 % (ref 0–6)
GFR AFRICAN AMERICAN: >60
GFR NON-AFRICAN AMERICAN: >60 ML/MIN/1.73
GLUCOSE BLD-MCNC: 93 MG/DL (ref 74–99)
HCT VFR BLD CALC: 24.3 % (ref 37–54)
HEMOGLOBIN: 8.3 G/DL (ref 12.5–16.5)
LYMPHOCYTES ABSOLUTE: 1.27 E9/L (ref 1.5–4)
LYMPHOCYTES RELATIVE PERCENT: 12.3 % (ref 20–42)
MCH RBC QN AUTO: 31.4 PG (ref 26–35)
MCHC RBC AUTO-ENTMCNC: 34.2 % (ref 32–34.5)
MCV RBC AUTO: 92 FL (ref 80–99.9)
MONOCYTES ABSOLUTE: 0.32 E9/L (ref 0.1–0.95)
MONOCYTES RELATIVE PERCENT: 2.6 % (ref 2–12)
MYELOCYTE PERCENT: 1.8 % (ref 0–0)
NEUTROPHILS ABSOLUTE: 8.9 E9/L (ref 1.8–7.3)
NEUTROPHILS RELATIVE PERCENT: 82.5 % (ref 43–80)
OVALOCYTES: ABNORMAL
PDW BLD-RTO: 15.6 FL (ref 11.5–15)
PLATELET # BLD: 460 E9/L (ref 130–450)
PMV BLD AUTO: 9.3 FL (ref 7–12)
POIKILOCYTES: ABNORMAL
POLYCHROMASIA: ABNORMAL
POTASSIUM REFLEX MAGNESIUM: 3.6 MMOL/L (ref 3.5–5)
POTASSIUM SERPL-SCNC: 3.6 MMOL/L (ref 3.5–5)
RBC # BLD: 2.64 E12/L (ref 3.8–5.8)
SODIUM BLD-SCNC: 139 MMOL/L (ref 132–146)
TOTAL CK: 437 U/L (ref 20–200)
WBC # BLD: 10.6 E9/L (ref 4.5–11.5)

## 2019-08-22 PROCEDURE — 1200000000 HC SEMI PRIVATE

## 2019-08-22 PROCEDURE — 6370000000 HC RX 637 (ALT 250 FOR IP): Performed by: ORTHOPAEDIC SURGERY

## 2019-08-22 PROCEDURE — 97530 THERAPEUTIC ACTIVITIES: CPT

## 2019-08-22 PROCEDURE — 36415 COLL VENOUS BLD VENIPUNCTURE: CPT

## 2019-08-22 PROCEDURE — 3700000000 HC ANESTHESIA ATTENDED CARE: Performed by: ORTHOPAEDIC SURGERY

## 2019-08-22 PROCEDURE — 6370000000 HC RX 637 (ALT 250 FOR IP): Performed by: STUDENT IN AN ORGANIZED HEALTH CARE EDUCATION/TRAINING PROGRAM

## 2019-08-22 PROCEDURE — 80048 BASIC METABOLIC PNL TOTAL CA: CPT

## 2019-08-22 PROCEDURE — 82550 ASSAY OF CK (CPK): CPT

## 2019-08-22 PROCEDURE — 85025 COMPLETE CBC W/AUTO DIFF WBC: CPT

## 2019-08-22 PROCEDURE — 15101 SPLT AGRFT T/A/L EA ADDL 100: CPT | Performed by: ORTHOPAEDIC SURGERY

## 2019-08-22 PROCEDURE — 13160 SEC CLSR SURG WND/DEHSN XTN: CPT | Performed by: ORTHOPAEDIC SURGERY

## 2019-08-22 PROCEDURE — 2580000003 HC RX 258: Performed by: NURSE ANESTHETIST, CERTIFIED REGISTERED

## 2019-08-22 PROCEDURE — 3700000001 HC ADD 15 MINUTES (ANESTHESIA): Performed by: ORTHOPAEDIC SURGERY

## 2019-08-22 PROCEDURE — 97168 OT RE-EVAL EST PLAN CARE: CPT

## 2019-08-22 PROCEDURE — 6360000002 HC RX W HCPCS

## 2019-08-22 PROCEDURE — 2709999900 HC NON-CHARGEABLE SUPPLY: Performed by: ORTHOPAEDIC SURGERY

## 2019-08-22 PROCEDURE — 7100000001 HC PACU RECOVERY - ADDTL 15 MIN: Performed by: ORTHOPAEDIC SURGERY

## 2019-08-22 PROCEDURE — 6360000002 HC RX W HCPCS: Performed by: STUDENT IN AN ORGANIZED HEALTH CARE EDUCATION/TRAINING PROGRAM

## 2019-08-22 PROCEDURE — 0KBS0ZZ EXCISION OF RIGHT LOWER LEG MUSCLE, OPEN APPROACH: ICD-10-PCS | Performed by: ORTHOPAEDIC SURGERY

## 2019-08-22 PROCEDURE — 2500000003 HC RX 250 WO HCPCS: Performed by: NURSE ANESTHETIST, CERTIFIED REGISTERED

## 2019-08-22 PROCEDURE — 15100 SPLT AGRFT T/A/L 1ST 100SQCM: CPT | Performed by: ORTHOPAEDIC SURGERY

## 2019-08-22 PROCEDURE — 7100000000 HC PACU RECOVERY - FIRST 15 MIN: Performed by: ORTHOPAEDIC SURGERY

## 2019-08-22 PROCEDURE — 3600000002 HC SURGERY LEVEL 2 BASE: Performed by: ORTHOPAEDIC SURGERY

## 2019-08-22 PROCEDURE — 6370000000 HC RX 637 (ALT 250 FOR IP): Performed by: INTERNAL MEDICINE

## 2019-08-22 PROCEDURE — 6360000002 HC RX W HCPCS: Performed by: ANESTHESIOLOGY

## 2019-08-22 PROCEDURE — 6360000002 HC RX W HCPCS: Performed by: NURSE ANESTHETIST, CERTIFIED REGISTERED

## 2019-08-22 PROCEDURE — 94640 AIRWAY INHALATION TREATMENT: CPT

## 2019-08-22 PROCEDURE — 3600000012 HC SURGERY LEVEL 2 ADDTL 15MIN: Performed by: ORTHOPAEDIC SURGERY

## 2019-08-22 RX ORDER — DIAPER,BRIEF,INFANT-TODD,DISP
EACH MISCELLANEOUS PRN
Status: DISCONTINUED | OUTPATIENT
Start: 2019-08-22 | End: 2019-08-22 | Stop reason: ALTCHOICE

## 2019-08-22 RX ORDER — 0.9 % SODIUM CHLORIDE 0.9 %
250 INTRAVENOUS SOLUTION INTRAVENOUS ONCE
Status: DISCONTINUED | OUTPATIENT
Start: 2019-08-22 | End: 2019-08-28 | Stop reason: HOSPADM

## 2019-08-22 RX ORDER — FENTANYL CITRATE 50 UG/ML
INJECTION, SOLUTION INTRAMUSCULAR; INTRAVENOUS PRN
Status: DISCONTINUED | OUTPATIENT
Start: 2019-08-22 | End: 2019-08-22 | Stop reason: SDUPTHER

## 2019-08-22 RX ORDER — DEXAMETHASONE SODIUM PHOSPHATE 10 MG/ML
INJECTION INTRAMUSCULAR; INTRAVENOUS PRN
Status: DISCONTINUED | OUTPATIENT
Start: 2019-08-22 | End: 2019-08-22 | Stop reason: SDUPTHER

## 2019-08-22 RX ORDER — PROPOFOL 10 MG/ML
INJECTION, EMULSION INTRAVENOUS PRN
Status: DISCONTINUED | OUTPATIENT
Start: 2019-08-22 | End: 2019-08-22 | Stop reason: SDUPTHER

## 2019-08-22 RX ORDER — LIDOCAINE HYDROCHLORIDE 20 MG/ML
INJECTION, SOLUTION INTRAVENOUS PRN
Status: DISCONTINUED | OUTPATIENT
Start: 2019-08-22 | End: 2019-08-22 | Stop reason: SDUPTHER

## 2019-08-22 RX ORDER — SODIUM CHLORIDE 0.9 % (FLUSH) 0.9 %
10 SYRINGE (ML) INJECTION PRN
Status: DISCONTINUED | OUTPATIENT
Start: 2019-08-22 | End: 2019-08-28 | Stop reason: HOSPADM

## 2019-08-22 RX ORDER — CEFAZOLIN SODIUM 2 G/50ML
2 SOLUTION INTRAVENOUS EVERY 8 HOURS
Status: COMPLETED | OUTPATIENT
Start: 2019-08-22 | End: 2019-08-23

## 2019-08-22 RX ORDER — MAGNESIUM CARB/ALUMINUM HYDROX 105-160MG
TABLET,CHEWABLE ORAL PRN
Status: DISCONTINUED | OUTPATIENT
Start: 2019-08-22 | End: 2019-08-22 | Stop reason: ALTCHOICE

## 2019-08-22 RX ORDER — EPINEPHRINE NASAL SOLUTION 1 MG/ML
SOLUTION NASAL PRN
Status: DISCONTINUED | OUTPATIENT
Start: 2019-08-22 | End: 2019-08-22 | Stop reason: ALTCHOICE

## 2019-08-22 RX ORDER — SODIUM CHLORIDE 9 MG/ML
INJECTION, SOLUTION INTRAVENOUS CONTINUOUS PRN
Status: DISCONTINUED | OUTPATIENT
Start: 2019-08-22 | End: 2019-08-22 | Stop reason: SDUPTHER

## 2019-08-22 RX ORDER — DOCUSATE SODIUM 100 MG/1
100 CAPSULE, LIQUID FILLED ORAL 2 TIMES DAILY
Status: DISCONTINUED | OUTPATIENT
Start: 2019-08-22 | End: 2019-08-28 | Stop reason: HOSPADM

## 2019-08-22 RX ORDER — MIDAZOLAM HYDROCHLORIDE 1 MG/ML
INJECTION INTRAMUSCULAR; INTRAVENOUS PRN
Status: DISCONTINUED | OUTPATIENT
Start: 2019-08-22 | End: 2019-08-22 | Stop reason: SDUPTHER

## 2019-08-22 RX ORDER — ONDANSETRON 2 MG/ML
INJECTION INTRAMUSCULAR; INTRAVENOUS PRN
Status: DISCONTINUED | OUTPATIENT
Start: 2019-08-22 | End: 2019-08-22 | Stop reason: SDUPTHER

## 2019-08-22 RX ORDER — SODIUM CHLORIDE 0.9 % (FLUSH) 0.9 %
10 SYRINGE (ML) INJECTION EVERY 12 HOURS SCHEDULED
Status: DISCONTINUED | OUTPATIENT
Start: 2019-08-22 | End: 2019-08-28 | Stop reason: HOSPADM

## 2019-08-22 RX ORDER — CEFAZOLIN SODIUM 1 G/3ML
INJECTION, POWDER, FOR SOLUTION INTRAMUSCULAR; INTRAVENOUS PRN
Status: DISCONTINUED | OUTPATIENT
Start: 2019-08-22 | End: 2019-08-22 | Stop reason: SDUPTHER

## 2019-08-22 RX ORDER — ROCURONIUM BROMIDE 10 MG/ML
INJECTION, SOLUTION INTRAVENOUS PRN
Status: DISCONTINUED | OUTPATIENT
Start: 2019-08-22 | End: 2019-08-22 | Stop reason: SDUPTHER

## 2019-08-22 RX ADMIN — ROCURONIUM BROMIDE 10 MG: 10 INJECTION, SOLUTION INTRAVENOUS at 09:19

## 2019-08-22 RX ADMIN — DEXAMETHASONE SODIUM PHOSPHATE 10 MG: 10 INJECTION INTRAMUSCULAR; INTRAVENOUS at 09:20

## 2019-08-22 RX ADMIN — SODIUM CHLORIDE: 9 INJECTION, SOLUTION INTRAVENOUS at 11:14

## 2019-08-22 RX ADMIN — HYDROMORPHONE HYDROCHLORIDE 0.5 MG: 1 INJECTION, SOLUTION INTRAMUSCULAR; INTRAVENOUS; SUBCUTANEOUS at 12:00

## 2019-08-22 RX ADMIN — ONDANSETRON HYDROCHLORIDE 4 MG: 2 INJECTION, SOLUTION INTRAMUSCULAR; INTRAVENOUS at 09:20

## 2019-08-22 RX ADMIN — OXYCODONE HYDROCHLORIDE 10 MG: 10 TABLET ORAL at 02:20

## 2019-08-22 RX ADMIN — OXYCODONE HYDROCHLORIDE 10 MG: 10 TABLET ORAL at 15:37

## 2019-08-22 RX ADMIN — OXYCODONE HYDROCHLORIDE 10 MG: 10 TABLET ORAL at 06:17

## 2019-08-22 RX ADMIN — OXYCODONE HYDROCHLORIDE 10 MG: 10 TABLET ORAL at 19:44

## 2019-08-22 RX ADMIN — PROPOFOL 200 MG: 10 INJECTION, EMULSION INTRAVENOUS at 09:19

## 2019-08-22 RX ADMIN — MIDAZOLAM HYDROCHLORIDE 2 MG: 1 INJECTION, SOLUTION INTRAMUSCULAR; INTRAVENOUS at 09:11

## 2019-08-22 RX ADMIN — PANTOPRAZOLE SODIUM 40 MG: 40 TABLET, DELAYED RELEASE ORAL at 06:17

## 2019-08-22 RX ADMIN — FENTANYL CITRATE 50 MCG: 50 INJECTION, SOLUTION INTRAMUSCULAR; INTRAVENOUS at 09:19

## 2019-08-22 RX ADMIN — SODIUM CHLORIDE: 9 INJECTION, SOLUTION INTRAVENOUS at 09:12

## 2019-08-22 RX ADMIN — IPRATROPIUM BROMIDE AND ALBUTEROL SULFATE 1 AMPULE: .5; 3 SOLUTION RESPIRATORY (INHALATION) at 07:38

## 2019-08-22 RX ADMIN — LIDOCAINE HYDROCHLORIDE 60 MG: 20 INJECTION, SOLUTION INTRAVENOUS at 09:19

## 2019-08-22 RX ADMIN — CEFAZOLIN 2000 MG: 1 INJECTION, POWDER, FOR SOLUTION INTRAMUSCULAR; INTRAVENOUS at 09:25

## 2019-08-22 RX ADMIN — HYDROMORPHONE HYDROCHLORIDE 0.5 MG: 1 INJECTION, SOLUTION INTRAMUSCULAR; INTRAVENOUS; SUBCUTANEOUS at 12:05

## 2019-08-22 RX ADMIN — CEFAZOLIN SODIUM 2 G: 2 SOLUTION INTRAVENOUS at 16:58

## 2019-08-22 RX ADMIN — DOCUSATE SODIUM 100 MG: 100 CAPSULE, LIQUID FILLED ORAL at 14:15

## 2019-08-22 RX ADMIN — OXYCODONE HYDROCHLORIDE 10 MG: 10 TABLET ORAL at 23:45

## 2019-08-22 ASSESSMENT — PAIN DESCRIPTION - PAIN TYPE
TYPE: SURGICAL PAIN

## 2019-08-22 ASSESSMENT — PULMONARY FUNCTION TESTS
PIF_VALUE: 17
PIF_VALUE: 3
PIF_VALUE: 17
PIF_VALUE: 16
PIF_VALUE: 17
PIF_VALUE: 16
PIF_VALUE: 17
PIF_VALUE: 17
PIF_VALUE: 1
PIF_VALUE: 7
PIF_VALUE: 17
PIF_VALUE: 19
PIF_VALUE: 2
PIF_VALUE: 7
PIF_VALUE: 17
PIF_VALUE: 7
PIF_VALUE: 17
PIF_VALUE: 16
PIF_VALUE: 16
PIF_VALUE: 17
PIF_VALUE: 16
PIF_VALUE: 17
PIF_VALUE: 17
PIF_VALUE: 7
PIF_VALUE: 17
PIF_VALUE: 1
PIF_VALUE: 17
PIF_VALUE: 17
PIF_VALUE: 16
PIF_VALUE: 17
PIF_VALUE: 16
PIF_VALUE: 0
PIF_VALUE: 17
PIF_VALUE: 17
PIF_VALUE: 16
PIF_VALUE: 16
PIF_VALUE: 7
PIF_VALUE: 2
PIF_VALUE: 7
PIF_VALUE: 17
PIF_VALUE: 16
PIF_VALUE: 0
PIF_VALUE: 17
PIF_VALUE: 7
PIF_VALUE: 16
PIF_VALUE: 17
PIF_VALUE: 7
PIF_VALUE: 17
PIF_VALUE: 1
PIF_VALUE: 17
PIF_VALUE: 4
PIF_VALUE: 16
PIF_VALUE: 7
PIF_VALUE: 17
PIF_VALUE: 7
PIF_VALUE: 16
PIF_VALUE: 17
PIF_VALUE: 7
PIF_VALUE: 16
PIF_VALUE: 16
PIF_VALUE: 17
PIF_VALUE: 1
PIF_VALUE: 17
PIF_VALUE: 17
PIF_VALUE: 16
PIF_VALUE: 16
PIF_VALUE: 1
PIF_VALUE: 0
PIF_VALUE: 7
PIF_VALUE: 17
PIF_VALUE: 7
PIF_VALUE: 17
PIF_VALUE: 7
PIF_VALUE: 1
PIF_VALUE: 17
PIF_VALUE: 16
PIF_VALUE: 17
PIF_VALUE: 17
PIF_VALUE: 1
PIF_VALUE: 16
PIF_VALUE: 8

## 2019-08-22 ASSESSMENT — PAIN DESCRIPTION - LOCATION
LOCATION: LEG

## 2019-08-22 ASSESSMENT — PAIN DESCRIPTION - ORIENTATION
ORIENTATION: RIGHT

## 2019-08-22 ASSESSMENT — PAIN DESCRIPTION - DESCRIPTORS
DESCRIPTORS: BURNING
DESCRIPTORS: ACHING;CONSTANT;DISCOMFORT;SORE;TENDER
DESCRIPTORS: BURNING;CONSTANT
DESCRIPTORS: ACHING;BURNING;DISCOMFORT;SORE
DESCRIPTORS: ACHING;CONSTANT;DISCOMFORT;SORE
DESCRIPTORS: ACHING;CONSTANT;DISCOMFORT;SORE
DESCRIPTORS: ACHING;BURNING;DISCOMFORT;SORE

## 2019-08-22 ASSESSMENT — PAIN SCALES - GENERAL
PAINLEVEL_OUTOF10: 10
PAINLEVEL_OUTOF10: 8
PAINLEVEL_OUTOF10: 0
PAINLEVEL_OUTOF10: 3
PAINLEVEL_OUTOF10: 4
PAINLEVEL_OUTOF10: 10
PAINLEVEL_OUTOF10: 10
PAINLEVEL_OUTOF10: 5
PAINLEVEL_OUTOF10: 9
PAINLEVEL_OUTOF10: 10
PAINLEVEL_OUTOF10: 7
PAINLEVEL_OUTOF10: 8
PAINLEVEL_OUTOF10: 6
PAINLEVEL_OUTOF10: 0
PAINLEVEL_OUTOF10: 5
PAINLEVEL_OUTOF10: 10

## 2019-08-22 ASSESSMENT — PAIN DESCRIPTION - PROGRESSION
CLINICAL_PROGRESSION: NOT CHANGED
CLINICAL_PROGRESSION: NOT CHANGED

## 2019-08-22 ASSESSMENT — PAIN DESCRIPTION - FREQUENCY
FREQUENCY: CONTINUOUS

## 2019-08-22 ASSESSMENT — PAIN DESCRIPTION - ONSET
ONSET: ON-GOING

## 2019-08-22 NOTE — CARE COORDINATION
8/22/19, SW met with patient at bedside along with Marta-sister. Discussed patient needing SNF Rehab and patient benefiting from rehab. Patient chose [facility] in [place] as first choice and [facility] in [place] as second choice. Referral made to Jena at [facility] in [address] to hear back from Henderson. Patient and sister-Marta informed as well. SW to follow.

## 2019-08-22 NOTE — PROGRESS NOTES
pillows and bed mechanics to improve interaction with environment, overall functioning and decrease/prevent edema and contractures. After session, patient in position as stated above with all devices within reach, all lines and tubes intact, nursing notified. Pt required cues and education as noted above for safe facilitation and completion of tasks. During functional activites and ADLs pt educated on proper hand placement, safety technique, sequencing, and energy conservation techniques. Therapist provided skilled monitoring of patient's response during treatment session. Prior to and at the end of session, environmental modifications/line management completed for patients safety and efficiency of treatment session. Eval Complexity: Re-Eval    Assessment of current deficits   Functional mobility [x]  ADLs [x] Strength []  Cognition [x]  Functional transfers  [x] IADLs [x] Safety Awareness [x]  Endurance [x]  Fine Motor Coordination [] Balance [x] Vision/perception [] Sensation []   Gross Motor Coordination [] ROM [] Delirium []                  Motor Control []    Plan of Care:  ADL retraining [x]   Equipment needs [x]   Neuromuscular re-education [] Energy Conservation Techniques [x]  Functional Transfer training [x] Patient and/or Family Education [x]  Functional Mobility training [x]  Environmental Modifications [x]  Cognitive re-training []   Compensatory techniques for ADLs [x]  Splinting Needs []   Positioning to improve overall function [x]   Therapeutic Activity [x]                       Therapeutic Exercise  [x]  Visual/Perceptual: []    Delirium prevention/treatment  [x]   Other:  []    Rehab Potential: Good for established goals    Patient / Family Goal: None stated     Patient and/or family were instructed/educated on diagnosis, prognosis/goals and plan of care. Demonstrated fair understanding, further information may be needed.     [] Malnutrition indicators have been identified and nursing has been notified to ensure a dietitian consult is ordered.       RE-Evaluation + 15 min timed tx     Tx time in: 1425  Tx time out: [facility], OTR/L  [id number]

## 2019-08-22 NOTE — OP NOTE
[address]                  [address] [place],  [facility]                                OPERATIVE REPORT    PATIENT NAME: Donita Mon                    :        1965  MED REC NO:   [id number]                            ROOM:       7678  ACCOUNT NO:   [de-identified]                           ADMIT DATE: 2019  PROVIDER:     Esther Burgos MD    DATE OF PROCEDURE:  2019    BRIEF HISTORY:  The patient is a 66-year-old male whom my partner and I  _____ operating room on various occasions for compartment syndrome  suffered from a drug overdose laying on his right lower extremity. In  his last operating room visit, we debulked a significant amount of dead  muscle in his anterior and lateral compartments and some from the medial  side of his gastroc. It was still unable to be closed. We continued  wound VAC therapy and decided to bring him back today for closure of the  lateral fasciotomy, secondary closure of the lateral fasciotomy plus  attempted closure of the medial fasciotomy and possible split-thickness  skin grafting. I did explain to the patient that there is a chance that  we will not be able to close this without the _____ full thickness skin  grafting which could potentially fail and require further surgery. He  understands this. He understands he will limited function of his right  leg in the future due to the significant amount of muscle loss. He  understands the risks of death from anesthesia, infection, etc. from the  surgery and decided to go forward with the procedure. PREOPERATIVE DIAGNOSIS:  Bilateral right lower extremity fasciotomy with  pseudo-compartment syndrome. POSTOPERATIVE DIAGNOSIS:  Bilateral right lower extremity fasciotomy  pseudo-compartment syndrome. PROCEDURE PERFORMED:  1.   Excisional debridement of skin, subcutaneous tissue, muscle and  fascia, fasciotomies of the right leg, 200 cm2.  2. Secondary closure lateral fasciotomy right leg with extensive wound. 3.  Auto split-thickness skin grafting of a 30 x 10 cm or 300 cm2 medial  fasciotomy wound. 4.  Wound VAC therapy. SURGEON:  Stacie Pritchard MD    ASSISTANT:  Kenneth Zuniga DO, resident and Aga Tejeda,  resident. ESTIMATED BLOOD LOSS:  300 mL. FLUIDS:  Crystalloid. ANTIBIOTICS:  The patient was given 2 gm of Ancef IV preoperatively. COMPLICATIONS:  None. PACKS AND DRAINS:  No packs or drains. ANESTHESIA:  General endotracheal.    PROCEDURE NOTE:  The patient was brought into the operating room in  supine position on the hospital room bed. The patient was transferred  to the operating table by multiple individuals in a safe fashion with  Anesthesia in control of the patient's C-spine and airway. Once on the  operating table, all points of pressure were identified and well padded. The patient's right lower extremity was sterilely prepped and draped in  a standard orthopedic fashion. A timeout was performed indicating  appropriate identification of the patient, procedure to be performed,  and the site to be performed upon. This was agreed upon by all  individuals in the room. After the timeout was performed, an excisional  debridement was performed of both the medial and lateral fasciotomies by  debriding the skin edges, necrotic, subcutaneous fat, fascia, and a  small amount of dead muscle. Once they were meticulously debrided, the  wound was copiously irrigated with sterile normal saline through gravity  lavage. At this point in time, the lateral side of the wound was closed  with retention sutures and 2-0 nylon in a tension-free fashion. Attention was turned to the medial side where an incision was made that  would need auto split-thickness skin grafting. The dermatome was set to  take a 4 inch wide x 0.12 micrometer skin graft which was taken in two  different swipes of approximately 15 cm.   These were taken through the  press and then utilized on the medial side of the leg for skin graft  coverage and held down with 5-0 chromic suture. We did have an issue  with a friable vessel bleeding; therefore, our blood loss was around  300, but this was able to be contained by tying off the vessel and using  Jenise. After the skin graft was in good position, Adaptic was placed  over the skin graft and a sponge was cut and filled the skin grafted  area and it was sealed down with a wound VAC. The lateral incision was  covered with a sterile dressing and the donor sites were covered with  Telfa soaked in lidocaine with epinephrine. The patient was reversed  from anesthesia without complication, taken to PACU in stable condition. POSTOPERATIVE PLAN:  Includes the VAC in place for five days. On day 5,  we will remove VAC and start Xeroform dry dressing. Also, we will most  certainly convert the donor site dressings to Xeroform on postoperative  two or three depending on how much bleeding he is having; otherwise, we  will continue to follow him closely.         Juanita Aly MD    D: 08/22/2019 11:30:03       T: 08/22/2019 13:18:38     [email or web address]  Job#: [id number]     Doc#: [id number]    CC:

## 2019-08-22 NOTE — BRIEF OP NOTE
Brief Postoperative Note  ______________________________________________________________    Patient: Allyson Stock  YOB: 1965  MRN: [id number]  Date of Procedure: 8/11/2019    Pre-Op Diagnosis: COMPARTMENT SYNDROME RIGHT LOWER LEG    Post-Op Diagnosis: Same       Procedure(s):  RIGHT LOWER LEG FASCIOTOMY WITH APPLICATION WOUND VAC    Anesthesia: General    Surgeon(s):  Jeronimo Weaver DO    Assistant: Reginaldo JULES    Estimated Blood Loss (mL): less than 50     Complications: None    Specimens:   * No specimens in log *    Implants:  * No implants in log *      Drains:   Negative Pressure Wound Therapy Leg Anterior; Lower;Right (Active)       NG/OG/NJ/NE Tube Orogastric Right mouth (Active)       Urethral Catheter Temperature probe 16 fr (Active)   Catheter Indications Need for fluid management in critically ill patients in a critical care setting not able to be managed by other means such as BSC with hat, bedpan, urinal, condom catheter, or short term intermittent urethral catherization 8/11/2019  2:00 PM   Urine Color Yellow 8/11/2019  2:00 PM       Urethral Catheter Non-latex (Active)       Findings: see op note    Diane Gross DO  Date: 8/11/2019  Time: 6:25 PM
Anterior; Lower;Right (Removed)   Cycle Continuous 8/19/2019  4:30 PM   Target Pressure (mmHg) 125 8/19/2019  4:30 PM   Dressing Status Clean;Dry; Intact 8/19/2019  4:30 PM   Drainage Amount Scant 8/19/2019  4:30 PM   Drainage Description Serosanguinous 8/19/2019  4:30 PM   Nayana-wound Assessment RODNEY 8/19/2019  5:45 PM       [REMOVED] NG/OG/NJ/NE Tube Orogastric Right mouth (Removed)   Surrounding Skin Dry; Intact 8/12/2019  6:00 AM   Securement device Yes 8/12/2019  6:00 AM   Status Clamped 8/12/2019  6:00 AM   Placement Verified by X-Ray (Initial) 8/12/2019  6:00 AM       [REMOVED] Urethral Catheter Non-latex (Removed)       Findings: See op note    Geena Jones DO  Date: 8/22/2019  Time: 11:26 AM

## 2019-08-22 NOTE — PROGRESS NOTES
Physical Therapy  Treatment Note    Name: Chastity Morocho  : 1965  MRN: [id number]    Date of Service: 2019    Evaluating PT:  Virgilio Ortega, PT, DPT [id number]    Room #:  5210/5210-A  Diagnosis:  Drug overdose   Procedures:  Intubated 19,   RLE fasciotomy with application of wound vac 19,  Extubated 19,  Debridement of R leg muscle and fascia 19  PMHx:  Arthritis, aspiration PNA of both lungs d/t gastric secretions, Bipolar 1 disorder, Hep C, schizophrenia, hx of polysubstance abuse, RLE compartment syndrome, [id number] AND REAPPLICATION OF WOUND VAC, P CLOSURE, AND SPLIT THICKNESS SKIN GRAFT - RIGHT LEG  Precautions:  Falls, O2, NWB RLE, Wound Vac to RLE, CO, **PROM to R ankle and foot per orthopedic surgery**  Equipment Needs:  [employer]    Pt lives alone in a 2 story home with 3 stairs to enter and 2 rail(s). Bedroom and bathroom are on the first level. Pt reports second floor is an apartment and he lives on first floor. Son lives next door. Pt ambulated with no AD and was independent PTA. Re-evaluation d/t procedure on 19    Re Evaluation  Date: 8/15/19  Treatment  19 Short Term/ Long Term   Goals   AM-PAC 6 Clicks  41/04    Was pt agreeable to Eval/treatment? Yes Yes    Does pt have pain? 4/10 RLE pain  9-10/10    Bed Mobility  Rolling: NT  Supine to sit: Min A  Sit to supine: NT  Scooting: Min A to EOB Rolling: NT  Supine to sit: Supervision  Sit to supine: Supervision  Scooting: Supervision to EOB Modified Independent     Transfers Sit to stand: Min A  Stand to sit: Min A  Stand pivot: Min A with [employer] Sit to stand: SBA  Stand to sit:SBA  Stand pivot: SBA with [employer] Sit to stand: Independent   Stand to sit:  Independent   Stand pivot: Modified Independent with [employer]   Ambulation    40 feet with [employer] Min A 10 feet with ww SBA maintained NWB on RLE >100 feet with [employer] Modified Independent     Stair negotiation: ascended and descended  NT NT >2 steps with 2 rail Min A   ROM

## 2019-08-23 LAB
ANION GAP SERPL CALCULATED.3IONS-SCNC: 10 MMOL/L (ref 7–16)
BASOPHILS ABSOLUTE: 0.02 E9/L (ref 0–0.2)
BASOPHILS RELATIVE PERCENT: 0.1 % (ref 0–2)
BUN BLDV-MCNC: 13 MG/DL (ref 6–20)
CALCIUM SERPL-MCNC: 7 MG/DL (ref 8.6–10.2)
CHLORIDE BLD-SCNC: 104 MMOL/L (ref 98–107)
CO2: 24 MMOL/L (ref 22–29)
CREAT SERPL-MCNC: 0.7 MG/DL (ref 0.7–1.2)
EOSINOPHILS ABSOLUTE: 0.01 E9/L (ref 0.05–0.5)
EOSINOPHILS RELATIVE PERCENT: 0.1 % (ref 0–6)
GFR AFRICAN AMERICAN: >60
GFR NON-AFRICAN AMERICAN: >60 ML/MIN/1.73
GLUCOSE BLD-MCNC: 123 MG/DL (ref 74–99)
HCT VFR BLD CALC: 26.2 % (ref 37–54)
HEMOGLOBIN: 8.9 G/DL (ref 12.5–16.5)
IMMATURE GRANULOCYTES #: 0.34 E9/L
IMMATURE GRANULOCYTES %: 2 % (ref 0–5)
LYMPHOCYTES ABSOLUTE: 1.76 E9/L (ref 1.5–4)
LYMPHOCYTES RELATIVE PERCENT: 10.2 % (ref 20–42)
MAGNESIUM: 1.9 MG/DL (ref 1.6–2.6)
MCH RBC QN AUTO: 30.8 PG (ref 26–35)
MCHC RBC AUTO-ENTMCNC: 34 % (ref 32–34.5)
MCV RBC AUTO: 90.7 FL (ref 80–99.9)
MONOCYTES ABSOLUTE: 1.17 E9/L (ref 0.1–0.95)
MONOCYTES RELATIVE PERCENT: 6.8 % (ref 2–12)
NEUTROPHILS ABSOLUTE: 14.01 E9/L (ref 1.8–7.3)
NEUTROPHILS RELATIVE PERCENT: 80.8 % (ref 43–80)
PDW BLD-RTO: 15.6 FL (ref 11.5–15)
PLATELET # BLD: 519 E9/L (ref 130–450)
PMV BLD AUTO: 9.4 FL (ref 7–12)
POTASSIUM REFLEX MAGNESIUM: 3.4 MMOL/L (ref 3.5–5)
POTASSIUM SERPL-SCNC: 3.4 MMOL/L (ref 3.5–5)
RBC # BLD: 2.89 E12/L (ref 3.8–5.8)
SODIUM BLD-SCNC: 138 MMOL/L (ref 132–146)
TOTAL CK: 257 U/L (ref 20–200)
WBC # BLD: 17.3 E9/L (ref 4.5–11.5)

## 2019-08-23 PROCEDURE — 6370000000 HC RX 637 (ALT 250 FOR IP): Performed by: STUDENT IN AN ORGANIZED HEALTH CARE EDUCATION/TRAINING PROGRAM

## 2019-08-23 PROCEDURE — 2580000003 HC RX 258: Performed by: STUDENT IN AN ORGANIZED HEALTH CARE EDUCATION/TRAINING PROGRAM

## 2019-08-23 PROCEDURE — 80048 BASIC METABOLIC PNL TOTAL CA: CPT

## 2019-08-23 PROCEDURE — 83735 ASSAY OF MAGNESIUM: CPT

## 2019-08-23 PROCEDURE — 6360000002 HC RX W HCPCS: Performed by: STUDENT IN AN ORGANIZED HEALTH CARE EDUCATION/TRAINING PROGRAM

## 2019-08-23 PROCEDURE — 1200000000 HC SEMI PRIVATE

## 2019-08-23 PROCEDURE — 85025 COMPLETE CBC W/AUTO DIFF WBC: CPT

## 2019-08-23 PROCEDURE — 82550 ASSAY OF CK (CPK): CPT

## 2019-08-23 PROCEDURE — 6370000000 HC RX 637 (ALT 250 FOR IP): Performed by: INTERNAL MEDICINE

## 2019-08-23 PROCEDURE — 36415 COLL VENOUS BLD VENIPUNCTURE: CPT

## 2019-08-23 RX ADMIN — FENTANYL CITRATE 25 MCG: 0.05 INJECTION, SOLUTION INTRAMUSCULAR; INTRAVENOUS at 01:22

## 2019-08-23 RX ADMIN — OXYCODONE HYDROCHLORIDE 10 MG: 10 TABLET ORAL at 16:48

## 2019-08-23 RX ADMIN — Medication 10 ML: at 01:05

## 2019-08-23 RX ADMIN — Medication 10 ML: at 20:53

## 2019-08-23 RX ADMIN — CEFAZOLIN SODIUM 2 G: 2 SOLUTION INTRAVENOUS at 01:05

## 2019-08-23 RX ADMIN — OXYCODONE HYDROCHLORIDE 10 MG: 10 TABLET ORAL at 20:52

## 2019-08-23 RX ADMIN — OXYCODONE HYDROCHLORIDE 10 MG: 10 TABLET ORAL at 12:40

## 2019-08-23 RX ADMIN — DOCUSATE SODIUM 100 MG: 100 CAPSULE, LIQUID FILLED ORAL at 20:52

## 2019-08-23 RX ADMIN — Medication 10 ML: at 08:21

## 2019-08-23 RX ADMIN — PANTOPRAZOLE SODIUM 40 MG: 40 TABLET, DELAYED RELEASE ORAL at 06:05

## 2019-08-23 RX ADMIN — OXYCODONE HYDROCHLORIDE 10 MG: 10 TABLET ORAL at 03:46

## 2019-08-23 RX ADMIN — OXYCODONE HYDROCHLORIDE 10 MG: 10 TABLET ORAL at 08:22

## 2019-08-23 ASSESSMENT — PAIN DESCRIPTION - FREQUENCY
FREQUENCY: CONTINUOUS

## 2019-08-23 ASSESSMENT — PAIN DESCRIPTION - ONSET
ONSET: ON-GOING

## 2019-08-23 ASSESSMENT — PAIN DESCRIPTION - PAIN TYPE
TYPE: SURGICAL PAIN

## 2019-08-23 ASSESSMENT — PAIN SCALES - GENERAL
PAINLEVEL_OUTOF10: 5
PAINLEVEL_OUTOF10: 9
PAINLEVEL_OUTOF10: 10
PAINLEVEL_OUTOF10: 10
PAINLEVEL_OUTOF10: 6
PAINLEVEL_OUTOF10: 10
PAINLEVEL_OUTOF10: 7
PAINLEVEL_OUTOF10: 10
PAINLEVEL_OUTOF10: 10
PAINLEVEL_OUTOF10: 8
PAINLEVEL_OUTOF10: 10
PAINLEVEL_OUTOF10: 0
PAINLEVEL_OUTOF10: 6

## 2019-08-23 ASSESSMENT — PAIN DESCRIPTION - ORIENTATION
ORIENTATION: RIGHT

## 2019-08-23 ASSESSMENT — PAIN DESCRIPTION - LOCATION
LOCATION: LEG

## 2019-08-23 ASSESSMENT — PAIN DESCRIPTION - PROGRESSION: CLINICAL_PROGRESSION: NOT CHANGED

## 2019-08-23 ASSESSMENT — PAIN DESCRIPTION - DESCRIPTORS
DESCRIPTORS: ACHING;BURNING;DISCOMFORT;SORE
DESCRIPTORS: BURNING;CONSTANT;SORE
DESCRIPTORS: BURNING;CONSTANT
DESCRIPTORS: BURNING;CONSTANT;TENDER
DESCRIPTORS: ACHING;BURNING;SORE

## 2019-08-23 NOTE — CARE COORDINATION
8/23/19, BURKE spoke with Sophia Rich from [facility] in [place]. Patient was not accepted at facility. BURKE contacted Yvonne (Felipe Emmanuel) from [facility] on referral.  Facility has a special unit for drug usage-since patient has shown history of usage facility may be more appropriate for him. Will await acceptance on patient from [facility] then approach patient. SW to follow.

## 2019-08-23 NOTE — PROGRESS NOTES
Department of Orthopedic Surgery   Progress Note    Patient seen and examined. Pain controlled, having post op pain. No new complaints or paresthesias. Wound vac in place. Denies chest pain, SOB, abdominal pain. States his numbness is improving, only feeling pressure      VITALS:  BP (!) 106/53   Pulse 82   Temp 96.9 °F (36.1 °C) (Temporal)   Resp 16   Ht 5' 7\" (1.702 m)   Wt 187 lb 12.8 oz (85.2 kg)   SpO2 94% Comment: room air  BMI 29.41 kg/m²     GENERAL: Alert, oriented, somnolent  MUSCULOSKELETAL:   right lower extremity:  · Wound vac C/D/I, holding suction; dressing CDI  · Compartments firm but compressible.  Thigh is soft and compressible  · Brisk cap refill to toes, foot warm and perfused  · Motor not intact at this time to leg/foot again noted  · Pressure sensation intact to toes    CBC:   Lab Results   Component Value Date    WBC 17.3 08/23/2019    HGB 8.9 08/23/2019    HCT 26.2 08/23/2019     08/23/2019       ASSESSMENT  · S/P R lower leg fasciotomy 8/11, repeat I&D 8/14  · S/P I and D, debulking 8/19  · S/P Right leg lateral fasciotomy site primary closure and skin grafting medial incision    PLAN    · NWB RLE  · Pain control pr primary team  · DVT prophylaxis- per primary team  · Monitor Labs  · Trend H&H  · Wound vac is to remain on until 8/27/19  · PT/OT  · D/C planning, SW/PT recs  · Discuss with attending

## 2019-08-24 LAB
ANION GAP SERPL CALCULATED.3IONS-SCNC: 11 MMOL/L (ref 7–16)
BASOPHILS ABSOLUTE: 0.05 E9/L (ref 0–0.2)
BASOPHILS RELATIVE PERCENT: 0.4 % (ref 0–2)
BUN BLDV-MCNC: 16 MG/DL (ref 6–20)
CALCIUM SERPL-MCNC: 7.5 MG/DL (ref 8.6–10.2)
CHLORIDE BLD-SCNC: 104 MMOL/L (ref 98–107)
CO2: 27 MMOL/L (ref 22–29)
CREAT SERPL-MCNC: 0.9 MG/DL (ref 0.7–1.2)
EOSINOPHILS ABSOLUTE: 0.09 E9/L (ref 0.05–0.5)
EOSINOPHILS RELATIVE PERCENT: 0.7 % (ref 0–6)
GFR AFRICAN AMERICAN: >60
GFR NON-AFRICAN AMERICAN: >60 ML/MIN/1.73
GLUCOSE BLD-MCNC: 106 MG/DL (ref 74–99)
HCT VFR BLD CALC: 27.3 % (ref 37–54)
HEMOGLOBIN: 8.9 G/DL (ref 12.5–16.5)
IMMATURE GRANULOCYTES #: 0.34 E9/L
IMMATURE GRANULOCYTES %: 2.6 % (ref 0–5)
LYMPHOCYTES ABSOLUTE: 2.8 E9/L (ref 1.5–4)
LYMPHOCYTES RELATIVE PERCENT: 21.1 % (ref 20–42)
MCH RBC QN AUTO: 30.7 PG (ref 26–35)
MCHC RBC AUTO-ENTMCNC: 32.6 % (ref 32–34.5)
MCV RBC AUTO: 94.1 FL (ref 80–99.9)
MONOCYTES ABSOLUTE: 0.8 E9/L (ref 0.1–0.95)
MONOCYTES RELATIVE PERCENT: 6 % (ref 2–12)
NEUTROPHILS ABSOLUTE: 9.22 E9/L (ref 1.8–7.3)
NEUTROPHILS RELATIVE PERCENT: 69.2 % (ref 43–80)
PDW BLD-RTO: 17 FL (ref 11.5–15)
PLATELET # BLD: 486 E9/L (ref 130–450)
PMV BLD AUTO: 9.3 FL (ref 7–12)
POTASSIUM REFLEX MAGNESIUM: 3.8 MMOL/L (ref 3.5–5)
RBC # BLD: 2.9 E12/L (ref 3.8–5.8)
SODIUM BLD-SCNC: 142 MMOL/L (ref 132–146)
TOTAL CK: 208 U/L (ref 20–200)
WBC # BLD: 13.3 E9/L (ref 4.5–11.5)

## 2019-08-24 PROCEDURE — 2580000003 HC RX 258: Performed by: STUDENT IN AN ORGANIZED HEALTH CARE EDUCATION/TRAINING PROGRAM

## 2019-08-24 PROCEDURE — 36415 COLL VENOUS BLD VENIPUNCTURE: CPT

## 2019-08-24 PROCEDURE — 80048 BASIC METABOLIC PNL TOTAL CA: CPT

## 2019-08-24 PROCEDURE — 85025 COMPLETE CBC W/AUTO DIFF WBC: CPT

## 2019-08-24 PROCEDURE — 1200000000 HC SEMI PRIVATE

## 2019-08-24 PROCEDURE — 6370000000 HC RX 637 (ALT 250 FOR IP): Performed by: STUDENT IN AN ORGANIZED HEALTH CARE EDUCATION/TRAINING PROGRAM

## 2019-08-24 PROCEDURE — 94640 AIRWAY INHALATION TREATMENT: CPT

## 2019-08-24 PROCEDURE — 6360000002 HC RX W HCPCS: Performed by: STUDENT IN AN ORGANIZED HEALTH CARE EDUCATION/TRAINING PROGRAM

## 2019-08-24 PROCEDURE — 82550 ASSAY OF CK (CPK): CPT

## 2019-08-24 RX ADMIN — OXYCODONE HYDROCHLORIDE 10 MG: 10 TABLET ORAL at 06:09

## 2019-08-24 RX ADMIN — FENTANYL CITRATE 25 MCG: 0.05 INJECTION, SOLUTION INTRAMUSCULAR; INTRAVENOUS at 03:01

## 2019-08-24 RX ADMIN — FENTANYL CITRATE 25 MCG: 0.05 INJECTION, SOLUTION INTRAMUSCULAR; INTRAVENOUS at 05:04

## 2019-08-24 RX ADMIN — Medication 10 ML: at 08:42

## 2019-08-24 RX ADMIN — OXYCODONE HYDROCHLORIDE 10 MG: 10 TABLET ORAL at 01:27

## 2019-08-24 RX ADMIN — DOCUSATE SODIUM 100 MG: 100 CAPSULE, LIQUID FILLED ORAL at 20:31

## 2019-08-24 RX ADMIN — IPRATROPIUM BROMIDE AND ALBUTEROL SULFATE 1 AMPULE: .5; 3 SOLUTION RESPIRATORY (INHALATION) at 13:15

## 2019-08-24 RX ADMIN — Medication 10 ML: at 03:02

## 2019-08-24 RX ADMIN — Medication 10 ML: at 20:32

## 2019-08-24 RX ADMIN — Medication 10 ML: at 05:05

## 2019-08-24 RX ADMIN — OXYCODONE HYDROCHLORIDE 10 MG: 10 TABLET ORAL at 23:45

## 2019-08-24 RX ADMIN — FENTANYL CITRATE 25 MCG: 0.05 INJECTION, SOLUTION INTRAMUSCULAR; INTRAVENOUS at 00:18

## 2019-08-24 RX ADMIN — Medication 10 ML: at 00:19

## 2019-08-24 RX ADMIN — FENTANYL CITRATE 25 MCG: 0.05 INJECTION, SOLUTION INTRAMUSCULAR; INTRAVENOUS at 20:31

## 2019-08-24 RX ADMIN — OXYCODONE HYDROCHLORIDE 10 MG: 10 TABLET ORAL at 16:45

## 2019-08-24 ASSESSMENT — PAIN SCALES - GENERAL
PAINLEVEL_OUTOF10: 10
PAINLEVEL_OUTOF10: 0
PAINLEVEL_OUTOF10: 7
PAINLEVEL_OUTOF10: 5
PAINLEVEL_OUTOF10: 8
PAINLEVEL_OUTOF10: 10
PAINLEVEL_OUTOF10: 9
PAINLEVEL_OUTOF10: 8
PAINLEVEL_OUTOF10: 5
PAINLEVEL_OUTOF10: 5
PAINLEVEL_OUTOF10: 10
PAINLEVEL_OUTOF10: 9
PAINLEVEL_OUTOF10: 9
PAINLEVEL_OUTOF10: 7

## 2019-08-24 ASSESSMENT — PAIN DESCRIPTION - PAIN TYPE
TYPE: SURGICAL PAIN

## 2019-08-24 ASSESSMENT — PAIN DESCRIPTION - ORIENTATION
ORIENTATION: RIGHT

## 2019-08-24 ASSESSMENT — PAIN DESCRIPTION - ONSET
ONSET: ON-GOING

## 2019-08-24 ASSESSMENT — PAIN DESCRIPTION - DESCRIPTORS
DESCRIPTORS: ACHING;CONSTANT;SORE;DISCOMFORT
DESCRIPTORS: ACHING;CONSTANT;DISCOMFORT;SORE
DESCRIPTORS: ACHING;CONSTANT;BURNING;SORE
DESCRIPTORS: ACHING;CONSTANT;DISCOMFORT;SORE
DESCRIPTORS: ACHING;CONSTANT;DISCOMFORT;SORE

## 2019-08-24 ASSESSMENT — PAIN - FUNCTIONAL ASSESSMENT
PAIN_FUNCTIONAL_ASSESSMENT: PREVENTS OR INTERFERES SOME ACTIVE ACTIVITIES AND ADLS
PAIN_FUNCTIONAL_ASSESSMENT: PREVENTS OR INTERFERES WITH MANY ACTIVE NOT PASSIVE ACTIVITIES

## 2019-08-24 ASSESSMENT — PAIN DESCRIPTION - FREQUENCY
FREQUENCY: CONTINUOUS

## 2019-08-24 ASSESSMENT — PAIN DESCRIPTION - LOCATION
LOCATION: LEG
LOCATION: FOOT;LEG;HIP
LOCATION: LEG

## 2019-08-24 NOTE — PLAN OF CARE
Problem: Risk for Impaired Skin Integrity  Goal: Tissue integrity - skin and mucous membranes  Description  Structural intactness and normal physiological function of skin and  mucous membranes.   Outcome: Ongoing     Problem: Falls - Risk of:  Goal: Will remain free from falls  Description  Will remain free from falls  Outcome: Met This Shift     Problem: Falls - Risk of:  Goal: Absence of physical injury  Description  Absence of physical injury  Outcome: Met This Shift     Problem: Pain:  Goal: Pain level will decrease  Description  Pain level will decrease  Outcome: Ongoing

## 2019-08-24 NOTE — PLAN OF CARE
Problem: Risk for Impaired Skin Integrity  Goal: Tissue integrity - skin and mucous membranes  Description  Structural intactness and normal physiological function of skin and  mucous membranes.   8/24/2019 0944 by Freddy Duggan RN  Outcome: Met This Shift  8/24/2019 0943 by Freddy Duggan RN  Outcome: Met This Shift  8/23/2019 2008 by Deb Benitez RN  Outcome: Ongoing     Problem: Falls - Risk of:  Goal: Will remain free from falls  Description  Will remain free from falls  8/24/2019 0944 by Freddy Duggan RN  Outcome: Met This Shift  8/24/2019 0943 by Freddy Duggan RN  Outcome: Met This Shift  8/23/2019 2008 by Deb Benitez RN  Outcome: Met This Shift  Goal: Absence of physical injury  Description  Absence of physical injury  8/24/2019 0943 by Freddy Duggan RN  Outcome: Met This Shift  8/23/2019 2008 by Deb Benitez RN  Outcome: Met This Shift

## 2019-08-24 NOTE — PROGRESS NOTES
Department of Internal Medicine  Infectious Diseases   Progress Note      C /C: S/P Fasciotomy     Pt is awake and alert  Denies fever and chills  Right leg pain     Afebrile       Current Facility-Administered Medications   Medication Dose Route Frequency Provider Last Rate Last Dose    0.9 % sodium chloride bolus  250 mL Intravenous Once Yvonnie [place], DO        sodium chloride flush 0.9 % injection 10 mL  10 mL Intravenous 2 times per day Stephen Talley, DO   10 mL at 08/24/19 0842    sodium chloride flush 0.9 % injection 10 mL  10 mL Intravenous PRN Stephen Talley, DO   10 mL at 08/24/19 0505    docusate sodium (COLACE) capsule 100 mg  100 mg Oral BID Stephen Talley, DO   100 mg at 08/23/19 2052    HYDROmorphone (DILAUDID) injection 0.5 mg  0.5 mg Intravenous Q5 Min PRN Trish Kan MD   0.5 mg at 08/22/19 1205    pantoprazole (PROTONIX) tablet 40 mg  40 mg Oral QAM AC Jeni Lao MD   Stopped at 08/24/19 0706    calcium carbonate (TUMS) chewable tablet 500 mg  500 mg Oral TID PRN Yvonnie [place], DO   500 mg at 08/17/19 2015    lidocaine 1 % injection 5 mL  5 mL Intradermal Once Yvonnie [place], DO        acetaminophen (TYLENOL) tablet 650 mg  650 mg Oral Q4H PRN Yvonnie [place], DO   650 mg at 08/20/19 1427    oxyCODONE (ROXICODONE) immediate release tablet 5 mg  5 mg Oral Q4H PRN Yvonnie [place], DO   5 mg at 08/21/19 8684    Or    oxyCODONE (ROXICODONE) immediate release tablet 10 mg  10 mg Oral Q4H PRN Yvonnie [place], DO   10 mg at 08/24/19 2988    ipratropium-albuterol (DUONEB) nebulizer solution 1 ampule  1 ampule Inhalation Q4H WA Yvonnie [place], DO   Stopped at 08/22/19 1101    enoxaparin (LOVENOX) injection 40 mg  40 mg Subcutaneous Daily Yvonnie [place], DO   40 mg at 08/21/19 0907    fentaNYL (SUBLIMAZE) injection 25 mcg  25 mcg Intravenous Q2H PRN Sharmaine [place], DO   25 mcg at 08/24/19 0504    ondansetron (ZOFRAN) injection 4 mg  4 mg Intravenous Q6H PRN Chris Chow DO        magnesium hydroxide (MILK OF MAGNESIA) 400 MG/5ML suspension 30 mL  30 mL Oral Daily PRN Chris Chow DO             REVIEW OF SYSTEMS:      CONSTITUTIONAL:  Denies fever     HEENT: denies blurring of vision or double vision, denies hearing problem  RESPIRATORY: Denies SOB    CARDIOVASCULAR:  Denies palpitation  GASTROINTESTINAL:  Denies abdomen pain, diarrhea or constipation. GENITOURINARY:  Denies burning urination or frequency of urination  INTEGUMENT: denies wound , rash  HEMATOLOGIC/LYMPHATIC:  Denies lymph node swelling, gum bleeding or easy bruising. MUSCULOSKELETAL: Right leg pain    NEUROLOGICAL:  Awake and alert       PHYSICAL EXAM:      Vitals:     /60   Pulse 87   Temp 98.7 °F (37.1 °C) (Temporal)   Resp 16   Ht 5' 7\" (1.702 m)   Wt 187 lb 12.8 oz (85.2 kg)   SpO2 90%   BMI 29.41 kg/m²       General Appearance:    Awake, alert, no distress    Head:    Normocephalic, atraumatic   Eyes:    No pallor, no icterus,   Ears:    No obvious deformity or drainage. Nose:   No nasal drainage   Throat:   Mucosa moist, no oral thrush   Neck:   Supple, no lymphadenopathy   Back:     no CVA tenderness   Lungs:     Scattered rhonchi    Heart:    Regular rate and rhythm, no murmur, rub or gallop   Abdomen:     Soft, non-tender, bowel sounds present    Extremities:    Right leg wound VAC .    Pulses:   Dorsalis pedis palpable    Skin:   Wound right leg, blisters      CBC with Differential:      Lab Results   Component Value Date    WBC 13.3 08/24/2019    RBC 2.90 08/24/2019    HGB 8.9 08/24/2019    HCT 27.3 08/24/2019     08/24/2019    MCV 94.1 08/24/2019    MCH 30.7 08/24/2019    MCHC 32.6 08/24/2019    RDW 17.0 08/24/2019    SEGSPCT 71 10/20/2012    BANDSPCT 11 09/04/2014    METASPCT 0.9 08/21/2019    LYMPHOPCT 21.1 08/24/2019    MONOPCT 6.0 08/24/2019    MYELOPCT 1.8 08/22/2019    EOSPCT 3 10/05/2010    BASOPCT 0.4 08/24/2019    MONOSABS 0.80 08/24/2019

## 2019-08-25 LAB
ANION GAP SERPL CALCULATED.3IONS-SCNC: 6 MMOL/L (ref 7–16)
BASOPHILS ABSOLUTE: 0.04 E9/L (ref 0–0.2)
BASOPHILS RELATIVE PERCENT: 0.3 % (ref 0–2)
BLOOD BANK DISPENSE STATUS: NORMAL
BLOOD BANK PRODUCT CODE: NORMAL
BPU ID: NORMAL
BUN BLDV-MCNC: 13 MG/DL (ref 6–20)
CALCIUM SERPL-MCNC: 7.8 MG/DL (ref 8.6–10.2)
CHLORIDE BLD-SCNC: 107 MMOL/L (ref 98–107)
CO2: 29 MMOL/L (ref 22–29)
CREAT SERPL-MCNC: 0.9 MG/DL (ref 0.7–1.2)
DESCRIPTION BLOOD BANK: NORMAL
EOSINOPHILS ABSOLUTE: 0.1 E9/L (ref 0.05–0.5)
EOSINOPHILS RELATIVE PERCENT: 0.8 % (ref 0–6)
GFR AFRICAN AMERICAN: >60
GFR NON-AFRICAN AMERICAN: >60 ML/MIN/1.73
GLUCOSE BLD-MCNC: 97 MG/DL (ref 74–99)
HCT VFR BLD CALC: 29.4 % (ref 37–54)
HEMOGLOBIN: 9.5 G/DL (ref 12.5–16.5)
IMMATURE GRANULOCYTES #: 0.29 E9/L
IMMATURE GRANULOCYTES %: 2.3 % (ref 0–5)
LYMPHOCYTES ABSOLUTE: 2.31 E9/L (ref 1.5–4)
LYMPHOCYTES RELATIVE PERCENT: 18.2 % (ref 20–42)
MCH RBC QN AUTO: 30.9 PG (ref 26–35)
MCHC RBC AUTO-ENTMCNC: 32.3 % (ref 32–34.5)
MCV RBC AUTO: 95.8 FL (ref 80–99.9)
MONOCYTES ABSOLUTE: 0.84 E9/L (ref 0.1–0.95)
MONOCYTES RELATIVE PERCENT: 6.6 % (ref 2–12)
NEUTROPHILS ABSOLUTE: 9.11 E9/L (ref 1.8–7.3)
NEUTROPHILS RELATIVE PERCENT: 71.8 % (ref 43–80)
PDW BLD-RTO: 16.6 FL (ref 11.5–15)
PLATELET # BLD: 517 E9/L (ref 130–450)
PMV BLD AUTO: 9.2 FL (ref 7–12)
POTASSIUM REFLEX MAGNESIUM: 4.6 MMOL/L (ref 3.5–5)
RBC # BLD: 3.07 E12/L (ref 3.8–5.8)
SODIUM BLD-SCNC: 142 MMOL/L (ref 132–146)
TOTAL CK: 165 U/L (ref 20–200)
WBC # BLD: 12.7 E9/L (ref 4.5–11.5)

## 2019-08-25 PROCEDURE — 36415 COLL VENOUS BLD VENIPUNCTURE: CPT

## 2019-08-25 PROCEDURE — 6360000002 HC RX W HCPCS: Performed by: STUDENT IN AN ORGANIZED HEALTH CARE EDUCATION/TRAINING PROGRAM

## 2019-08-25 PROCEDURE — 97530 THERAPEUTIC ACTIVITIES: CPT

## 2019-08-25 PROCEDURE — 82550 ASSAY OF CK (CPK): CPT

## 2019-08-25 PROCEDURE — 6370000000 HC RX 637 (ALT 250 FOR IP): Performed by: STUDENT IN AN ORGANIZED HEALTH CARE EDUCATION/TRAINING PROGRAM

## 2019-08-25 PROCEDURE — 6370000000 HC RX 637 (ALT 250 FOR IP): Performed by: INTERNAL MEDICINE

## 2019-08-25 PROCEDURE — 85025 COMPLETE CBC W/AUTO DIFF WBC: CPT

## 2019-08-25 PROCEDURE — 1200000000 HC SEMI PRIVATE

## 2019-08-25 PROCEDURE — 80048 BASIC METABOLIC PNL TOTAL CA: CPT

## 2019-08-25 PROCEDURE — 2580000003 HC RX 258: Performed by: STUDENT IN AN ORGANIZED HEALTH CARE EDUCATION/TRAINING PROGRAM

## 2019-08-25 RX ADMIN — DOCUSATE SODIUM 100 MG: 100 CAPSULE, LIQUID FILLED ORAL at 20:23

## 2019-08-25 RX ADMIN — OXYCODONE HYDROCHLORIDE 10 MG: 10 TABLET ORAL at 04:54

## 2019-08-25 RX ADMIN — OXYCODONE HYDROCHLORIDE 10 MG: 10 TABLET ORAL at 17:53

## 2019-08-25 RX ADMIN — ENOXAPARIN SODIUM 40 MG: 40 INJECTION SUBCUTANEOUS at 08:22

## 2019-08-25 RX ADMIN — PANTOPRAZOLE SODIUM 40 MG: 40 TABLET, DELAYED RELEASE ORAL at 06:53

## 2019-08-25 RX ADMIN — OXYCODONE HYDROCHLORIDE 10 MG: 10 TABLET ORAL at 22:09

## 2019-08-25 RX ADMIN — OXYCODONE HYDROCHLORIDE 10 MG: 10 TABLET ORAL at 09:23

## 2019-08-25 RX ADMIN — DOCUSATE SODIUM 100 MG: 100 CAPSULE, LIQUID FILLED ORAL at 08:23

## 2019-08-25 RX ADMIN — Medication 10 ML: at 08:23

## 2019-08-25 RX ADMIN — FENTANYL CITRATE 25 MCG: 0.05 INJECTION, SOLUTION INTRAMUSCULAR; INTRAVENOUS at 20:24

## 2019-08-25 RX ADMIN — Medication 10 ML: at 20:24

## 2019-08-25 RX ADMIN — OXYCODONE HYDROCHLORIDE 10 MG: 10 TABLET ORAL at 13:40

## 2019-08-25 RX ADMIN — Medication 10 ML: at 22:09

## 2019-08-25 ASSESSMENT — PAIN DESCRIPTION - ONSET: ONSET: ON-GOING

## 2019-08-25 ASSESSMENT — PAIN SCALES - GENERAL
PAINLEVEL_OUTOF10: 8
PAINLEVEL_OUTOF10: 7
PAINLEVEL_OUTOF10: 8
PAINLEVEL_OUTOF10: 9
PAINLEVEL_OUTOF10: 8
PAINLEVEL_OUTOF10: 10
PAINLEVEL_OUTOF10: 9
PAINLEVEL_OUTOF10: 5
PAINLEVEL_OUTOF10: 0
PAINLEVEL_OUTOF10: 10
PAINLEVEL_OUTOF10: 0

## 2019-08-25 ASSESSMENT — PAIN DESCRIPTION - ORIENTATION
ORIENTATION: RIGHT

## 2019-08-25 ASSESSMENT — PAIN DESCRIPTION - PAIN TYPE
TYPE: ACUTE PAIN;SURGICAL PAIN
TYPE: SURGICAL PAIN;ACUTE PAIN
TYPE: ACUTE PAIN;SURGICAL PAIN

## 2019-08-25 ASSESSMENT — PAIN DESCRIPTION - FREQUENCY
FREQUENCY: CONTINUOUS

## 2019-08-25 ASSESSMENT — PAIN DESCRIPTION - DESCRIPTORS: DESCRIPTORS: CONSTANT;ACHING;SHOOTING

## 2019-08-25 ASSESSMENT — PAIN DESCRIPTION - LOCATION
LOCATION: LEG
LOCATION: FOOT;LEG
LOCATION: BACK;BUTTOCKS;LEG

## 2019-08-25 ASSESSMENT — PAIN DESCRIPTION - PROGRESSION: CLINICAL_PROGRESSION: GRADUALLY IMPROVING

## 2019-08-25 NOTE — PROGRESS NOTES
Department of Orthopedic Surgery   Progress Note    Patient seen and examined. Pain controlled, having post op pain. No new complaints or paresthesias. Wound vac in place and holding. Denies chest pain, SOB, abdominal pain, dizziness. States his numbness is improving, can feel touch and pressure      VITALS:  /64   Pulse 90   Temp 98.2 °F (36.8 °C) (Temporal)   Resp 16   Ht 5' 7\" (1.702 m)   Wt 187 lb 12.8 oz (85.2 kg)   SpO2 94%   BMI 29.41 kg/m²     GENERAL: Alert, oriented, somnolent  MUSCULOSKELETAL:   right lower extremity:  · Wound vac C/D/I, holding suction; dressing CDI  · Compartments firm but compressible.  Thigh is soft and compressible  · Brisk cap refill to toes, foot warm and perfused  · Motor not intact at this time to leg/foot again noted  · Pressure sensation intact to toes    CBC:   Lab Results   Component Value Date    WBC 12.7 08/25/2019    HGB 9.5 08/25/2019    HCT 29.4 08/25/2019     08/25/2019       ASSESSMENT  · S/P R lower leg fasciotomy 8/11, repeat I&D 8/14  · S/P I and D, debulking 8/19  · S/P Right leg lateral fasciotomy site primary closure and skin grafting medial incision    PLAN    · NWB RLE  · Pain control pr primary team  · DVT prophylaxis- per primary team  · Monitor Labs  · Hemoglobin improving, 9.5 this am  · Wound vac removal on 8/27/19  · PT/OT  · D/C planning, SW/PT recs  · Discuss with attending

## 2019-08-25 NOTE — PROGRESS NOTES
08/25/2019    MONOSABS 0.84 08/25/2019    LYMPHSABS 2.31 08/25/2019    EOSABS 0.10 08/25/2019    BASOSABS 0.04 08/25/2019       CMP     Lab Results   Component Value Date     08/25/2019    K 4.6 08/25/2019     08/25/2019    CO2 29 08/25/2019    BUN 13 08/25/2019    CREATININE 0.9 08/25/2019    GFRAA >60 08/25/2019    LABGLOM >60 08/25/2019    GLUCOSE 97 08/25/2019    GLUCOSE 95 07/08/2011    PROT 5.0 08/15/2019    LABALBU 2.1 08/15/2019    LABALBU 3.6 07/08/2011    CALCIUM 7.8 08/25/2019    BILITOT 0.6 08/15/2019    ALKPHOS 41 08/15/2019     08/15/2019     08/15/2019       MICROBIOLOGY:    Blood culture - CONS       Radiology :    Chest X ray    1. Bibasilar pleural-parenchymal disease. 2. Underlying central pulmonary vascular congestion with thoracic  aortic vascular calcifications. ASSESSMENT    1. Polysubstance abuse / Hep C infection ( HIV neg )   2. Hx of MRSA bacteremia and vertebral osteomyelitis   3. Rhabdomyolysis/ compartment syndrome ( right leg )  s/p fasciotomy   4. Aspiration pneumonia - treated   5. Leukocytosis - reactive         PLAN    1. Local wound care   2. Hep C treatment out pt basis   3.   No need for abx at this time

## 2019-08-26 LAB
ANION GAP SERPL CALCULATED.3IONS-SCNC: 8 MMOL/L (ref 7–16)
BASOPHILS ABSOLUTE: 0.04 E9/L (ref 0–0.2)
BASOPHILS RELATIVE PERCENT: 0.4 % (ref 0–2)
BUN BLDV-MCNC: 17 MG/DL (ref 6–20)
CALCIUM SERPL-MCNC: 7.9 MG/DL (ref 8.6–10.2)
CHLORIDE BLD-SCNC: 102 MMOL/L (ref 98–107)
CO2: 28 MMOL/L (ref 22–29)
CREAT SERPL-MCNC: 0.9 MG/DL (ref 0.7–1.2)
EOSINOPHILS ABSOLUTE: 0.16 E9/L (ref 0.05–0.5)
EOSINOPHILS RELATIVE PERCENT: 1.4 % (ref 0–6)
GFR AFRICAN AMERICAN: >60
GFR NON-AFRICAN AMERICAN: >60 ML/MIN/1.73
GLUCOSE BLD-MCNC: 96 MG/DL (ref 74–99)
HCT VFR BLD CALC: 29.2 % (ref 37–54)
HEMOGLOBIN: 9.3 G/DL (ref 12.5–16.5)
IMMATURE GRANULOCYTES #: 0.23 E9/L
IMMATURE GRANULOCYTES %: 2 % (ref 0–5)
LYMPHOCYTES ABSOLUTE: 2.56 E9/L (ref 1.5–4)
LYMPHOCYTES RELATIVE PERCENT: 22.6 % (ref 20–42)
MCH RBC QN AUTO: 30.5 PG (ref 26–35)
MCHC RBC AUTO-ENTMCNC: 31.8 % (ref 32–34.5)
MCV RBC AUTO: 95.7 FL (ref 80–99.9)
MONOCYTES ABSOLUTE: 0.7 E9/L (ref 0.1–0.95)
MONOCYTES RELATIVE PERCENT: 6.2 % (ref 2–12)
NEUTROPHILS ABSOLUTE: 7.66 E9/L (ref 1.8–7.3)
NEUTROPHILS RELATIVE PERCENT: 67.4 % (ref 43–80)
PDW BLD-RTO: 15.7 FL (ref 11.5–15)
PLATELET # BLD: 520 E9/L (ref 130–450)
PMV BLD AUTO: 9.4 FL (ref 7–12)
POTASSIUM REFLEX MAGNESIUM: 3.9 MMOL/L (ref 3.5–5)
RBC # BLD: 3.05 E12/L (ref 3.8–5.8)
SODIUM BLD-SCNC: 138 MMOL/L (ref 132–146)
TOTAL CK: 156 U/L (ref 20–200)
WBC # BLD: 11.4 E9/L (ref 4.5–11.5)

## 2019-08-26 PROCEDURE — 6370000000 HC RX 637 (ALT 250 FOR IP): Performed by: STUDENT IN AN ORGANIZED HEALTH CARE EDUCATION/TRAINING PROGRAM

## 2019-08-26 PROCEDURE — 6360000002 HC RX W HCPCS: Performed by: STUDENT IN AN ORGANIZED HEALTH CARE EDUCATION/TRAINING PROGRAM

## 2019-08-26 PROCEDURE — 6370000000 HC RX 637 (ALT 250 FOR IP): Performed by: INTERNAL MEDICINE

## 2019-08-26 PROCEDURE — 82550 ASSAY OF CK (CPK): CPT

## 2019-08-26 PROCEDURE — 85025 COMPLETE CBC W/AUTO DIFF WBC: CPT

## 2019-08-26 PROCEDURE — 1200000000 HC SEMI PRIVATE

## 2019-08-26 PROCEDURE — 94640 AIRWAY INHALATION TREATMENT: CPT

## 2019-08-26 PROCEDURE — 2580000003 HC RX 258: Performed by: STUDENT IN AN ORGANIZED HEALTH CARE EDUCATION/TRAINING PROGRAM

## 2019-08-26 PROCEDURE — 80048 BASIC METABOLIC PNL TOTAL CA: CPT

## 2019-08-26 PROCEDURE — 36415 COLL VENOUS BLD VENIPUNCTURE: CPT

## 2019-08-26 RX ADMIN — FENTANYL CITRATE 25 MCG: 0.05 INJECTION, SOLUTION INTRAMUSCULAR; INTRAVENOUS at 18:28

## 2019-08-26 RX ADMIN — ACETAMINOPHEN 650 MG: 325 TABLET, FILM COATED ORAL at 00:50

## 2019-08-26 RX ADMIN — OXYCODONE HYDROCHLORIDE 10 MG: 10 TABLET ORAL at 10:27

## 2019-08-26 RX ADMIN — DOCUSATE SODIUM 100 MG: 100 CAPSULE, LIQUID FILLED ORAL at 10:27

## 2019-08-26 RX ADMIN — OXYCODONE HYDROCHLORIDE 10 MG: 10 TABLET ORAL at 15:38

## 2019-08-26 RX ADMIN — Medication 10 ML: at 20:48

## 2019-08-26 RX ADMIN — OXYCODONE HYDROCHLORIDE 10 MG: 10 TABLET ORAL at 20:48

## 2019-08-26 RX ADMIN — ENOXAPARIN SODIUM 40 MG: 40 INJECTION SUBCUTANEOUS at 10:28

## 2019-08-26 RX ADMIN — FENTANYL CITRATE 25 MCG: 0.05 INJECTION, SOLUTION INTRAMUSCULAR; INTRAVENOUS at 23:50

## 2019-08-26 RX ADMIN — Medication 10 ML: at 23:51

## 2019-08-26 RX ADMIN — DOCUSATE SODIUM 100 MG: 100 CAPSULE, LIQUID FILLED ORAL at 20:48

## 2019-08-26 RX ADMIN — PANTOPRAZOLE SODIUM 40 MG: 40 TABLET, DELAYED RELEASE ORAL at 06:52

## 2019-08-26 RX ADMIN — Medication 10 ML: at 10:28

## 2019-08-26 RX ADMIN — IPRATROPIUM BROMIDE AND ALBUTEROL SULFATE 1 AMPULE: .5; 3 SOLUTION RESPIRATORY (INHALATION) at 05:56

## 2019-08-26 ASSESSMENT — PAIN DESCRIPTION - FREQUENCY
FREQUENCY: CONTINUOUS

## 2019-08-26 ASSESSMENT — PAIN DESCRIPTION - ORIENTATION
ORIENTATION: RIGHT

## 2019-08-26 ASSESSMENT — PAIN SCALES - GENERAL
PAINLEVEL_OUTOF10: 9
PAINLEVEL_OUTOF10: 8
PAINLEVEL_OUTOF10: 8
PAINLEVEL_OUTOF10: 5
PAINLEVEL_OUTOF10: 7
PAINLEVEL_OUTOF10: 8
PAINLEVEL_OUTOF10: 3
PAINLEVEL_OUTOF10: 4
PAINLEVEL_OUTOF10: 4
PAINLEVEL_OUTOF10: 10
PAINLEVEL_OUTOF10: 9

## 2019-08-26 ASSESSMENT — PAIN DESCRIPTION - DESCRIPTORS
DESCRIPTORS: ACHING;CONSTANT;SHARP;SHOOTING
DESCRIPTORS: CONSTANT;ACHING;SHOOTING
DESCRIPTORS: ACHING;CONSTANT;DISCOMFORT;SHARP;SORE
DESCRIPTORS: ACHING;CONSTANT;SHARP;SORE
DESCRIPTORS: CONSTANT;ACHING;SHOOTING
DESCRIPTORS: CONSTANT;ACHING;SHOOTING

## 2019-08-26 ASSESSMENT — PAIN DESCRIPTION - PROGRESSION
CLINICAL_PROGRESSION: GRADUALLY IMPROVING

## 2019-08-26 ASSESSMENT — PAIN DESCRIPTION - PAIN TYPE
TYPE: ACUTE PAIN;SURGICAL PAIN

## 2019-08-26 ASSESSMENT — PAIN DESCRIPTION - ONSET
ONSET: ON-GOING

## 2019-08-26 ASSESSMENT — PAIN DESCRIPTION - LOCATION
LOCATION: LEG

## 2019-08-26 ASSESSMENT — PAIN - FUNCTIONAL ASSESSMENT
PAIN_FUNCTIONAL_ASSESSMENT: PREVENTS OR INTERFERES SOME ACTIVE ACTIVITIES AND ADLS

## 2019-08-26 NOTE — PROGRESS NOTES
08/26/2019    MONOSABS 0.70 08/26/2019    LYMPHSABS 2.56 08/26/2019    EOSABS 0.16 08/26/2019    BASOSABS 0.04 08/26/2019       CMP     Lab Results   Component Value Date     08/26/2019    K 3.9 08/26/2019     08/26/2019    CO2 28 08/26/2019    BUN 17 08/26/2019    CREATININE 0.9 08/26/2019    GFRAA >60 08/26/2019    LABGLOM >60 08/26/2019    GLUCOSE 96 08/26/2019    GLUCOSE 95 07/08/2011    PROT 5.0 08/15/2019    LABALBU 2.1 08/15/2019    LABALBU 3.6 07/08/2011    CALCIUM 7.9 08/26/2019    BILITOT 0.6 08/15/2019    ALKPHOS 41 08/15/2019     08/15/2019     08/15/2019       MICROBIOLOGY:    Blood culture - CONS       Radiology :    Chest X ray    1. Bibasilar pleural-parenchymal disease. 2. Underlying central pulmonary vascular congestion with thoracic  aortic vascular calcifications. ASSESSMENT    1. Hep C infection ( HIV neg )   2. Hx of MRSA bacteremia and vertebral osteomyelitis   3. Rhabdomyolysis/ compartment syndrome ( right leg )  s/p fasciotomy   4. Aspiration pneumonia - treated   5. Leukocytosis - reactive -improved         PLAN    1. Local wound care   2. Hep C treatment out pt basis   3.  ID follow up in 1-2 weeks

## 2019-08-26 NOTE — PROGRESS NOTES
Hospitalist Progress Note      PCP: Yaima Jung DO    Date of Admission: 8/11/2019    Chief Complaint:  Drug OD    Hospital Course: *found to be pos for benzo's , cocaine and methadone, concern for compartment syndrome, had to have a fasciotomy on RLE.   went back to OR on yesterday  Had a Debridement, right leg muscle and fascia, 182 sq. cm. Including medial and lateral wounds. Application of wound VAC, right leg, for OR on August 19. ** **  No awaiting placement    Subjective: ** no complaints      Medications:  Reviewed    Infusion Medications   Scheduled Medications    sodium chloride  250 mL Intravenous Once    sodium chloride flush  10 mL Intravenous 2 times per day    docusate sodium  100 mg Oral BID    pantoprazole  40 mg Oral QAM AC    lidocaine  5 mL Intradermal Once    ipratropium-albuterol  1 ampule Inhalation Q4H WA    enoxaparin  40 mg Subcutaneous Daily     PRN Meds: sodium chloride flush, HYDROmorphone, calcium carbonate, acetaminophen, oxyCODONE **OR** oxyCODONE, fentanNYL, ondansetron, magnesium hydroxide      Intake/Output Summary (Last 24 hours) at 8/26/2019 1314  Last data filed at 8/26/2019 1041  Gross per 24 hour   Intake --   Output 1200 ml   Net -1200 ml       Exam:    BP (!) 96/52   Pulse 94   Temp 96.9 °F (36.1 °C) (Temporal)   Resp 16   Ht 5' 7\" (1.702 m)   Wt 191 lb (86.6 kg)   SpO2 96%   BMI 29.91 kg/m²       Gen: * well developed  HEENT: NC/AT, moist mucous membranes,   Neck: supple, trachea midline,   Heart:  Normal s1/s2, RRR, no murmurs, gallops, or rubs.    Lungs:  cta * bilaterally,   Abd: bowel sounds present, soft, nontender, nondistended, no masses  Extrem:  No clubbing, cyanosis,   Pos edema, RLE, dsd and * edema  Skin: no rashes or lesions  Psych: A & O x3  Neuro: grossly intact, moves all four extremities.    Capillary Refill: Brisk,< 3 seconds   Peripheral Pulses: +2 palpable, equal bilaterally                   Labs:   Recent Labs

## 2019-08-27 LAB
ANION GAP SERPL CALCULATED.3IONS-SCNC: 10 MMOL/L (ref 7–16)
ANION GAP SERPL CALCULATED.3IONS-SCNC: 11 MMOL/L (ref 7–16)
BUN BLDV-MCNC: 21 MG/DL (ref 6–20)
BUN BLDV-MCNC: 22 MG/DL (ref 6–20)
CALCIUM SERPL-MCNC: 8.1 MG/DL (ref 8.6–10.2)
CALCIUM SERPL-MCNC: 8.1 MG/DL (ref 8.6–10.2)
CHLORIDE BLD-SCNC: 103 MMOL/L (ref 98–107)
CHLORIDE BLD-SCNC: 105 MMOL/L (ref 98–107)
CO2: 24 MMOL/L (ref 22–29)
CO2: 26 MMOL/L (ref 22–29)
CREAT SERPL-MCNC: 0.8 MG/DL (ref 0.7–1.2)
CREAT SERPL-MCNC: 0.9 MG/DL (ref 0.7–1.2)
GFR AFRICAN AMERICAN: >60
GFR AFRICAN AMERICAN: >60
GFR NON-AFRICAN AMERICAN: >60 ML/MIN/1.73
GFR NON-AFRICAN AMERICAN: >60 ML/MIN/1.73
GLUCOSE BLD-MCNC: 93 MG/DL (ref 74–99)
GLUCOSE BLD-MCNC: 96 MG/DL (ref 74–99)
HCT VFR BLD CALC: 30.3 % (ref 37–54)
HEMOGLOBIN: 9.5 G/DL (ref 12.5–16.5)
MCH RBC QN AUTO: 30.4 PG (ref 26–35)
MCHC RBC AUTO-ENTMCNC: 31.4 % (ref 32–34.5)
MCV RBC AUTO: 97.1 FL (ref 80–99.9)
PDW BLD-RTO: 15.4 FL (ref 11.5–15)
PLATELET # BLD: 474 E9/L (ref 130–450)
PMV BLD AUTO: 9.7 FL (ref 7–12)
POTASSIUM REFLEX MAGNESIUM: 4.1 MMOL/L (ref 3.5–5)
POTASSIUM REFLEX MAGNESIUM: 4.4 MMOL/L (ref 3.5–5)
RBC # BLD: 3.12 E12/L (ref 3.8–5.8)
REASON FOR REJECTION: NORMAL
REJECTED TEST: NORMAL
SODIUM BLD-SCNC: 139 MMOL/L (ref 132–146)
SODIUM BLD-SCNC: 140 MMOL/L (ref 132–146)
TOTAL CK: 128 U/L (ref 20–200)
TOTAL CK: 137 U/L (ref 20–200)
WBC # BLD: 10.1 E9/L (ref 4.5–11.5)

## 2019-08-27 PROCEDURE — 1200000000 HC SEMI PRIVATE

## 2019-08-27 PROCEDURE — 2580000003 HC RX 258: Performed by: STUDENT IN AN ORGANIZED HEALTH CARE EDUCATION/TRAINING PROGRAM

## 2019-08-27 PROCEDURE — 6360000002 HC RX W HCPCS: Performed by: STUDENT IN AN ORGANIZED HEALTH CARE EDUCATION/TRAINING PROGRAM

## 2019-08-27 PROCEDURE — 6370000000 HC RX 637 (ALT 250 FOR IP): Performed by: STUDENT IN AN ORGANIZED HEALTH CARE EDUCATION/TRAINING PROGRAM

## 2019-08-27 PROCEDURE — 36415 COLL VENOUS BLD VENIPUNCTURE: CPT

## 2019-08-27 PROCEDURE — 85027 COMPLETE CBC AUTOMATED: CPT

## 2019-08-27 PROCEDURE — 2580000003 HC RX 258: Performed by: INTERNAL MEDICINE

## 2019-08-27 PROCEDURE — 6370000000 HC RX 637 (ALT 250 FOR IP): Performed by: INTERNAL MEDICINE

## 2019-08-27 PROCEDURE — 82550 ASSAY OF CK (CPK): CPT

## 2019-08-27 PROCEDURE — 80048 BASIC METABOLIC PNL TOTAL CA: CPT

## 2019-08-27 RX ORDER — 0.9 % SODIUM CHLORIDE 0.9 %
250 INTRAVENOUS SOLUTION INTRAVENOUS ONCE
Status: COMPLETED | OUTPATIENT
Start: 2019-08-27 | End: 2019-08-27

## 2019-08-27 RX ADMIN — Medication 10 ML: at 08:54

## 2019-08-27 RX ADMIN — FENTANYL CITRATE 25 MCG: 0.05 INJECTION, SOLUTION INTRAMUSCULAR; INTRAVENOUS at 03:25

## 2019-08-27 RX ADMIN — Medication 10 ML: at 21:41

## 2019-08-27 RX ADMIN — Medication 10 ML: at 03:26

## 2019-08-27 RX ADMIN — OXYCODONE HYDROCHLORIDE 10 MG: 10 TABLET ORAL at 21:40

## 2019-08-27 RX ADMIN — DOCUSATE SODIUM 100 MG: 100 CAPSULE, LIQUID FILLED ORAL at 21:40

## 2019-08-27 RX ADMIN — OXYCODONE HYDROCHLORIDE 10 MG: 10 TABLET ORAL at 01:00

## 2019-08-27 RX ADMIN — Medication 10 ML: at 07:29

## 2019-08-27 RX ADMIN — OXYCODONE HYDROCHLORIDE 10 MG: 10 TABLET ORAL at 05:44

## 2019-08-27 RX ADMIN — FENTANYL CITRATE 25 MCG: 0.05 INJECTION, SOLUTION INTRAMUSCULAR; INTRAVENOUS at 07:29

## 2019-08-27 RX ADMIN — SODIUM CHLORIDE 250 ML: 9 INJECTION, SOLUTION INTRAVENOUS at 10:41

## 2019-08-27 RX ADMIN — ENOXAPARIN SODIUM 40 MG: 40 INJECTION SUBCUTANEOUS at 08:53

## 2019-08-27 RX ADMIN — OXYCODONE HYDROCHLORIDE 10 MG: 10 TABLET ORAL at 18:39

## 2019-08-27 RX ADMIN — PANTOPRAZOLE SODIUM 40 MG: 40 TABLET, DELAYED RELEASE ORAL at 05:45

## 2019-08-27 RX ADMIN — DOCUSATE SODIUM 100 MG: 100 CAPSULE, LIQUID FILLED ORAL at 08:53

## 2019-08-27 RX ADMIN — ACETAMINOPHEN 650 MG: 325 TABLET, FILM COATED ORAL at 11:58

## 2019-08-27 ASSESSMENT — PAIN DESCRIPTION - FREQUENCY
FREQUENCY: CONTINUOUS

## 2019-08-27 ASSESSMENT — PAIN DESCRIPTION - ONSET
ONSET: ON-GOING
ONSET: AWAKENED FROM SLEEP
ONSET: ON-GOING
ONSET: ON-GOING

## 2019-08-27 ASSESSMENT — PAIN DESCRIPTION - PROGRESSION
CLINICAL_PROGRESSION: GRADUALLY IMPROVING
CLINICAL_PROGRESSION: GRADUALLY IMPROVING

## 2019-08-27 ASSESSMENT — PAIN SCALES - GENERAL
PAINLEVEL_OUTOF10: 10
PAINLEVEL_OUTOF10: 10
PAINLEVEL_OUTOF10: 7
PAINLEVEL_OUTOF10: 6
PAINLEVEL_OUTOF10: 6
PAINLEVEL_OUTOF10: 10
PAINLEVEL_OUTOF10: 7
PAINLEVEL_OUTOF10: 9
PAINLEVEL_OUTOF10: 6
PAINLEVEL_OUTOF10: 6
PAINLEVEL_OUTOF10: 9
PAINLEVEL_OUTOF10: 7
PAINLEVEL_OUTOF10: 10

## 2019-08-27 ASSESSMENT — PAIN DESCRIPTION - ORIENTATION
ORIENTATION: RIGHT

## 2019-08-27 ASSESSMENT — PAIN DESCRIPTION - PAIN TYPE
TYPE: ACUTE PAIN;SURGICAL PAIN
TYPE: ACUTE PAIN;SURGICAL PAIN
TYPE: SURGICAL PAIN
TYPE: ACUTE PAIN;SURGICAL PAIN
TYPE: SURGICAL PAIN
TYPE: ACUTE PAIN;SURGICAL PAIN
TYPE: ACUTE PAIN;SURGICAL PAIN
TYPE: SURGICAL PAIN

## 2019-08-27 ASSESSMENT — PAIN DESCRIPTION - LOCATION
LOCATION: LEG

## 2019-08-27 ASSESSMENT — PAIN DESCRIPTION - DESCRIPTORS
DESCRIPTORS: ACHING;CONSTANT;DISCOMFORT;SORE;SHARP
DESCRIPTORS: ACHING;CONSTANT;DISCOMFORT;SORE
DESCRIPTORS: ACHING;CONSTANT;DISCOMFORT;SORE
DESCRIPTORS: BURNING;NAGGING;DISCOMFORT
DESCRIPTORS: ACHING;BURNING;DISCOMFORT;SORE
DESCRIPTORS: BURNING;DISCOMFORT;NAGGING
DESCRIPTORS: BURNING;ACHING;DISCOMFORT;SORE
DESCRIPTORS: ACHING;CONSTANT;DISCOMFORT;SORE

## 2019-08-27 NOTE — PROGRESS NOTES
Labs:   Recent Labs     08/25/19  0542 08/26/19  0442 08/27/19  0706   WBC 12.7* 11.4 10.1   HGB 9.5* 9.3* 9.5*   HCT 29.4* 29.2* 30.3*   * 520* 474*     Recent Labs     08/26/19  0442 08/27/19  0521 08/27/19  0706    140 139   K 3.9 4.4 4.1    105 103   CO2 28 24 26   BUN 17 22* 21*   CREATININE 0.9 0.8 0.9   CALCIUM 7.9* 8.1* 8.1*     No results for input(s): AST, ALT, BILIDIR, BILITOT, ALKPHOS in the last 72 hours. No results for input(s): INR in the last 72 hours. Recent Labs     08/26/19  0442 08/27/19  0521 08/27/19  0706   CKTOTAL 156 137 128     No results for input(s): AST, ALT, ALB, BILIDIR, BILITOT, ALKPHOS in the last 72 hours. No results for input(s): LACTA in the last 72 hours.   No results found for: Braulio Gonsalez  Lab Results   Component Value Date    AMMONIA 40.0 08/12/2019    AMMONIA 137.0 (H) 08/11/2019    AMMONIA 28.0 06/14/2019       Assessment:    Active Hospital Problems    Diagnosis Date Noted    Moderate protein-calorie malnutrition ([facility] [address] [E44.0] 08/15/2019    Drug overdose, intentional, initial encounter ([facility] [address] Etresa  08/11/2019   hypotension,  poly subastance abuse(benzos, cocaine, and methadone)  Hepatitis C   compartment syndrome  Schizophrenia  Rhabdomyolysis(OR august 19, 2019)   CONS bacteremia, ? contaminate     Plan:   cont supportive care   cont augmentin   250 cc bolus of NS  Dc fentanyl and morphin  DVT Prophylaxis: *lovenox      Diet: DIET CLEAR LIQUID;  Code Status: Full Code     PT/OT Eval Status: *when stable     Dispo - [facility] or psych      Electronically signed by Cristi Valencia DO on 8/27/2019 at 1:39 PM Gina Larsen

## 2019-08-27 NOTE — PLAN OF CARE
Problem: Risk for Impaired Skin Integrity  Goal: Tissue integrity - skin and mucous membranes  Description  Structural intactness and normal physiological function of skin and  mucous membranes.   Outcome: Met This Shift     Problem: Falls - Risk of:  Goal: Will remain free from falls  Description  Will remain free from falls  Outcome: Met This Shift  Goal: Absence of physical injury  Description  Absence of physical injury  Outcome: Met This Shift     Problem: Pain:  Goal: Pain level will decrease  Description  Pain level will decrease  Outcome: Met This Shift  Goal: Control of acute pain  Description  Control of acute pain  Outcome: Met This Shift  Goal: Control of chronic pain  Description  Control of chronic pain  Outcome: Met This Shift     Problem: Skin Integrity:  Goal: Will show no infection signs and symptoms  Description  Will show no infection signs and symptoms  Outcome: Met This Shift  Goal: Absence of new skin breakdown  Description  Absence of new skin breakdown  Outcome: Met This Shift

## 2019-08-27 NOTE — CARE COORDINATION
8/27/19, BURKE spoke with [facility] from [facility]. Patient given Elyssa Schmitt to go to facility and can go when medically cleared for discharge. SW to follow.

## 2019-08-27 NOTE — CARE COORDINATION
N-17 started. HENS complete. Ambulette form, facesheet, and envelope in soft chart.   Electronically signed by VANDANA Resendez on 8/27/2019 at 3:27 PM

## 2019-08-27 NOTE — PLAN OF CARE
Problem: Malnutrition  (NI-5.2)  Goal: Food and/or Nutrient Delivery  Description: Continue NPO. Monitor nutrition progression  Individualized approach for food/nutrient provision.   Outcome: Ongoing

## 2019-08-28 VITALS
HEIGHT: 67 IN | TEMPERATURE: 98 F | BODY MASS INDEX: 28.08 KG/M2 | SYSTOLIC BLOOD PRESSURE: 107 MMHG | OXYGEN SATURATION: 95 % | RESPIRATION RATE: 20 BRPM | HEART RATE: 101 BPM | WEIGHT: 178.9 LBS | DIASTOLIC BLOOD PRESSURE: 63 MMHG

## 2019-08-28 LAB
ANION GAP SERPL CALCULATED.3IONS-SCNC: 9 MMOL/L (ref 7–16)
BASOPHILS ABSOLUTE: 0.03 E9/L (ref 0–0.2)
BASOPHILS RELATIVE PERCENT: 0.4 % (ref 0–2)
BUN BLDV-MCNC: 16 MG/DL (ref 6–20)
CALCIUM SERPL-MCNC: 8.1 MG/DL (ref 8.6–10.2)
CHLORIDE BLD-SCNC: 105 MMOL/L (ref 98–107)
CO2: 25 MMOL/L (ref 22–29)
CREAT SERPL-MCNC: 0.7 MG/DL (ref 0.7–1.2)
EOSINOPHILS ABSOLUTE: 0.08 E9/L (ref 0.05–0.5)
EOSINOPHILS RELATIVE PERCENT: 1.1 % (ref 0–6)
GFR AFRICAN AMERICAN: >60
GFR NON-AFRICAN AMERICAN: >60 ML/MIN/1.73
GLUCOSE BLD-MCNC: 88 MG/DL (ref 74–99)
HCT VFR BLD CALC: 28.2 % (ref 37–54)
HEMOGLOBIN: 9.2 G/DL (ref 12.5–16.5)
IMMATURE GRANULOCYTES #: 0.08 E9/L
IMMATURE GRANULOCYTES %: 1.1 % (ref 0–5)
LYMPHOCYTES ABSOLUTE: 1.71 E9/L (ref 1.5–4)
LYMPHOCYTES RELATIVE PERCENT: 24.2 % (ref 20–42)
MCH RBC QN AUTO: 30.8 PG (ref 26–35)
MCHC RBC AUTO-ENTMCNC: 32.6 % (ref 32–34.5)
MCV RBC AUTO: 94.3 FL (ref 80–99.9)
MONOCYTES ABSOLUTE: 0.53 E9/L (ref 0.1–0.95)
MONOCYTES RELATIVE PERCENT: 7.5 % (ref 2–12)
NEUTROPHILS ABSOLUTE: 4.64 E9/L (ref 1.8–7.3)
NEUTROPHILS RELATIVE PERCENT: 65.7 % (ref 43–80)
PDW BLD-RTO: 15 FL (ref 11.5–15)
PLATELET # BLD: 505 E9/L (ref 130–450)
PMV BLD AUTO: 9.1 FL (ref 7–12)
POTASSIUM REFLEX MAGNESIUM: 3.9 MMOL/L (ref 3.5–5)
RBC # BLD: 2.99 E12/L (ref 3.8–5.8)
SODIUM BLD-SCNC: 139 MMOL/L (ref 132–146)
TOTAL CK: 107 U/L (ref 20–200)
WBC # BLD: 7.1 E9/L (ref 4.5–11.5)

## 2019-08-28 PROCEDURE — 2580000003 HC RX 258: Performed by: STUDENT IN AN ORGANIZED HEALTH CARE EDUCATION/TRAINING PROGRAM

## 2019-08-28 PROCEDURE — 6370000000 HC RX 637 (ALT 250 FOR IP): Performed by: INTERNAL MEDICINE

## 2019-08-28 PROCEDURE — 36415 COLL VENOUS BLD VENIPUNCTURE: CPT

## 2019-08-28 PROCEDURE — 82550 ASSAY OF CK (CPK): CPT

## 2019-08-28 PROCEDURE — 6360000002 HC RX W HCPCS: Performed by: STUDENT IN AN ORGANIZED HEALTH CARE EDUCATION/TRAINING PROGRAM

## 2019-08-28 PROCEDURE — 6370000000 HC RX 637 (ALT 250 FOR IP): Performed by: STUDENT IN AN ORGANIZED HEALTH CARE EDUCATION/TRAINING PROGRAM

## 2019-08-28 PROCEDURE — 85025 COMPLETE CBC W/AUTO DIFF WBC: CPT

## 2019-08-28 PROCEDURE — 80048 BASIC METABOLIC PNL TOTAL CA: CPT

## 2019-08-28 RX ORDER — IPRATROPIUM BROMIDE AND ALBUTEROL SULFATE 2.5; .5 MG/3ML; MG/3ML
3 SOLUTION RESPIRATORY (INHALATION)
Qty: 360 ML | DISCHARGE
Start: 2019-08-28 | End: 2020-07-16 | Stop reason: ALTCHOICE

## 2019-08-28 RX ORDER — PSEUDOEPHEDRINE HCL 30 MG
100 TABLET ORAL 2 TIMES DAILY
DISCHARGE
Start: 2019-08-28

## 2019-08-28 RX ORDER — MORPHINE SULFATE 1 MG/ML
1 INJECTION, SOLUTION EPIDURAL; INTRATHECAL; INTRAVENOUS ONCE
Status: COMPLETED | OUTPATIENT
Start: 2019-08-28 | End: 2019-08-28

## 2019-08-28 RX ORDER — PANTOPRAZOLE SODIUM 40 MG/1
40 TABLET, DELAYED RELEASE ORAL
Qty: 30 TABLET | Refills: 3 | DISCHARGE
Start: 2019-08-28

## 2019-08-28 RX ORDER — CALCIUM CARBONATE 200(500)MG
500 TABLET,CHEWABLE ORAL 3 TIMES DAILY PRN
DISCHARGE
Start: 2019-08-28 | End: 2019-09-27

## 2019-08-28 RX ADMIN — ENOXAPARIN SODIUM 40 MG: 40 INJECTION SUBCUTANEOUS at 08:56

## 2019-08-28 RX ADMIN — OXYCODONE HYDROCHLORIDE 10 MG: 10 TABLET ORAL at 06:28

## 2019-08-28 RX ADMIN — MORPHINE SULFATE 1 MG: 1 INJECTION, SOLUTION EPIDURAL; INTRATHECAL; INTRAVENOUS at 12:11

## 2019-08-28 RX ADMIN — PANTOPRAZOLE SODIUM 40 MG: 40 TABLET, DELAYED RELEASE ORAL at 06:27

## 2019-08-28 RX ADMIN — OXYCODONE HYDROCHLORIDE 10 MG: 10 TABLET ORAL at 10:28

## 2019-08-28 RX ADMIN — DOCUSATE SODIUM 100 MG: 100 CAPSULE, LIQUID FILLED ORAL at 08:56

## 2019-08-28 RX ADMIN — Medication 10 ML: at 08:56

## 2019-08-28 ASSESSMENT — PAIN DESCRIPTION - ORIENTATION
ORIENTATION: RIGHT
ORIENTATION: RIGHT

## 2019-08-28 ASSESSMENT — PAIN SCALES - GENERAL
PAINLEVEL_OUTOF10: 10
PAINLEVEL_OUTOF10: 10
PAINLEVEL_OUTOF10: 8

## 2019-08-28 ASSESSMENT — PAIN DESCRIPTION - PAIN TYPE
TYPE: SURGICAL PAIN
TYPE: SURGICAL PAIN

## 2019-08-28 ASSESSMENT — PAIN DESCRIPTION - ONSET: ONSET: ON-GOING

## 2019-08-28 ASSESSMENT — PAIN DESCRIPTION - LOCATION
LOCATION: LEG
LOCATION: LEG

## 2019-08-28 ASSESSMENT — PAIN DESCRIPTION - DESCRIPTORS
DESCRIPTORS: ACHING;SORE;BURNING
DESCRIPTORS: ACHING;BURNING;DISCOMFORT;SORE

## 2019-08-28 ASSESSMENT — PAIN DESCRIPTION - FREQUENCY: FREQUENCY: CONTINUOUS

## 2019-08-28 NOTE — PROGRESS NOTES
Nurse to nurse was called to [employer] [facility] [employer] to nurse Zachary Zaidi. Faxed discharge instructions, H&P, prescriptions, and MAR to [phone].

## 2019-08-28 NOTE — PROGRESS NOTES
Hospitalist Progress Note      PCP: Santa Renteria DO    Date of Admission: 8/11/2019    Chief Complaint: * drug OD    Hospital Course: *found to be pos for benzo's , cocaine and methadone, concern for compartment syndrome, had to have a fasciotomy on RLE.   went back to OR on yesterday  Had a Debridement, right leg muscle and fascia, 182 sq. cm. Including medial and lateral wounds. Application of wound VAC, right leg now removed, for OR on August 19.   Discussed  Frequent pain med request and low blood pressure**     Subjective: ** no complaints      Medications:  Reviewed    Infusion Medications   Scheduled Medications    sodium chloride  250 mL Intravenous Once    sodium chloride flush  10 mL Intravenous 2 times per day    docusate sodium  100 mg Oral BID    pantoprazole  40 mg Oral QAM AC    lidocaine  5 mL Intradermal Once    ipratropium-albuterol  1 ampule Inhalation Q4H WA    enoxaparin  40 mg Subcutaneous Daily     PRN Meds: sodium chloride flush, calcium carbonate, acetaminophen, oxyCODONE **OR** oxyCODONE HCl, ondansetron, magnesium hydroxide      Intake/Output Summary (Last 24 hours) at 8/28/2019 1327  Last data filed at 8/28/2019 0447  Gross per 24 hour   Intake 1179 ml   Output 1300 ml   Net -121 ml       Exam:    /63   Pulse 101   Temp 98 °F (36.7 °C) (Temporal)   Resp 20   Ht 5' 7\" (1.702 m)   Wt 178 lb 14.4 oz (81.1 kg)   SpO2 95%   BMI 28.02 kg/m²         Gen: * well developed  HEENT: NC/AT, moist mucous membranes,   Neck: supple, trachea midline,   Heart:  Normal s1/s2, RRR, no murmurs, gallops, or rubs.    Lungs:  cta * bilaterally,   Abd: bowel sounds present, soft, nontender, nondistended, no masses  Extrem:  No clubbing, cyanosis,   Pos edema, RLE,  dsd intact   Skin: no rashes or lesions  Psych: A & O x3  Neuro: grossly intact, moves all four extremities.    Capillary Refill: Brisk,< 3 seconds   Peripheral Pulses: +2 palpable, equal bilaterally                 Labs:

## 2019-08-29 NOTE — DISCHARGE SUMMARY
evidence of calvarial trauma or acute intracranial hemorrhage,   edema, mass effect, or pathologic extra-axial fluid collection. NONCONTRAST CT CERVICAL SPINE:      NUMBER OF IMAGES \ views:  65      INDICATION:  Fall unresponsive, drug overdose      COMPARISON: None      TECHNIQUE:   Axial images were extended through the cervical spine without   intravenous contrast, and reformatted multiplanar images were provided   for review in soft tissue and bone window settings. Low-dose CT    acquisition technique included one of following options; 1 . Automated   exposure control, 2. Adjustment of MA and or KV according to patient's   size or 3. Use of iterative reconstruction. FINDINGS:   Bony structures are intact with relative preservation of alignment and   joint spacing throughout. Degenerative changes of the cervical spine   are associated with mild kyphotic curvature between the C5 and C7   levels. Individual vertebrae and facets show no evidence of acute   traumatic disruption or bony displacement. There is no paraspinous soft tissue swelling. Pulmonary apical region   show no acute findings, and at the skull base, mastoid sinuses and   middle ear cavities appear normally aerated. Orotracheal and   orogastric tubes are present. They extend beneath the lower margin of   the study, but show very prominent tortuosity in the jimmy and   hypopharynx of the orogastric tube. IMPRESSION:    No evidence of acute cervical bony or adjacent soft tissue traumatic   findings. The orogastric tube is extremely redundant in the neck region. Note: Emergency department was notified at the time of this dictation   of the redundant orogastric tube appearance by myself. CT Cervical Spine WO Contrast   Final Result   Possible extracranial soft tissue swelling over the left for head.       No evidence of calvarial trauma or acute intracranial hemorrhage,   edema, mass effect, or pathologic

## 2019-09-09 ENCOUNTER — OFFICE VISIT (OUTPATIENT)
Dept: ORTHOPEDIC SURGERY | Age: 54
End: 2019-09-09
Payer: COMMERCIAL

## 2019-09-09 VITALS
SYSTOLIC BLOOD PRESSURE: 91 MMHG | HEIGHT: 68 IN | BODY MASS INDEX: 25.76 KG/M2 | WEIGHT: 170 LBS | HEART RATE: 84 BPM | DIASTOLIC BLOOD PRESSURE: 58 MMHG

## 2019-09-09 DIAGNOSIS — T79.A21D COMPARTMENT SYNDROME OF RIGHT LOWER EXTREMITY, SUBSEQUENT ENCOUNTER: Primary | ICD-10-CM

## 2019-09-09 PROCEDURE — 99212 OFFICE O/P EST SF 10 MIN: CPT | Performed by: NURSE PRACTITIONER

## 2019-09-09 PROCEDURE — 99024 POSTOP FOLLOW-UP VISIT: CPT | Performed by: NURSE PRACTITIONER

## 2019-09-09 RX ORDER — METHADONE HYDROCHLORIDE 10 MG/ML
CONCENTRATE ORAL 2 TIMES DAILY
COMMUNITY

## 2019-09-09 NOTE — PATIENT INSTRUCTIONS
New Orders for [facility]    Continue Aspirin twice daily. Continue pain control per facility physician. Start protein drinks 2-3 times a day. Such as Ensure with protein. Referral sent for wound care. Continue daily dressing changes. Monitor for signs of infection, such as fever and chills, increased drainage, redness or warmth. Continue therapy working with range of motion and strengthening of the right lower extremity. Continue nonweightbearing to the right lower extremity. Follow-up in 2 weeks. On 9-23-19 at 11:30 AM  Call sooner with any problems or concerns.

## 2019-09-17 ENCOUNTER — HOSPITAL ENCOUNTER (OUTPATIENT)
Dept: WOUND CARE | Age: 54
Discharge: HOME | End: 2019-09-17
Payer: COMMERCIAL

## 2019-09-17 VITALS
HEART RATE: 72 BPM | SYSTOLIC BLOOD PRESSURE: 118 MMHG | TEMPERATURE: 98.4 F | BODY MASS INDEX: 25.76 KG/M2 | HEIGHT: 68 IN | RESPIRATION RATE: 18 BRPM | WEIGHT: 170 LBS | DIASTOLIC BLOOD PRESSURE: 58 MMHG

## 2019-09-17 DIAGNOSIS — T81.89XA NON-HEALING SURGICAL WOUND, INITIAL ENCOUNTER: ICD-10-CM

## 2019-09-17 PROCEDURE — 99243 OFF/OP CNSLTJ NEW/EST LOW 30: CPT | Performed by: SURGERY

## 2019-09-17 PROCEDURE — 99214 OFFICE O/P EST MOD 30 MIN: CPT

## 2019-09-17 RX ORDER — LIDOCAINE HYDROCHLORIDE 20 MG/ML
JELLY TOPICAL ONCE
Status: DISCONTINUED | OUTPATIENT
Start: 2019-09-17 | End: 2019-09-18 | Stop reason: HOSPADM

## 2019-09-17 RX ORDER — IBUPROFEN 800 MG/1
800 TABLET ORAL 2 TIMES DAILY
COMMUNITY

## 2019-09-17 RX ORDER — ACETAMINOPHEN 325 MG/1
650 TABLET ORAL EVERY 4 HOURS PRN
COMMUNITY

## 2019-09-17 RX ORDER — NALOXONE HYDROCHLORIDE 4 MG/.1ML
1 SPRAY NASAL PRN
COMMUNITY

## 2019-09-17 ASSESSMENT — PAIN DESCRIPTION - PAIN TYPE: TYPE: ACUTE PAIN

## 2019-09-17 ASSESSMENT — PAIN DESCRIPTION - PROGRESSION: CLINICAL_PROGRESSION: NOT CHANGED

## 2019-09-17 ASSESSMENT — PAIN DESCRIPTION - DESCRIPTORS: DESCRIPTORS: DULL;SHOOTING

## 2019-09-17 ASSESSMENT — PAIN DESCRIPTION - LOCATION: LOCATION: LEG

## 2019-09-17 ASSESSMENT — PAIN - FUNCTIONAL ASSESSMENT: PAIN_FUNCTIONAL_ASSESSMENT: PREVENTS OR INTERFERES SOME ACTIVE ACTIVITIES AND ADLS

## 2019-09-17 ASSESSMENT — PAIN DESCRIPTION - ORIENTATION: ORIENTATION: RIGHT;LOWER

## 2019-09-17 ASSESSMENT — PAIN SCALES - GENERAL: PAINLEVEL_OUTOF10: 8

## 2019-09-17 ASSESSMENT — PAIN DESCRIPTION - FREQUENCY: FREQUENCY: CONTINUOUS

## 2019-09-17 ASSESSMENT — PAIN DESCRIPTION - ONSET: ONSET: ON-GOING

## 2019-09-17 NOTE — PROGRESS NOTES
1:24 PM   Wound Width (cm) 4 cm 9/17/2019  1:24 PM   Wound Depth (cm) 0.4 cm 9/17/2019  1:24 PM   Wound Surface Area (cm^2) 68 cm^2 9/17/2019  1:24 PM   Wound Volume (cm^3) 27.2 cm^3 9/17/2019  1:24 PM   Wound Assessment Brown;Pink;August;Yellow 9/17/2019  1:24 PM   Drainage Amount Large 9/17/2019  1:24 PM   Drainage Description August;Yellow 9/17/2019  1:24 PM   Odor Mild 9/17/2019  1:24 PM   Nayana-wound Assessment Intact 9/17/2019  1:24 PM   Non-staged Wound Description Full thickness 9/17/2019  1:24 PM   Number of days: 0       Wound 09/17/19 Foot Anterior;Right wound #3 right anterior foot block unsure of aquaired date (Active)   Wound Image   9/17/2019  1:24 PM   Wound Traumatic 9/17/2019  1:24 PM   Wound Length (cm) 5 cm 9/17/2019  1:24 PM   Wound Width (cm) 2 cm 9/17/2019  1:24 PM   Wound Depth (cm) 0.1 cm 9/17/2019  1:24 PM   Wound Surface Area (cm^2) 10 cm^2 9/17/2019  1:24 PM   Wound Volume (cm^3) 1 cm^3 9/17/2019  1:24 PM   Wound Assessment Pink;Yellow 9/17/2019  1:24 PM   Drainage Amount Small 9/17/2019  1:24 PM   Drainage Description Serosanguinous 9/17/2019  1:24 PM   Odor None 9/17/2019  1:24 PM   Nayana-wound Assessment Dry;Fragile 9/17/2019  1:24 PM   Non-staged Wound Description Full thickness 9/17/2019  1:24 PM   Number of days: 0       Percent of Wound/Ulcer Debrided: 0%      Plan:     Pt is a smoker   - Discussed relationship of smoking and negative affects on wound healing   - Emphasized importance of tobacco avoidace/cessation      In my professional opinion and based off the information that is available at this time this patient has appropriate indication for HBO Therapy: No    Treatment Note please see attached Discharge Instructions    The patient needs to continue follow up with orthopedic surgery. It appears both wounds need further operative debridement. He has an appointment with his orthopedic surgeon next week.  If they would prefer we continue his wound care, then he can follow up with me

## 2019-09-23 ENCOUNTER — OFFICE VISIT (OUTPATIENT)
Dept: ORTHOPEDIC SURGERY | Age: 54
End: 2019-09-23
Payer: COMMERCIAL

## 2019-09-23 VITALS — HEART RATE: 74 BPM | TEMPERATURE: 97.7 F | DIASTOLIC BLOOD PRESSURE: 65 MMHG | SYSTOLIC BLOOD PRESSURE: 102 MMHG

## 2019-09-23 DIAGNOSIS — T81.89XD NON-HEALING SURGICAL WOUND, SUBSEQUENT ENCOUNTER: ICD-10-CM

## 2019-09-23 PROCEDURE — 99215 OFFICE O/P EST HI 40 MIN: CPT | Performed by: NURSE PRACTITIONER

## 2019-09-23 PROCEDURE — 99024 POSTOP FOLLOW-UP VISIT: CPT | Performed by: NURSE PRACTITIONER

## 2019-09-24 ENCOUNTER — HOSPITAL ENCOUNTER (OUTPATIENT)
Dept: WOUND CARE | Age: 54
Discharge: HOME | End: 2019-09-24
Payer: COMMERCIAL

## 2019-11-11 ENCOUNTER — TELEPHONE (OUTPATIENT)
Dept: ORTHOPEDIC SURGERY | Age: 54
End: 2019-11-11

## 2020-07-16 ENCOUNTER — HOSPITAL ENCOUNTER (EMERGENCY)
Age: 55
Discharge: LEFT BEFORE DISCHARGE | End: 2020-07-16
Attending: EMERGENCY MEDICINE
Payer: COMMERCIAL

## 2020-07-16 VITALS
HEART RATE: 110 BPM | SYSTOLIC BLOOD PRESSURE: 99 MMHG | RESPIRATION RATE: 14 BRPM | OXYGEN SATURATION: 85 % | TEMPERATURE: 98.9 F | DIASTOLIC BLOOD PRESSURE: 61 MMHG

## 2020-07-16 LAB
ACETAMINOPHEN LEVEL: <5 MCG/ML (ref 10–30)
ANION GAP SERPL CALCULATED.3IONS-SCNC: 11 MMOL/L (ref 7–16)
BASOPHILS ABSOLUTE: 0.04 E9/L (ref 0–0.2)
BASOPHILS RELATIVE PERCENT: 0.6 % (ref 0–2)
BUN BLDV-MCNC: 13 MG/DL (ref 6–20)
CALCIUM SERPL-MCNC: 9 MG/DL (ref 8.6–10.2)
CHLORIDE BLD-SCNC: 110 MMOL/L (ref 98–107)
CO2: 26 MMOL/L (ref 22–29)
CREAT SERPL-MCNC: 1.6 MG/DL (ref 0.7–1.2)
EOSINOPHILS ABSOLUTE: 0.07 E9/L (ref 0.05–0.5)
EOSINOPHILS RELATIVE PERCENT: 1.1 % (ref 0–6)
ETHANOL: <10 MG/DL (ref 0–0.08)
GFR AFRICAN AMERICAN: 55
GFR NON-AFRICAN AMERICAN: 45 ML/MIN/1.73
GLUCOSE BLD-MCNC: 125 MG/DL (ref 74–99)
HCT VFR BLD CALC: 40.8 % (ref 37–54)
HEMOGLOBIN: 13.4 G/DL (ref 12.5–16.5)
IMMATURE GRANULOCYTES #: 0.01 E9/L
IMMATURE GRANULOCYTES %: 0.2 % (ref 0–5)
LYMPHOCYTES ABSOLUTE: 1.91 E9/L (ref 1.5–4)
LYMPHOCYTES RELATIVE PERCENT: 30.4 % (ref 20–42)
MCH RBC QN AUTO: 30.5 PG (ref 26–35)
MCHC RBC AUTO-ENTMCNC: 32.8 % (ref 32–34.5)
MCV RBC AUTO: 92.9 FL (ref 80–99.9)
MONOCYTES ABSOLUTE: 0.48 E9/L (ref 0.1–0.95)
MONOCYTES RELATIVE PERCENT: 7.6 % (ref 2–12)
NEUTROPHILS ABSOLUTE: 3.77 E9/L (ref 1.8–7.3)
NEUTROPHILS RELATIVE PERCENT: 60.1 % (ref 43–80)
PDW BLD-RTO: 13.5 FL (ref 11.5–15)
PLATELET # BLD: 320 E9/L (ref 130–450)
PMV BLD AUTO: 10 FL (ref 7–12)
POTASSIUM REFLEX MAGNESIUM: 4.5 MMOL/L (ref 3.5–5)
RBC # BLD: 4.39 E12/L (ref 3.8–5.8)
SALICYLATE, SERUM: <0.3 MG/DL (ref 0–30)
SODIUM BLD-SCNC: 147 MMOL/L (ref 132–146)
TRICYCLIC ANTIDEPRESSANTS SCREEN SERUM: NEGATIVE NG/ML
WBC # BLD: 6.3 E9/L (ref 4.5–11.5)

## 2020-07-16 PROCEDURE — 80048 BASIC METABOLIC PNL TOTAL CA: CPT

## 2020-07-16 PROCEDURE — 85025 COMPLETE CBC W/AUTO DIFF WBC: CPT

## 2020-07-16 PROCEDURE — 6360000002 HC RX W HCPCS: Performed by: STUDENT IN AN ORGANIZED HEALTH CARE EDUCATION/TRAINING PROGRAM

## 2020-07-16 PROCEDURE — G0480 DRUG TEST DEF 1-7 CLASSES: HCPCS

## 2020-07-16 PROCEDURE — 80307 DRUG TEST PRSMV CHEM ANLYZR: CPT

## 2020-07-16 PROCEDURE — 36415 COLL VENOUS BLD VENIPUNCTURE: CPT

## 2020-07-16 PROCEDURE — 99285 EMERGENCY DEPT VISIT HI MDM: CPT

## 2020-07-16 RX ORDER — HYDROXYZINE PAMOATE 50 MG/1
50 CAPSULE ORAL 3 TIMES DAILY PRN
COMMUNITY

## 2020-07-16 RX ORDER — NALOXONE HYDROCHLORIDE 1 MG/ML
2 INJECTION INTRAMUSCULAR; INTRAVENOUS; SUBCUTANEOUS ONCE
Status: DISCONTINUED | OUTPATIENT
Start: 2020-07-16 | End: 2020-07-16 | Stop reason: HOSPADM

## 2020-07-16 ASSESSMENT — ENCOUNTER SYMPTOMS
BACK PAIN: 0
EYE DISCHARGE: 0
COUGH: 0
NAUSEA: 0
ABDOMINAL PAIN: 0
EYE REDNESS: 0
SINUS PRESSURE: 0
SORE THROAT: 0
WHEEZING: 0
VOMITING: 0
SHORTNESS OF BREATH: 0
DIARRHEA: 0
EYE PAIN: 0

## 2020-07-16 NOTE — ED NOTES
Pt's son accompanied him into the ED, the pt advised his son and me that he did use \"5 dollars of dope to get some sleep\", because he hasnt slept for days. He denies wanting to harm himself.       Vinicius Llamas RN  07/16/20 2934

## 2020-07-16 NOTE — ED PROVIDER NOTES
Chief Complaint   Patient presents with    Drug Overdose     brought in by family member-found outside in yard by neighbor hx heroin use       Veronica Diaz is a 80-year-old male with a past medical history of substance abuse, who is brought in by his son for concerns of drug overdose. Son states that his neighbors found him minimally responsive, and they called him. Patient states he arrived at home, and brought the patient to the hospital himself. Son states that the patient has a very long any history of drug overdose. Son states that he uses heroin almost every day. He also states that he went and saw psychiatrist yesterday, who started him on 3 new medications. He is concerned that his father may have taken extra doses of her psychiatric medication. The history is provided by the patient. No  was used. Review of Systems   Constitutional: Negative for chills and fever. HENT: Negative for ear pain, sinus pressure and sore throat. Eyes: Negative for pain, discharge and redness. Respiratory: Negative for cough, shortness of breath and wheezing. Cardiovascular: Negative for chest pain. Gastrointestinal: Negative for abdominal pain, diarrhea, nausea and vomiting. Genitourinary: Negative for dysuria and frequency. Musculoskeletal: Negative for arthralgias and back pain. Skin: Negative for rash and wound. Neurological: Negative for weakness and headaches. Hematological: Negative for adenopathy. Psychiatric/Behavioral: Positive for agitation. Negative for behavioral problems, confusion and suicidal ideas. All other systems reviewed and are negative. Physical Exam  Vitals signs and nursing note reviewed. Constitutional:       General: He is not in acute distress. Appearance: Normal appearance. He is well-developed and normal weight. He is not ill-appearing.       Comments: Patient was initially somnolent, was difficult to arouse, however prior to medication 66-year-old male presents today after a unintentional drug overdose. Patient arrived to the department, he was. Patient was protecting his own airway. A line was placed, and prior to Narcan being given patient woke up. He was alert and oriented x3, is able to answer all questions appropriately. He was in no acute distress. I spoke with the patient about staying in the hospital and allowing us to obtain lab work and imaging. Patient adamantly refused. He states he would did not want to stay in the hospital, he did not want lab work or imaging. He does not want to stay for observation. Patient is adamant that he wants to leave [address] I had a long conversation with the patient about the adverse effects of heroin, which can cause respiratory suppression, and with his condition this could be a life-threatening event if he were to go home, become somnolent, and stopped breathing. Patient verbally understands the risks, and is adamant that he still wants to leave. Patient is alert and oriented x3, he has the capacity to make decisions. Patient will sign out AMA. I spoke with the patient he is always welcome to come back to the ER for evaluation if he will need to. ED Course as of Jul 16 1629   Thu Jul 16, 2020   1542 Patient's nurse is informed that patient is now awake, he was not given Narcan. Patient is alert and oriented x3, he has capacity to make decisions. I spoke with the patient that we would like him to stay in the hospital, to get basic lab work and keep an eye on him, since he could become more somnolent and unresponsive due to the heroin that he took today, for which he admits to taking. Patient is adamant that he wants to sign out [address] He states he feels fine, and does not want to stay in the hospital for evaluation. Patient has capacity to make decisions, and will have to sign out AMA.     [JH]      ED Course User Index  [JH] Shanelle Mathis,  Granulocytes # 0.01 E9/L    Lymphocytes Absolute 1.91 1.50 - 4.00 E9/L    Monocytes Absolute 0.48 0.10 - 0.95 E9/L    Eosinophils Absolute 0.07 0.05 - 0.50 E9/L    Basophils Absolute 0.04 0.00 - 0.20 E9/L       Radiology:  No orders to display       ------------------------- NURSING NOTES AND VITALS REVIEWED ---------------------------  Date / Time Roomed:  7/16/2020  3:09 PM  ED Bed Assignment:  [id number]    The nursing notes within the ED encounter and vital signs as below have been reviewed. BP 99/61   Pulse 110   Temp 98.9 °F (37.2 °C) (Oral)   Resp 14   SpO2 (!) 85%   Oxygen Saturation Interpretation: Normal      ------------------------------------------ PROGRESS NOTES ------------------------------------------  I have spoken with the patient and discussed todays results, in addition to providing specific details for the plan of care and counseling regarding the diagnosis and prognosis. Discharge Medication List as of 7/16/2020  3:58 PM          Diagnosis:  1. Accidental drug overdose, initial encounter      Followed up with Fadi Lang on 7/16/2020 at 4:29 PM. Patient left the ED with a disposition of AMA on 7/16/2020. Patient cited improved/resolved symptoms as reason. Advised patient to follow up with a primary care physician or return to the Emergency Department if symptoms worsen.    [id number],   Resident  07/16/20 6257

## 2020-07-16 NOTE — ED NOTES
- Resolved  - Urine culture -ve  - Flomax started 1/10. Will try to D/C [place] once off aggressive diuresis       Pt's son came home and the neighbors found him outside, he possible overdosed. Has a history of heroin overdose, pt may have taken too many Rx medications too.       Dianne Olivares RN  07/16/20 9300

## 2020-07-16 NOTE — ED NOTES
Pt is acting aggressive now, refusing to allow narcan to be given. He is arguing with his son stating he wants to call his  and sign out.  notified.       Darlene Lal RN  07/16/20 9485 [address]inia Adair RN  07/16/20 7049

## 2020-07-16 NOTE — ED NOTES
Room air SpO2 was 85 % , pt was placed onto NC at 6 lpm, SpO2 increased to 94%.      Terrance Blas RN  07/16/20 9170

## 2020-07-16 NOTE — ED NOTES
Pt was placed onto SpO2 when he arrived. He is refusing any further treatment at this time, David La is talking with the pt.       Karyn Pete RN  07/16/20 7940

## 2024-01-22 NOTE — PROGRESS NOTES
Department of Orthopedic Surgery   Progress Note    Patient seen and examined. Orthopedics messaged by nursing for increased pain and tightness in leg. He complains of some numbness and tingling as if leg is \"asleep. \"  Pt is worried his dressings are too tight    VITALS:  /72   Pulse 102   Temp 97.5 °F (36.4 °C) (Temporal)   Resp 20   Ht 5' 7\" (1.702 m)   Wt 182 lb 12.8 oz (82.9 kg)   SpO2 93%   BMI 28.63 kg/m²     GENERAL: AAOx3  MUSCULOSKELETAL:   right lower extremity:  · Wound vac C/D/I, holding suction; dressing CDI  · Compartments firm but compressible. Thigh is soft and compressible  · Palpable dorsalis pedis and posterior tibialis pulse, brisk cap refill to toes, foot warm and perfused  · Motor and sensation not intact at this time to leg/foot again noted    CBC:   Lab Results   Component Value Date    WBC 8.1 08/16/2019    HGB 8.9 08/16/2019    HCT 27.1 08/16/2019     08/16/2019       ASSESSMENT  · S/P R lower leg fasciotomy 8/11, repeat I&D 8/14    PLAN    · Pain is likely related to post op pain, dressing changes, and possible early signs of nerve recovery. Will continue to monitor but no new changes in current management at this time. · Ace wrap loosened and wrapped loosely.   Continue to elevate on pillows  · NWB RLE  · Pain control pr primary team  · DVT prophylaxis- per primary team  · Will need repeat I&D and secondary closure in the next 3-5 days   · PT/OT  · D/C planning, SW/PT recs  · Continue to follow   · Electronically signed by Angelica Gowers, DO on 8/16/2019 at 11:24 PM Detail Level: Detailed Acne Type: Comedonal Lesions Prep Text (Optional): Prior to removal the treatment areas were prepped in the usual fashion. Consent was obtained and risks were reviewed including but not limited to scarring, infection, bleeding, scabbing, incomplete removal, and allergy to anesthesia. Extraction Method: 11 blade and q-tip Render Post-Care Instructions In Note?: no Post-Care Instructions: I reviewed with the patient in detail post-care instructions. Patient is to keep the treatment areas dry overnight, and then apply bacitracin twice daily until healed. Patient may apply hydrogen peroxide soaks to remove any crusting.

## (undated) DEVICE — GLOVE ORANGE PI 8   MSG9080

## (undated) DEVICE — Z DISCONTINUED USE 2275686 GLOVE SURG SZ 8 L12IN FNGR THK13MIL WHT ISOLEX POLYISOPRENE

## (undated) DEVICE — DRESSING NEG PRSS L W15XH3.3XL26CM BLK POLYUR DRP PD

## (undated) DEVICE — 1.5 TO 1 DERMACARRIER: Brand: MESHGRAFTTM  II TISSUE EXPANSION SYSTEM

## (undated) DEVICE — AGENT HEMSTAT 5GM ARISTA AH

## (undated) DEVICE — TOTAL KNEE PK

## (undated) DEVICE — KIT DSG LRG NEG PRSS WND THER VAC GRANUFOAM

## (undated) DEVICE — CANISTER NEG PRSS 1000ML W/ GEL INFOVAC

## (undated) DEVICE — BANDAGE COMPR W6INXL12FT SMOOTH FOR LIMB EXSANG ESMARCH

## (undated) DEVICE — SET ORTHO STD STORTSTD1

## (undated) DEVICE — PAD,NON-ADHERENT,3X8,STERILE,LF,1/PK: Brand: MEDLINE

## (undated) DEVICE — SET IRR L100IN DIA0.280IN C100ML 2 NVENT PIERCING PINS 3

## (undated) DEVICE — CONNECTOR NEG PRSS WHT PLAS Y DISP

## (undated) DEVICE — INTENDED FOR TISSUE SEPARATION, AND OTHER PROCEDURES THAT REQUIRE A SHARP SURGICAL BLADE TO PUNCTURE OR CUT.: Brand: BARD-PARKER ® STAINLESS STEEL BLADES

## (undated) DEVICE — PADDING,UNDERCAST,COTTON, 4"X4YD STERILE: Brand: MEDLINE

## (undated) DEVICE — Z INACTIVE USE 2660664 SOLUTION IRRIG 3000ML 0.9% SOD CHL USP UROMATIC PLAS CONT

## (undated) DEVICE — STANDARD HYPODERMIC NEEDLE,POLYPROPYLENE HUB: Brand: MONOJECT

## (undated) DEVICE — SOLUTION SURG PREP ANTIMICROBIAL 4 OZ SKIN WND EXIDINE

## (undated) DEVICE — GLOVE SURG SZ 8 L12IN FNGR THK79MIL GRN LTX FREE

## (undated) DEVICE — DRIP REDUCTION MANIFOLD

## (undated) DEVICE — BASIC SINGLE BASIN 1-LF: Brand: MEDLINE INDUSTRIES, INC.

## (undated) DEVICE — SURGICAL PROCEDURE PACK BASIC

## (undated) DEVICE — PAD DSG NEG PRSS W/ TBNG CLMP CONN SENSATRAC VAC

## (undated) DEVICE — LOOP VES W25MM THK1MM MAXI RED SIL FLD REPELLENT 100 PER

## (undated) DEVICE — GOWN,BREATHABLE SLV,AURORA,XLG,STRL: Brand: MEDLINE

## (undated) DEVICE — READY WET SKIN SCRUB TRAY-LF: Brand: MEDLINE INDUSTRIES, INC.

## (undated) DEVICE — CANISTER VAC 500ML TBNG RESVR FOR INFOVAC W O GEL CLMP CONN

## (undated) DEVICE — GAUZE,SPONGE,AVANT,4"X4",4PLY,STRL,10/TR: Brand: MEDLINE

## (undated) DEVICE — SET ORTHO STD STORTSTD2

## (undated) DEVICE — CHLORAPREP 26ML ORANGE

## (undated) DEVICE — Device

## (undated) DEVICE — BANDAGE COMPR W4INXL10YD WHITE/BEIGE E MTRX HK LOOP CLSR

## (undated) DEVICE — TUBING SUCT 12FR MAL ALUM SHFT FN CAP VENT UNIV CONN W/ OBT

## (undated) DEVICE — HANDPIECE SET WITH BONE CLEANING TIP AND SUCTION TUBE: Brand: INTERPULSE

## (undated) DEVICE — DERMATOME BLADES: Brand: DERMATOME

## (undated) DEVICE — 3M™ IOBAN™ 2 ANTIMICROBIAL INCISE DRAPE 6640EZ: Brand: IOBAN™ 2

## (undated) DEVICE — DRESSING PETRO W3XL8IN OIL EMUL N ADH GZ KNIT IMPREG CELOS

## (undated) DEVICE — PADDING CAST W6INXL4YD COT LO LINTING WYTEX

## (undated) DEVICE — DRAPE NEG PRSS W30.5XL26CM POLYUR ADH VAC

## (undated) DEVICE — CLOTH SURG PREP PREOPERATIVE CHLORHEXIDINE GLUC 2% READYPREP

## (undated) DEVICE — BLADE CLIPPER GEN PURP NS

## (undated) DEVICE — ZIMMER® STERILE DISPOSABLE TOURNIQUET CUFF WITH PROTECTIVE SLEEVE AND PLC, DUAL PORT, SINGLE BLADDER, 34 IN. (86 CM)

## (undated) DEVICE — PATIENT RETURN ELECTRODE, SINGLE-USE, CONTACT QUALITY MONITORING, ADULT, WITH 9FT CORD, FOR PATIENTS WEIGING OVER 33LBS. (15KG): Brand: MEGADYNE

## (undated) DEVICE — TOWEL,OR,DSP,ST,BLUE,DLX,10/PK,8PK/CS: Brand: MEDLINE

## (undated) DEVICE — SPONGE LAP W18XL18IN WHT COT 4 PLY FLD STRUNG RADPQ DISP ST

## (undated) DEVICE — GOWN,AURORA,BRTHSLV,2XL,18/CS: Brand: MEDLINE